# Patient Record
Sex: FEMALE | Race: WHITE | NOT HISPANIC OR LATINO | Employment: OTHER | URBAN - METROPOLITAN AREA
[De-identification: names, ages, dates, MRNs, and addresses within clinical notes are randomized per-mention and may not be internally consistent; named-entity substitution may affect disease eponyms.]

---

## 2021-07-26 ENCOUNTER — TELEPHONE (OUTPATIENT)
Dept: SCHEDULING | Facility: IMAGING CENTER | Age: 81
End: 2021-07-26

## 2021-08-23 ENCOUNTER — OFFICE VISIT (OUTPATIENT)
Dept: MEDICAL GROUP | Facility: MEDICAL CENTER | Age: 81
End: 2021-08-23
Payer: MEDICARE

## 2021-08-23 VITALS
HEIGHT: 67 IN | TEMPERATURE: 97.1 F | SYSTOLIC BLOOD PRESSURE: 112 MMHG | BODY MASS INDEX: 43.16 KG/M2 | OXYGEN SATURATION: 94 % | DIASTOLIC BLOOD PRESSURE: 64 MMHG | WEIGHT: 275 LBS | HEART RATE: 71 BPM | RESPIRATION RATE: 18 BRPM

## 2021-08-23 DIAGNOSIS — H35.30 MACULAR DEGENERATION OF BOTH EYES, UNSPECIFIED TYPE: ICD-10-CM

## 2021-08-23 DIAGNOSIS — Z12.83 SKIN CANCER SCREENING: ICD-10-CM

## 2021-08-23 DIAGNOSIS — C18.1 CANCER OF APPENDIX (HCC): ICD-10-CM

## 2021-08-23 DIAGNOSIS — Z79.891 CHRONIC USE OF OPIATE FOR THERAPEUTIC PURPOSE: ICD-10-CM

## 2021-08-23 DIAGNOSIS — F32.A MILD DEPRESSION: ICD-10-CM

## 2021-08-23 DIAGNOSIS — I10 BENIGN HYPERTENSION: ICD-10-CM

## 2021-08-23 DIAGNOSIS — M75.111 INCOMPLETE TEAR OF RIGHT ROTATOR CUFF, UNSPECIFIED WHETHER TRAUMATIC: ICD-10-CM

## 2021-08-23 DIAGNOSIS — M25.562 BILATERAL CHRONIC KNEE PAIN: ICD-10-CM

## 2021-08-23 DIAGNOSIS — E78.2 MIXED HYPERLIPIDEMIA: ICD-10-CM

## 2021-08-23 DIAGNOSIS — R26.2 UNABLE TO WALK: ICD-10-CM

## 2021-08-23 DIAGNOSIS — E66.01 MORBID OBESITY (HCC): ICD-10-CM

## 2021-08-23 DIAGNOSIS — I10 ESSENTIAL HYPERTENSION: ICD-10-CM

## 2021-08-23 DIAGNOSIS — C55: ICD-10-CM

## 2021-08-23 DIAGNOSIS — G89.29 BILATERAL CHRONIC KNEE PAIN: ICD-10-CM

## 2021-08-23 DIAGNOSIS — E61.1 IRON DEFICIENCY: ICD-10-CM

## 2021-08-23 DIAGNOSIS — M25.561 BILATERAL CHRONIC KNEE PAIN: ICD-10-CM

## 2021-08-23 PROBLEM — D50.9 MICROCYTIC ANEMIA: Status: ACTIVE | Noted: 2017-01-17

## 2021-08-23 PROBLEM — Z90.49 HISTORY OF PARTIAL SURGICAL REMOVAL OF COLON: Status: ACTIVE | Noted: 2019-03-17

## 2021-08-23 PROBLEM — E78.5 HYPERLIPIDEMIA: Status: ACTIVE | Noted: 2017-01-17

## 2021-08-23 PROBLEM — M25.519 SHOULDER PAIN: Status: ACTIVE | Noted: 2017-08-12

## 2021-08-23 PROBLEM — D3A.020 CARCINOID TUMOR OF APPENDIX: Status: ACTIVE | Noted: 2019-02-05

## 2021-08-23 PROBLEM — R53.1 ASTHENIA DUE TO DISEASE: Status: ACTIVE | Noted: 2019-01-22

## 2021-08-23 PROCEDURE — 99204 OFFICE O/P NEW MOD 45 MIN: CPT | Performed by: INTERNAL MEDICINE

## 2021-08-23 RX ORDER — HYDROCODONE BITARTRATE AND ACETAMINOPHEN 7.5; 325 MG/1; MG/1
TABLET ORAL
COMMUNITY
End: 2021-08-23 | Stop reason: SDUPTHER

## 2021-08-23 RX ORDER — FERROUS SULFATE 325(65) MG
325 TABLET ORAL 2 TIMES DAILY
Qty: 180 TABLET | Refills: 0 | Status: SHIPPED | OUTPATIENT
Start: 2021-08-23 | End: 2021-10-05 | Stop reason: SDUPTHER

## 2021-08-23 RX ORDER — CITALOPRAM 20 MG/1
20 TABLET ORAL DAILY
Qty: 90 TABLET | Refills: 3 | Status: SHIPPED | OUTPATIENT
Start: 2021-08-23 | End: 2022-08-25 | Stop reason: SDUPTHER

## 2021-08-23 RX ORDER — PRAVASTATIN SODIUM 20 MG
20 TABLET ORAL
Qty: 90 TABLET | Refills: 3 | Status: SHIPPED | OUTPATIENT
Start: 2021-08-23 | End: 2022-08-25 | Stop reason: SDUPTHER

## 2021-08-23 RX ORDER — PRAVASTATIN SODIUM 20 MG
20 TABLET ORAL NIGHTLY
COMMUNITY
End: 2021-08-23

## 2021-08-23 RX ORDER — HYDROCODONE BITARTRATE AND ACETAMINOPHEN 7.5; 325 MG/1; MG/1
1 TABLET ORAL EVERY 8 HOURS PRN
Qty: 60 TABLET | Refills: 0 | Status: SHIPPED | OUTPATIENT
Start: 2021-08-23 | End: 2021-09-28 | Stop reason: SDUPTHER

## 2021-08-23 RX ORDER — OMEGA-3S/DHA/EPA/FISH OIL/D3 300MG-1000
10 CAPSULE ORAL DAILY
COMMUNITY
End: 2023-01-01

## 2021-08-23 RX ORDER — LOSARTAN POTASSIUM 50 MG/1
75 TABLET ORAL
COMMUNITY
Start: 2021-07-16 | End: 2021-08-23

## 2021-08-23 RX ORDER — FERROUS SULFATE 325(65) MG
2 TABLET ORAL 2 TIMES DAILY
COMMUNITY
End: 2021-08-23 | Stop reason: SDUPTHER

## 2021-08-23 RX ORDER — POLYETHYLENE GLYCOL 3350 17 G/17G
17 POWDER, FOR SOLUTION ORAL DAILY
COMMUNITY
End: 2022-08-25

## 2021-08-23 RX ORDER — CITALOPRAM HYDROBROMIDE 10 MG/1
10 TABLET ORAL DAILY
COMMUNITY
End: 2021-08-23 | Stop reason: SDUPTHER

## 2021-08-23 RX ORDER — LOSARTAN POTASSIUM 50 MG/1
75 TABLET ORAL DAILY
Qty: 135 TABLET | Refills: 3 | Status: SHIPPED | OUTPATIENT
Start: 2021-08-23 | End: 2021-11-21

## 2021-08-23 ASSESSMENT — PATIENT HEALTH QUESTIONNAIRE - PHQ9: CLINICAL INTERPRETATION OF PHQ2 SCORE: 0

## 2021-08-23 NOTE — PROGRESS NOTES
New Patient    Olive Alexandra is a 81 y.o. 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 She is s/p 5040 cGy  IMRT with Dr Crews as of June 2019. used to see Ash Kuo MD    Morbid obesity (HCC)  Body mass index is 43.07 kg/m².    Healthful lifestyle measures        Carcinoma of uterus (HCC)  h/o Stage IIIB Endometrial CA s/p Xi RA hysterectomy, HARIS, RP mass excision, appendectomy, cytoreduction, enterolysis, omental resection on 1/18/2019 for grade II endometrial adenocarcinoma with final pathology revealing 100% invasion, +LVSI, no LN involvement. 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 She is s/p 5040 cGy  IMRT with Dr Crews as of June 2019. used to see Ash Kuo MD    Unable to walk  Unable to walk since 3/13/2019  On a scooter      Partial tear of right rotator cuff  Partial tear of right  "rotator cuff 3/13/19        Current Outpatient Medications   Medication Sig Dispense Refill   • HYDROcodone-acetaminophen (NORCO) 7.5-325 MG per tablet Take 1 Tablet by mouth every 8 hours as needed for Severe Pain for up to 30 days. 60 Tablet 0   • citalopram (CELEXA) 20 MG Tab Take 1 Tablet by mouth every day. 90 Tablet 3   • ferrous sulfate 325 (65 Fe) MG tablet Take 1 Tablet by mouth 2 times a day. 180 Tablet 0   • pravastatin (PRAVACHOL) 20 MG Tab Take 1 Tablet by mouth at bedtime. 90 Tablet 3   • losartan (COZAAR) 50 MG Tab Take 1.5 Tablets by mouth every day for 90 days. 135 Tablet 3   • ascorbic acid (VITAMIN C) 100 MG tablet      • vitamin D3, cholecalciferol, 400 UNIT Tab tablet Take 10 mcg by mouth every day.     • polyethylene glycol 3350 (MIRALAX) 17 GM/SCOOP Powder Take 17 g by mouth every day.     • B Complex-Folic Acid (B COMPLEX-VITAMIN B12 PO) Take 1 Tablet by mouth.       No current facility-administered medications for this visit.       Allergies, past medical history, past surgical history, medications, family history, social history reviewed and updated.    ROS   Constitutional: Denies fevers or chills  Eyes: Denies changes in vision  Ears/Nose/Throat/Mouth: Denies nasal congestion or sore throat   Cardiovascular: Denies chest pain or palpitations   Respiratory: Denies shortness of breath , Denies cough  Gastrointestinal/Hepatic: Denies abd pain, nausea, vomiting   Genitourinary: Denies dysuria or frequency  Musculoskeletal/Rheum: chronic knee pain, intermittent bilateral shoulder pain   Neurological: Denies headache  Psychiatric: mood stable  Endocrine: Denies hx of diabetes or thyroid dysfunction  Heme/Oncology/Lymph Nodes: Denies weight changes or enlarged LNs.    Physical Exam  Vitals: /64 (BP Location: Left arm, Patient Position: Sitting, BP Cuff Size: Adult)   Pulse 71   Temp 36.2 °C (97.1 °F) (Temporal)   Resp 18   Ht 1.702 m (5' 7\") Comment: verbal (unable to stand to take " height)  Wt 125 kg (275 lb) Comment: verbal (pt unable to stand to take weight ght)  SpO2 94%   BMI 43.07 kg/m²   General: Alert, pleasant, NAD  HEENT: Normocephalic.  EOMI, no icterus or pallor.  Conjunctivae and lids normal. External ears normal. Wearing a mask. Oropharynx non-erythematous, mucous membranes moist.  Neck supple.  No thyromegaly or masses palpated.   Lymph: No cervical or supraclavicular lymphadenopathy.  Cardiovascular: Regular rate and rhythm.   Respiratory: Normal respiratory effort.  Clear to auscultation bilaterally.  Abdomen: Non-distended, soft, non-tender  Skin: Warm, dry  Musculoskeletal: on a scooter, not able to stand.   Extremities: left lateral leg swelling.  Psych:  Affect/mood is normal, judgement is good, memory is intact, grooming is appropriate.    Assessment and Plan    1. Carcinoma of uterus (HCC)  2. Cancer of appendix (HCC)  h/o Stage IIIB Endometrial CA s/p Xi RA hysterectomy, HARIS, RP mass excision, appendectomy, cytoreduction, enterolysis, omental resection on 1/18/2019 for grade II endometrial adenocarcinoma with final pathology revealing 100% invasion, +LVSI, no LN involvement. 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 She is s/p 5040 cGy  IMRT with Dr Crews as of June 2019. used to see Ash Kuo MD       - REFERRAL TO GYNECOLOGIC ONCOLOGY      3. Macular degeneration of both eyes, unspecified type  - REFERRAL TO OPHTHALMOLOGY  On PreserVision    4. Bilateral chronic knee pain  5. Chronic use of opiate for therapeutic purpose  - REFERRAL TO PAIN MANAGEMENT  - DX-KNEE COMPLETE 4+ LEFT; Future  - HYDROcodone-acetaminophen (NORCO) 7.5-325 MG per tablet; Take 1 Tablet by mouth every 8 hours as needed for Severe Pain for up to 30 days.  Dispense: 60 Tablet; Refill: 0  Bowel regimen as needed (she has BM every morning, no  problem)  - Controlled Substance Treatment Agreement  Unable to provide urine today due to not being able to stand, she will bring her board from home, so she will try to collect urine in the clinic next visit for UDS.   A Controlled Substance Agreement has been signed within the last year.     The patient reports that pain is well controlled with the current treatment plan  They were counseled on other means to help with pain  This patient is continuing to use a controlled substance (CS) on a long term basis.  The patient is thoroughly aware of the goals of treatment with the CS  The patient is aware that yearly and random urine drug screens are required.  The patient has been instructed to take the CS only as prescribed.  The patient is prohibited from sharing the CS with any other person.  The patient is instructed to inform the provider if any other CS is taken, of any alcohol or cannabis or other recreational drug use, any treatment for side effects of the CS or complications, if they have CS active rx in other states  The patient has evidence for a reason for the CS  The treatment plan has been discussed with the patient  The  report has been reviewed    6. Skin cancer screening  - REFERRAL TO DERMATOLOGY    7. Essential hypertension  - losartan (COZAAR) 50 MG Tab; Take 1.5 Tablets by mouth every day for 90 days.  Dispense: 135 Tablet; Refill: 3  - CBC WITH DIFFERENTIAL; Future  - Comp Metabolic Panel; Future    8. Mixed hyperlipidemia  - pravastatin (PRAVACHOL) 20 MG Tab; Take 1 Tablet by mouth at bedtime.  Dispense: 90 Tablet; Refill: 3  - Lipid Profile; Future  - TSH WITH REFLEX TO FT4; Future    9. Iron deficiency  - ferrous sulfate 325 (65 Fe) MG tablet; Take 1 Tablet by mouth 2 times a day.  Dispense: 180 Tablet; Refill: 0  - CBC WITH DIFFERENTIAL; Future  - IRON/TOTAL IRON BIND; Future  - FERRITIN; Future  No overt bleeding  Had EGD colonoscopy in 2019    10. Morbid obesity (HCC)  Healthful lifestyle  measures    11. Mild depression (HCC)  - citalopram (CELEXA) 20 MG Tab; Take 1 Tablet by mouth every day.  Dispense: 90 Tablet; Refill: 3      Follow-up:Return in about 1 month (around 9/23/2021), or if symptoms worsen or fail to improve.    This note was created using voice recognition software. There may be unintended errors in spelling, grammar or content.

## 2021-08-23 NOTE — ASSESSMENT & PLAN NOTE
On ferrous sulfate 325 mg BID  Had EGD colonoscopy in 2019, will need to request records       Statement Selected

## 2021-08-23 NOTE — ASSESSMENT & PLAN NOTE
h/o Stage IIIB Endometrial CA s/p Xi RA hysterectomy, HARIS, RP mass excision, appendectomy, cytoreduction, enterolysis, omental resection on 1/18/2019 for grade II endometrial adenocarcinoma with final pathology revealing 100% invasion, +LVSI, no LN involvement. 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 She is s/p 5040 cGy  IMRT with Dr Crews as of June 2019. used to see Ash Kuo MD

## 2021-09-09 ENCOUNTER — HOSPITAL ENCOUNTER (OUTPATIENT)
Dept: LAB | Facility: MEDICAL CENTER | Age: 81
End: 2021-09-09
Attending: INTERNAL MEDICINE
Payer: MEDICARE

## 2021-09-09 DIAGNOSIS — E78.2 MIXED HYPERLIPIDEMIA: ICD-10-CM

## 2021-09-09 DIAGNOSIS — I10 BENIGN HYPERTENSION: ICD-10-CM

## 2021-09-09 DIAGNOSIS — E61.1 IRON DEFICIENCY: ICD-10-CM

## 2021-09-09 LAB
ALBUMIN SERPL BCP-MCNC: 3.9 G/DL (ref 3.2–4.9)
ALBUMIN/GLOB SERPL: 1.2 G/DL
ALP SERPL-CCNC: 162 U/L (ref 30–99)
ALT SERPL-CCNC: 24 U/L (ref 2–50)
ANION GAP SERPL CALC-SCNC: 11 MMOL/L (ref 7–16)
AST SERPL-CCNC: 21 U/L (ref 12–45)
BASOPHILS # BLD AUTO: 1 % (ref 0–1.8)
BASOPHILS # BLD: 0.06 K/UL (ref 0–0.12)
BILIRUB SERPL-MCNC: 0.5 MG/DL (ref 0.1–1.5)
BUN SERPL-MCNC: 19 MG/DL (ref 8–22)
CALCIUM SERPL-MCNC: 10 MG/DL (ref 8.5–10.5)
CHLORIDE SERPL-SCNC: 102 MMOL/L (ref 96–112)
CHOLEST SERPL-MCNC: 146 MG/DL (ref 100–199)
CO2 SERPL-SCNC: 27 MMOL/L (ref 20–33)
CREAT SERPL-MCNC: 0.48 MG/DL (ref 0.5–1.4)
EOSINOPHIL # BLD AUTO: 0.37 K/UL (ref 0–0.51)
EOSINOPHIL NFR BLD: 6.3 % (ref 0–6.9)
ERYTHROCYTE [DISTWIDTH] IN BLOOD BY AUTOMATED COUNT: 53.1 FL (ref 35.9–50)
FASTING STATUS PATIENT QL REPORTED: NORMAL
FERRITIN SERPL-MCNC: 106 NG/ML (ref 10–291)
GLOBULIN SER CALC-MCNC: 3.2 G/DL (ref 1.9–3.5)
GLUCOSE SERPL-MCNC: 104 MG/DL (ref 65–99)
HCT VFR BLD AUTO: 44.9 % (ref 37–47)
HDLC SERPL-MCNC: 51 MG/DL
HGB BLD-MCNC: 14.1 G/DL (ref 12–16)
IMM GRANULOCYTES # BLD AUTO: 0.03 K/UL (ref 0–0.11)
IMM GRANULOCYTES NFR BLD AUTO: 0.5 % (ref 0–0.9)
IRON SATN MFR SERPL: 22 % (ref 15–55)
IRON SERPL-MCNC: 61 UG/DL (ref 40–170)
LDLC SERPL CALC-MCNC: 77 MG/DL
LYMPHOCYTES # BLD AUTO: 0.74 K/UL (ref 1–4.8)
LYMPHOCYTES NFR BLD: 12.6 % (ref 22–41)
MCH RBC QN AUTO: 30.1 PG (ref 27–33)
MCHC RBC AUTO-ENTMCNC: 31.4 G/DL (ref 33.6–35)
MCV RBC AUTO: 95.9 FL (ref 81.4–97.8)
MONOCYTES # BLD AUTO: 0.57 K/UL (ref 0–0.85)
MONOCYTES NFR BLD AUTO: 9.7 % (ref 0–13.4)
NEUTROPHILS # BLD AUTO: 4.11 K/UL (ref 2–7.15)
NEUTROPHILS NFR BLD: 69.9 % (ref 44–72)
NRBC # BLD AUTO: 0 K/UL
NRBC BLD-RTO: 0 /100 WBC
PLATELET # BLD AUTO: 233 K/UL (ref 164–446)
PMV BLD AUTO: 11.1 FL (ref 9–12.9)
POTASSIUM SERPL-SCNC: 4.9 MMOL/L (ref 3.6–5.5)
PROT SERPL-MCNC: 7.1 G/DL (ref 6–8.2)
RBC # BLD AUTO: 4.68 M/UL (ref 4.2–5.4)
SODIUM SERPL-SCNC: 140 MMOL/L (ref 135–145)
TIBC SERPL-MCNC: 276 UG/DL (ref 250–450)
TRIGL SERPL-MCNC: 92 MG/DL (ref 0–149)
TSH SERPL DL<=0.005 MIU/L-ACNC: 0.8 UIU/ML (ref 0.38–5.33)
UIBC SERPL-MCNC: 215 UG/DL (ref 110–370)
WBC # BLD AUTO: 5.9 K/UL (ref 4.8–10.8)

## 2021-09-09 PROCEDURE — 80061 LIPID PANEL: CPT

## 2021-09-09 PROCEDURE — 36415 COLL VENOUS BLD VENIPUNCTURE: CPT

## 2021-09-09 PROCEDURE — 83540 ASSAY OF IRON: CPT

## 2021-09-09 PROCEDURE — 85025 COMPLETE CBC W/AUTO DIFF WBC: CPT

## 2021-09-09 PROCEDURE — 82728 ASSAY OF FERRITIN: CPT

## 2021-09-09 PROCEDURE — 84443 ASSAY THYROID STIM HORMONE: CPT

## 2021-09-09 PROCEDURE — 83550 IRON BINDING TEST: CPT

## 2021-09-09 PROCEDURE — 80053 COMPREHEN METABOLIC PANEL: CPT

## 2021-09-28 ENCOUNTER — TELEPHONE (OUTPATIENT)
Dept: MEDICAL GROUP | Facility: MEDICAL CENTER | Age: 81
End: 2021-09-28

## 2021-09-28 DIAGNOSIS — G89.29 BILATERAL CHRONIC KNEE PAIN: ICD-10-CM

## 2021-09-28 DIAGNOSIS — M25.561 BILATERAL CHRONIC KNEE PAIN: ICD-10-CM

## 2021-09-28 DIAGNOSIS — M25.562 BILATERAL CHRONIC KNEE PAIN: ICD-10-CM

## 2021-09-28 DIAGNOSIS — Z79.891 CHRONIC USE OF OPIATE FOR THERAPEUTIC PURPOSE: ICD-10-CM

## 2021-09-28 RX ORDER — HYDROCODONE BITARTRATE AND ACETAMINOPHEN 7.5; 325 MG/1; MG/1
1 TABLET ORAL EVERY 8 HOURS PRN
Qty: 20 TABLET | Refills: 0 | Status: SHIPPED | OUTPATIENT
Start: 2021-09-28 | End: 2021-10-05 | Stop reason: SDUPTHER

## 2021-09-28 NOTE — TELEPHONE ENCOUNTER
1. Name: Olive Alexandra      Call Back Number: phone      How would the patient prefer to be contacted with a response: phone    Called pt Olive in regards to missed appt she said her  called and cancelled this morning. Advised diodnt come thru. She said she had an accident this morning and she couldn't make it she also asked for a refill on vikoden and needed the MA for Dr Carbajal to call her before the EOD because she already left a lot of messages this morning.     Thank you

## 2021-09-28 NOTE — PROGRESS NOTES
She was not able to attend her appointment today due to ground level fall.  She rescheduled to next Tuesday. Will need to get her urine drug screen next week      Chronic use of opiate for therapeutic purpose  -     HYDROcodone-acetaminophen (NORCO) 7.5-325 MG per tablet; Take 1 Tablet by mouth every 8 hours as needed for Severe Pain for up to 10 days.    Bilateral chronic knee pain  -     HYDROcodone-acetaminophen (NORCO) 7.5-325 MG per tablet; Take 1 Tablet by mouth every 8 hours as needed for Severe Pain for up to 10 days.

## 2021-10-05 ENCOUNTER — OFFICE VISIT (OUTPATIENT)
Dept: MEDICAL GROUP | Facility: MEDICAL CENTER | Age: 81
End: 2021-10-05
Payer: MEDICARE

## 2021-10-05 ENCOUNTER — HOSPITAL ENCOUNTER (OUTPATIENT)
Facility: MEDICAL CENTER | Age: 81
End: 2021-10-05
Attending: INTERNAL MEDICINE
Payer: MEDICARE

## 2021-10-05 VITALS
HEART RATE: 74 BPM | OXYGEN SATURATION: 94 % | SYSTOLIC BLOOD PRESSURE: 145 MMHG | WEIGHT: 275 LBS | HEIGHT: 67 IN | TEMPERATURE: 97.3 F | RESPIRATION RATE: 14 BRPM | BODY MASS INDEX: 43.16 KG/M2 | DIASTOLIC BLOOD PRESSURE: 68 MMHG

## 2021-10-05 DIAGNOSIS — Z79.891 CHRONIC USE OF OPIATE FOR THERAPEUTIC PURPOSE: ICD-10-CM

## 2021-10-05 DIAGNOSIS — M25.551 BILATERAL HIP PAIN: ICD-10-CM

## 2021-10-05 DIAGNOSIS — R74.8 ELEVATED ALKALINE PHOSPHATASE LEVEL: ICD-10-CM

## 2021-10-05 DIAGNOSIS — R73.03 PREDIABETES: ICD-10-CM

## 2021-10-05 DIAGNOSIS — E66.01 MORBID OBESITY (HCC): ICD-10-CM

## 2021-10-05 DIAGNOSIS — M25.562 BILATERAL CHRONIC KNEE PAIN: ICD-10-CM

## 2021-10-05 DIAGNOSIS — M25.552 BILATERAL HIP PAIN: ICD-10-CM

## 2021-10-05 DIAGNOSIS — E61.1 IRON DEFICIENCY: ICD-10-CM

## 2021-10-05 DIAGNOSIS — I10 ESSENTIAL HYPERTENSION: ICD-10-CM

## 2021-10-05 DIAGNOSIS — M25.561 BILATERAL CHRONIC KNEE PAIN: ICD-10-CM

## 2021-10-05 DIAGNOSIS — E55.9 VITAMIN D INSUFFICIENCY: ICD-10-CM

## 2021-10-05 DIAGNOSIS — G89.29 BILATERAL CHRONIC KNEE PAIN: ICD-10-CM

## 2021-10-05 PROCEDURE — G0480 DRUG TEST DEF 1-7 CLASSES: HCPCS

## 2021-10-05 PROCEDURE — 99214 OFFICE O/P EST MOD 30 MIN: CPT | Performed by: INTERNAL MEDICINE

## 2021-10-05 PROCEDURE — 80307 DRUG TEST PRSMV CHEM ANLYZR: CPT

## 2021-10-05 RX ORDER — HYDROCODONE BITARTRATE AND ACETAMINOPHEN 7.5; 325 MG/1; MG/1
1 TABLET ORAL EVERY 8 HOURS PRN
Qty: 60 TABLET | Refills: 0 | Status: SHIPPED | OUTPATIENT
Start: 2021-12-08 | End: 2021-12-21 | Stop reason: SDUPTHER

## 2021-10-05 RX ORDER — HYDROCODONE BITARTRATE AND ACETAMINOPHEN 7.5; 325 MG/1; MG/1
1 TABLET ORAL EVERY 8 HOURS PRN
Qty: 60 TABLET | Refills: 0 | Status: SHIPPED | OUTPATIENT
Start: 2021-11-08 | End: 2021-12-08

## 2021-10-05 RX ORDER — HYDROCODONE BITARTRATE AND ACETAMINOPHEN 7.5; 325 MG/1; MG/1
1 TABLET ORAL EVERY 8 HOURS PRN
Qty: 60 TABLET | Refills: 0 | Status: SHIPPED | OUTPATIENT
Start: 2021-10-09 | End: 2021-11-08

## 2021-10-05 RX ORDER — FERROUS SULFATE 325(65) MG
325 TABLET ORAL 2 TIMES DAILY
Qty: 180 TABLET | Refills: 2 | Status: SHIPPED | OUTPATIENT
Start: 2021-10-05 | End: 2022-09-29 | Stop reason: SDUPTHER

## 2021-10-05 ASSESSMENT — FIBROSIS 4 INDEX: FIB4 SCORE: 1.47

## 2021-10-05 ASSESSMENT — PATIENT HEALTH QUESTIONNAIRE - PHQ9
1. LITTLE INTEREST OR PLEASURE IN DOING THINGS: NOT AT ALL
8. MOVING OR SPEAKING SO SLOWLY THAT OTHER PEOPLE COULD HAVE NOTICED. OR THE OPPOSITE, BEING SO FIGETY OR RESTLESS THAT YOU HAVE BEEN MOVING AROUND A LOT MORE THAN USUAL: NOT AT ALL
SUM OF ALL RESPONSES TO PHQ QUESTIONS 1-9: 0
5. POOR APPETITE OR OVEREATING: NOT AT ALL
6. FEELING BAD ABOUT YOURSELF - OR THAT YOU ARE A FAILURE OR HAVE LET YOURSELF OR YOUR FAMILY DOWN: NOT AL ALL
SUM OF ALL RESPONSES TO PHQ9 QUESTIONS 1 AND 2: 0
9. THOUGHTS THAT YOU WOULD BE BETTER OFF DEAD, OR OF HURTING YOURSELF: NOT AT ALL
3. TROUBLE FALLING OR STAYING ASLEEP OR SLEEPING TOO MUCH: NOT AT ALL
4. FEELING TIRED OR HAVING LITTLE ENERGY: NOT AT ALL
2. FEELING DOWN, DEPRESSED, IRRITABLE, OR HOPELESS: NOT AT ALL
7. TROUBLE CONCENTRATING ON THINGS, SUCH AS READING THE NEWSPAPER OR WATCHING TELEVISION: NOT AT ALL

## 2021-10-05 NOTE — ASSESSMENT & PLAN NOTE
On ferrous sulfate 325 mg BID  Iron panel is stable    Had EGD-colonoscopy in 1/2019    Specimen: A: GASTRIC POLYP   B: CECAL POLYP   C: POLYP DESCENDING COLON     DIAGNOSIS:   A.     STOMACH (POLYP), BIOPSY:        -     FUNDIC GLAND POLYP.        -     NEGATIVE FOR INTESTINAL METAPLASIA, DYSPLASIA AND MALIGNANCY.     B.     COLON, CECUM (POLYP), BIOPSY:        -     TUBULAR ADENOMA FRAGMENTS.        -     NEGATIVE FOR HIGH GRADE DYSPLASIA OR MALIGNANCY.     C.     COLON, DESCENDING (POLYP), BIOPSY:        -     SCHWANN CELL HAMARTOMA.        -     ASSOCIATED HYPERPLASTIC CHANGE.        -     NEGATIVE FOR DYSPLASIA OR MALIGNANCY.

## 2021-10-05 NOTE — ASSESSMENT & PLAN NOTE
She is on [place] 7.5-325 every 8 hours as needed for her chronic knee and hip pain.  She tried physical therapy before.   She declines going to pain management.  Urine drug screen test was collected on October 5, 2021

## 2021-10-05 NOTE — PROGRESS NOTES
Established Patient    Olive Alexandra is a 81 y.o. female who presents today with the following:    CC:   Chief Complaint   Patient presents with   • Follow-Up   • Medication Refill       HPI:     Iron deficiency  On ferrous sulfate 325 mg BID  Iron panel is stable    Had EGD-colonoscopy in 1/2019    Specimen: A: GASTRIC POLYP   B: CECAL POLYP   C: POLYP DESCENDING COLON     DIAGNOSIS:   A.     STOMACH (POLYP), BIOPSY:        -     FUNDIC GLAND POLYP.        -     NEGATIVE FOR INTESTINAL METAPLASIA, DYSPLASIA AND MALIGNANCY.     B.     COLON, CECUM (POLYP), BIOPSY:        -     TUBULAR ADENOMA FRAGMENTS.        -     NEGATIVE FOR HIGH GRADE DYSPLASIA OR MALIGNANCY.     C.     COLON, DESCENDING (POLYP), BIOPSY:        -     SCHWANN CELL HAMARTOMA.        -     ASSOCIATED HYPERPLASTIC CHANGE.        -     NEGATIVE FOR DYSPLASIA OR MALIGNANCY.          Essential hypertension  Stable on losartan      Chronic use of opiate for therapeutic purpose  She is on Norco 7.5-325 every 8 hours as needed for her chronic knee and hip pain.  She tried physical therapy before.   She declines going to pain management.  Urine drug screen test was collected on October 5, 2021        Morbid obesity (HCC)  Body mass index is 43.07 kg/m².    Healthful lifestyle measures        Bilateral chronic knee pain  Chronic knee pain on Norco 7.5-325 Q6-8H prn  Now she cut down to 60 tablets per month      Elevated alkaline phosphatase level  Healthful lifestyle measures     Ref. Range 9/9/2021 09:07   Alkaline Phosphatase Latest Ref Range: 30 - 99 U/L 162 (H)         Bilateral hip pain  Bilateral chronic hip pain        Current Outpatient Medications   Medication Sig Dispense Refill   • [START ON 10/9/2021] HYDROcodone-acetaminophen (NORCO) 7.5-325 MG per tablet Take 1 Tablet by mouth every 8 hours as needed for Severe Pain for up to 30 days. 10/9/21-11/8/21 60 Tablet 0   • [START ON 11/8/2021] HYDROcodone-acetaminophen (NORCO) 7.5-325 MG per tablet  Take 1 Tablet by mouth every 8 hours as needed for Severe Pain for up to 30 days. 11/8/21-12/8/21 60 Tablet 0   • [START ON 12/8/2021] HYDROcodone-acetaminophen (NORCO) 7.5-325 MG per tablet Take 1 Tablet by mouth every 8 hours as needed for Severe Pain for up to 30 days. 12/8/2021-1/7/2022 60 Tablet 0   • ferrous sulfate 325 (65 Fe) MG tablet Take 1 Tablet by mouth 2 times a day. 180 Tablet 2   • ascorbic acid (VITAMIN C) 100 MG tablet      • vitamin D3, cholecalciferol, 400 UNIT Tab tablet Take 10 mcg by mouth every day.     • B Complex-Folic Acid (B COMPLEX-VITAMIN B12 PO) Take 1 Tablet by mouth.     • citalopram (CELEXA) 20 MG Tab Take 1 Tablet by mouth every day. 90 Tablet 3   • pravastatin (PRAVACHOL) 20 MG Tab Take 1 Tablet by mouth at bedtime. 90 Tablet 3   • losartan (COZAAR) 50 MG Tab Take 1.5 Tablets by mouth every day for 90 days. 135 Tablet 3   • polyethylene glycol 3350 (MIRALAX) 17 GM/SCOOP Powder Take 17 g by mouth every day. (Patient not taking: Reported on 10/5/2021)       No current facility-administered medications for this visit.       Allergies, past medical history, past surgical history, medications, family history, social history reviewed and updated.    ROS   Constitutional: Denies fevers or chills  Eyes: Denies changes in vision  Ears/Nose/Throat/Mouth: Denies nasal congestion or sore throat   Cardiovascular: Denies chest pain or palpitations   Respiratory: Denies shortness of breath , Denies cough  Gastrointestinal/Hepatic: Denies abd pain, nausea, vomiting   Genitourinary: Denies dysuria or frequency  Musculoskeletal/Rheum: Chronic knee pain, chronic hip pain  Neurological: Denies headache  Psychiatric: Stable  Endocrine:  Diabetes Denies hx of thyroid dysfunction  Heme/Oncology/Lymph Nodes: Denies weight changes or enlarged LNs.    Physical Exam  Vitals: /68 (BP Location: Left arm, Patient Position: Sitting, BP Cuff Size: Adult)   Pulse 74   Temp 36.3 °C (97.3 °F) (Temporal)    "Resp 14   Ht 1.702 m (5' 7\")   Wt 125 kg (275 lb)   SpO2 94%   BMI 43.07 kg/m²   General: Alert, pleasant, NAD  HEENT: Normocephalic.  EOMI, no icterus or pallor.  Conjunctivae and lids normal. External ears normal. Wearing a mask. Oropharynx non-erythematous, mucous membranes moist.  Neck supple.  No thyromegaly or masses palpated.   Lymph: No cervical or supraclavicular lymphadenopathy.  Cardiovascular: Regular rate and rhythm.    Respiratory: Normal respiratory effort.  Clear to auscultation bilaterally.  Abdomen: Non-distended, soft, non-tender  Skin: Warm, dry  Musculoskeletal: Gait is normal.  Moves all extremities well.  Extremities: No leg edema.  Radial pulses 2+ symmetric.   Psych:  Affect/mood is normal, judgement is good, memory is intact, grooming is appropriate.      Assessment and Plan    Bilateral chronic knee pain  Bilateral chronic hip pain  Chronic use of opiate for therapeutic purpose  Her pain is controlled with current regimen  Recommend physiatry or pain management referral, patient declines.    - HYDROcodone-acetaminophen (NORCO) 7.5-325 MG per tablet; Take 1 Tablet by mouth every 8 hours as needed for Severe Pain for up to 30 days. 10/9/21-11/8/21  Dispense: 60 Tablet; Refill: 0  - HYDROcodone-acetaminophen (NORCO) 7.5-325 MG per tablet; Take 1 Tablet by mouth every 8 hours as needed for Severe Pain for up to 30 days. 11/8/21-12/8/21  Dispense: 60 Tablet; Refill: 0  - HYDROcodone-acetaminophen (NORCO) 7.5-325 MG per tablet; Take 1 Tablet by mouth every 8 hours as needed for Severe Pain for up to 30 days. 12/8/2021-1/7/2022  Dispense: 60 Tablet; Refill: 0    A Controlled Substance Agreement has been signed within the last visit. A urine drug screen has been done .        The patient reports that chronic pain is well controlled with the current treatment plan  They were counseled on other means to help with chronic pain   This patient is continuing to use a controlled substance (CS) on a " long term basis.  The patient is thoroughly aware of the goals of treatment with the CS  The patient is aware that yearly and random urine drug screens are required.  The patient has been instructed to take the CS only as prescribed.  The patient is prohibited from sharing the CS with any other person.  The patient is instructed to inform the provider if any other CS is taken, of any alcohol or cannabis or other recreational drug use, any treatment for side effects of the CS or complications, if they have CS active rx in other states  The patient has evidence for a reason for the CS  The treatment plan has been discussed with the patient  The  report has been reviewed       Iron deficiency  - ferrous sulfate 325 (65 Fe) MG tablet; Take 1 Tablet by mouth 2 times a day.  Dispense: 180 Tablet; Refill: 2     Prediabetes  - HEMOGLOBIN A1C; Future     Elevated alkaline phosphatase level  - Comp Metabolic Panel; Future  - ALKALINE PHOSPHATASE ISOENZYMES; Future  - GAMMA GT (GGT); Future  - VITAMIN D,25 HYDROXY; Future     Vitamin D insufficiency  - VITAMIN D,25 HYDROXY; Future     Essential hypertension  Stable on losartan     Morbid obesity (HCC)  Healthful lifestyle measures        Follow-up:Return in about 3 months (around 1/5/2022), or if symptoms worsen or fail to improve.    This note was created using voice recognition software. There may be unintended errors in spelling, grammar or content.

## 2021-10-05 NOTE — ASSESSMENT & PLAN NOTE
Healthful lifestyle measures     Ref. Range 9/9/2021 09:07   Alkaline Phosphatase Latest Ref Range: 30 - 99 U/L 162 (H)

## 2021-10-07 LAB
AMPHET CTO UR CFM-MCNC: NEGATIVE NG/ML
BARBITURATES CTO UR CFM-MCNC: NEGATIVE NG/ML
BENZODIAZ CTO UR CFM-MCNC: NEGATIVE NG/ML
CANNABINOIDS CTO UR CFM-MCNC: NEGATIVE NG/ML
COCAINE CTO UR CFM-MCNC: NEGATIVE NG/ML
DRUG COMMENT 753798: NORMAL
METHADONE CTO UR CFM-MCNC: NEGATIVE NG/ML
OPIATES CTO UR CFM-MCNC: POSITIVE NG/ML
PCP CTO UR CFM-MCNC: NEGATIVE NG/ML
PROPOXYPH CTO UR CFM-MCNC: NEGATIVE NG/ML

## 2021-10-09 LAB
6MAM UR CFM-MCNC: <10 NG/ML
CODEINE UR CFM-MCNC: <20 NG/ML
HYDROCODONE UR CFM-MCNC: 1540 NG/ML
HYDROMORPHONE UR CFM-MCNC: 22 NG/ML
MORPHINE UR CFM-MCNC: <20 NG/ML
NORHYDROCODONE UR CFM-MCNC: >4000 NG/ML
NOROXYCODONE UR CFM-MCNC: <20 NG/ML
OPIATES UR NOROXYM Q0836: <20 NG/ML
OXYCODONE UR CFM-MCNC: <20 NG/ML
OXYMORPHONE UR CFM-MCNC: <20 NG/ML

## 2021-10-21 ENCOUNTER — APPOINTMENT (RX ONLY)
Dept: URBAN - METROPOLITAN AREA CLINIC 36 | Facility: CLINIC | Age: 81
Setting detail: DERMATOLOGY
End: 2021-10-21

## 2021-10-21 DIAGNOSIS — Z71.89 OTHER SPECIFIED COUNSELING: ICD-10-CM

## 2021-10-21 DIAGNOSIS — L73.8 OTHER SPECIFIED FOLLICULAR DISORDERS: ICD-10-CM

## 2021-10-21 DIAGNOSIS — L81.4 OTHER MELANIN HYPERPIGMENTATION: ICD-10-CM

## 2021-10-21 DIAGNOSIS — L82.1 OTHER SEBORRHEIC KERATOSIS: ICD-10-CM

## 2021-10-21 DIAGNOSIS — L57.8 OTHER SKIN CHANGES DUE TO CHRONIC EXPOSURE TO NONIONIZING RADIATION: ICD-10-CM

## 2021-10-21 DIAGNOSIS — L82.0 INFLAMED SEBORRHEIC KERATOSIS: ICD-10-CM

## 2021-10-21 DIAGNOSIS — D22 MELANOCYTIC NEVI: ICD-10-CM

## 2021-10-21 DIAGNOSIS — D18.0 HEMANGIOMA: ICD-10-CM

## 2021-10-21 PROBLEM — D22.5 MELANOCYTIC NEVI OF TRUNK: Status: ACTIVE | Noted: 2021-10-21

## 2021-10-21 PROBLEM — D18.01 HEMANGIOMA OF SKIN AND SUBCUTANEOUS TISSUE: Status: ACTIVE | Noted: 2021-10-21

## 2021-10-21 PROBLEM — D48.5 NEOPLASM OF UNCERTAIN BEHAVIOR OF SKIN: Status: ACTIVE | Noted: 2021-10-21

## 2021-10-21 PROCEDURE — ? BIOPSY BY SHAVE METHOD

## 2021-10-21 PROCEDURE — 11103 TANGNTL BX SKIN EA SEP/ADDL: CPT

## 2021-10-21 PROCEDURE — ? ADDITIONAL NOTES

## 2021-10-21 PROCEDURE — ? PRESCRIPTION

## 2021-10-21 PROCEDURE — ? COUNSELING

## 2021-10-21 PROCEDURE — 99203 OFFICE O/P NEW LOW 30 MIN: CPT | Mod: 25

## 2021-10-21 PROCEDURE — 11102 TANGNTL BX SKIN SINGLE LES: CPT

## 2021-10-21 RX ORDER — DOXYCYCLINE HYCLATE 100 MG/1
1 CAPSULE, GELATIN COATED ORAL BID
Qty: 28 | Refills: 0 | Status: ERX | COMMUNITY
Start: 2021-10-21

## 2021-10-21 RX ADMIN — DOXYCYCLINE HYCLATE 1: 100 CAPSULE, GELATIN COATED ORAL at 00:00

## 2021-10-21 ASSESSMENT — LOCATION ZONE DERM
LOCATION ZONE: TRUNK
LOCATION ZONE: ARM
LOCATION ZONE: FACE
LOCATION ZONE: SCALP

## 2021-10-21 ASSESSMENT — LOCATION DETAILED DESCRIPTION DERM
LOCATION DETAILED: LEFT INFERIOR MEDIAL MIDBACK
LOCATION DETAILED: RIGHT SUPERIOR UPPER BACK
LOCATION DETAILED: LEFT SUPERIOR PARIETAL SCALP
LOCATION DETAILED: RIGHT INFERIOR MEDIAL MIDBACK
LOCATION DETAILED: RIGHT VENTRAL PROXIMAL FOREARM
LOCATION DETAILED: RIGHT PROXIMAL DORSAL FOREARM
LOCATION DETAILED: EPIGASTRIC SKIN
LOCATION DETAILED: LEFT ANTERIOR PROXIMAL UPPER ARM
LOCATION DETAILED: LEFT VENTRAL PROXIMAL FOREARM
LOCATION DETAILED: RIGHT SUPERIOR MEDIAL UPPER BACK
LOCATION DETAILED: SUPERIOR LUMBAR SPINE
LOCATION DETAILED: RIGHT INFERIOR CENTRAL MALAR CHEEK
LOCATION DETAILED: LEFT PROXIMAL DORSAL FOREARM
LOCATION DETAILED: RIGHT MID-UPPER BACK
LOCATION DETAILED: RIGHT ANTERIOR PROXIMAL UPPER ARM
LOCATION DETAILED: LEFT INFERIOR POSTAURICULAR SKIN
LOCATION DETAILED: LEFT MID-UPPER BACK
LOCATION DETAILED: RIGHT VENTRAL LATERAL DISTAL FOREARM

## 2021-10-21 ASSESSMENT — LOCATION SIMPLE DESCRIPTION DERM
LOCATION SIMPLE: LOWER BACK
LOCATION SIMPLE: RIGHT CHEEK
LOCATION SIMPLE: RIGHT UPPER ARM
LOCATION SIMPLE: LEFT UPPER ARM
LOCATION SIMPLE: RIGHT UPPER BACK
LOCATION SIMPLE: LEFT UPPER BACK
LOCATION SIMPLE: ABDOMEN
LOCATION SIMPLE: LEFT FOREARM
LOCATION SIMPLE: RIGHT FOREARM
LOCATION SIMPLE: SCALP
LOCATION SIMPLE: RIGHT LOWER BACK
LOCATION SIMPLE: LEFT LOWER BACK

## 2021-10-21 NOTE — PROCEDURE: ADDITIONAL NOTES
Detail Level: Simple
Render Risk Assessment In Note?: no
Additional Notes: Advised patient to soak foot in diluted white vinegar to help prevent infection.

## 2021-12-10 ENCOUNTER — HOSPITAL ENCOUNTER (OUTPATIENT)
Dept: LAB | Facility: MEDICAL CENTER | Age: 81
End: 2021-12-10
Attending: INTERNAL MEDICINE
Payer: MEDICARE

## 2021-12-10 DIAGNOSIS — R73.03 PREDIABETES: ICD-10-CM

## 2021-12-10 DIAGNOSIS — E55.9 VITAMIN D INSUFFICIENCY: ICD-10-CM

## 2021-12-10 DIAGNOSIS — R74.8 ELEVATED ALKALINE PHOSPHATASE LEVEL: ICD-10-CM

## 2021-12-10 LAB
ALBUMIN SERPL BCP-MCNC: 4.1 G/DL (ref 3.2–4.9)
ALBUMIN/GLOB SERPL: 1.6 G/DL
ALP SERPL-CCNC: 226 U/L (ref 30–99)
ALT SERPL-CCNC: 36 U/L (ref 2–50)
ANION GAP SERPL CALC-SCNC: 8 MMOL/L (ref 7–16)
AST SERPL-CCNC: 26 U/L (ref 12–45)
BILIRUB SERPL-MCNC: 0.7 MG/DL (ref 0.1–1.5)
BUN SERPL-MCNC: 16 MG/DL (ref 8–22)
CALCIUM SERPL-MCNC: 10.1 MG/DL (ref 8.5–10.5)
CHLORIDE SERPL-SCNC: 102 MMOL/L (ref 96–112)
CO2 SERPL-SCNC: 30 MMOL/L (ref 20–33)
CREAT SERPL-MCNC: 0.39 MG/DL (ref 0.5–1.4)
EST. AVERAGE GLUCOSE BLD GHB EST-MCNC: 111 MG/DL
FASTING STATUS PATIENT QL REPORTED: NORMAL
GGT SERPL-CCNC: 180 U/L (ref 7–34)
GLOBULIN SER CALC-MCNC: 2.6 G/DL (ref 1.9–3.5)
GLUCOSE SERPL-MCNC: 110 MG/DL (ref 65–99)
HBA1C MFR BLD: 5.5 % (ref 4–5.6)
POTASSIUM SERPL-SCNC: 4.8 MMOL/L (ref 3.6–5.5)
PROT SERPL-MCNC: 6.7 G/DL (ref 6–8.2)
SODIUM SERPL-SCNC: 140 MMOL/L (ref 135–145)

## 2021-12-10 PROCEDURE — 82977 ASSAY OF GGT: CPT

## 2021-12-10 PROCEDURE — 84075 ASSAY ALKALINE PHOSPHATASE: CPT | Mod: XU

## 2021-12-10 PROCEDURE — 80053 COMPREHEN METABOLIC PANEL: CPT

## 2021-12-10 PROCEDURE — 36415 COLL VENOUS BLD VENIPUNCTURE: CPT

## 2021-12-10 PROCEDURE — 84080 ASSAY ALKALINE PHOSPHATASES: CPT

## 2021-12-10 PROCEDURE — 82306 VITAMIN D 25 HYDROXY: CPT

## 2021-12-10 PROCEDURE — 83036 HEMOGLOBIN GLYCOSYLATED A1C: CPT | Mod: GA

## 2021-12-12 LAB
25(OH)D3 SERPL-MCNC: 37 NG/ML (ref 30–80)
ALP BONE SERPL-CCNC: 60 U/L (ref 0–55)
ALP ISOS SERPL HS-CCNC: 0 U/L
ALP LIVER SERPL-CCNC: 172 U/L (ref 0–94)
ALP SERPL-CCNC: 232 U/L (ref 40–120)

## 2021-12-13 DIAGNOSIS — R74.8 ELEVATED SERUM GGT LEVEL: ICD-10-CM

## 2021-12-13 DIAGNOSIS — R74.8 ELEVATED ALKALINE PHOSPHATASE LEVEL: ICD-10-CM

## 2021-12-13 NOTE — PROGRESS NOTES
Elevated alkaline phosphatase level  -     US-ABDOMEN COMPLETE SURVEY; Future    Elevated serum GGT level  -     US-ABDOMEN COMPLETE SURVEY; Future

## 2021-12-16 ENCOUNTER — HOSPITAL ENCOUNTER (OUTPATIENT)
Dept: RADIOLOGY | Facility: MEDICAL CENTER | Age: 81
End: 2021-12-16
Attending: INTERNAL MEDICINE
Payer: MEDICARE

## 2021-12-16 DIAGNOSIS — R74.8 ELEVATED SERUM GGT LEVEL: ICD-10-CM

## 2021-12-16 DIAGNOSIS — R74.8 ELEVATED ALKALINE PHOSPHATASE LEVEL: ICD-10-CM

## 2021-12-16 PROCEDURE — 76705 ECHO EXAM OF ABDOMEN: CPT

## 2021-12-21 ENCOUNTER — TELEMEDICINE (OUTPATIENT)
Dept: MEDICAL GROUP | Facility: MEDICAL CENTER | Age: 81
End: 2021-12-21
Payer: MEDICARE

## 2021-12-21 VITALS
WEIGHT: 275 LBS | SYSTOLIC BLOOD PRESSURE: 135 MMHG | BODY MASS INDEX: 44.2 KG/M2 | HEIGHT: 66 IN | DIASTOLIC BLOOD PRESSURE: 70 MMHG

## 2021-12-21 DIAGNOSIS — M25.552 BILATERAL HIP PAIN: ICD-10-CM

## 2021-12-21 DIAGNOSIS — M25.562 BILATERAL CHRONIC KNEE PAIN: ICD-10-CM

## 2021-12-21 DIAGNOSIS — M25.561 BILATERAL CHRONIC KNEE PAIN: ICD-10-CM

## 2021-12-21 DIAGNOSIS — R74.8 ELEVATED ALKALINE PHOSPHATASE LEVEL: ICD-10-CM

## 2021-12-21 DIAGNOSIS — M25.551 BILATERAL HIP PAIN: ICD-10-CM

## 2021-12-21 DIAGNOSIS — C55: ICD-10-CM

## 2021-12-21 DIAGNOSIS — R74.8 ELEVATED SERUM GGT LEVEL: ICD-10-CM

## 2021-12-21 DIAGNOSIS — E61.1 IRON DEFICIENCY: ICD-10-CM

## 2021-12-21 DIAGNOSIS — G89.29 BILATERAL CHRONIC KNEE PAIN: ICD-10-CM

## 2021-12-21 DIAGNOSIS — Z79.891 CHRONIC USE OF OPIATE FOR THERAPEUTIC PURPOSE: ICD-10-CM

## 2021-12-21 PROCEDURE — 99214 OFFICE O/P EST MOD 30 MIN: CPT | Mod: 95 | Performed by: INTERNAL MEDICINE

## 2021-12-21 RX ORDER — HYDROCODONE BITARTRATE AND ACETAMINOPHEN 7.5; 325 MG/1; MG/1
1 TABLET ORAL EVERY 8 HOURS PRN
Qty: 60 TABLET | Refills: 0 | Status: SHIPPED | OUTPATIENT
Start: 2022-03-08 | End: 2022-03-29 | Stop reason: SDUPTHER

## 2021-12-21 RX ORDER — DOXYCYCLINE HYCLATE 100 MG/1
CAPSULE ORAL
COMMUNITY
Start: 2021-10-21 | End: 2022-03-29

## 2021-12-21 RX ORDER — HYDROCODONE BITARTRATE AND ACETAMINOPHEN 7.5; 325 MG/1; MG/1
1 TABLET ORAL EVERY 8 HOURS PRN
Qty: 60 TABLET | Refills: 0 | Status: SHIPPED | OUTPATIENT
Start: 2022-01-07 | End: 2022-02-06

## 2021-12-21 RX ORDER — HYDROCODONE BITARTRATE AND ACETAMINOPHEN 7.5; 325 MG/1; MG/1
1 TABLET ORAL EVERY 8 HOURS PRN
Qty: 60 TABLET | Refills: 0 | Status: SHIPPED | OUTPATIENT
Start: 2022-02-06 | End: 2022-03-08

## 2021-12-21 RX ORDER — LOSARTAN POTASSIUM 50 MG/1
TABLET ORAL
COMMUNITY
Start: 2021-11-22 | End: 2022-03-29

## 2021-12-21 ASSESSMENT — FIBROSIS 4 INDEX: FIB4 SCORE: 1.49

## 2021-12-21 NOTE — ASSESSMENT & PLAN NOTE
On ferrous sulfate 325 mg BID  Iron panel is stable     Ref. Range 9/9/2021 09:07   Ferritin Latest Ref Range: 10.0 - 291.0 ng/mL 106.0      Ref. Range 9/9/2021 09:07   Iron Latest Ref Range: 40 - 170 ug/dL 61   Total Iron Binding Latest Ref Range: 250 - 450 ug/dL 276   % Saturation Latest Ref Range: 15 - 55 % 22   Unsat Iron Binding Latest Ref Range: 110 - 370 ug/dL 215     Had EGD-colonoscopy in 1/2019    Specimen: A: GASTRIC POLYP   B: CECAL POLYP   C: POLYP DESCENDING COLON     DIAGNOSIS:   A.     STOMACH (POLYP), BIOPSY:        -     FUNDIC GLAND POLYP.        -     NEGATIVE FOR INTESTINAL METAPLASIA, DYSPLASIA AND MALIGNANCY.     B.     COLON, CECUM (POLYP), BIOPSY:        -     TUBULAR ADENOMA FRAGMENTS.        -     NEGATIVE FOR HIGH GRADE DYSPLASIA OR MALIGNANCY.     C.     COLON, DESCENDING (POLYP), BIOPSY:        -     SCHWANN CELL HAMARTOMA.        -     ASSOCIATED HYPERPLASTIC CHANGE.        -     NEGATIVE FOR DYSPLASIA OR MALIGNANCY.

## 2021-12-21 NOTE — PROGRESS NOTES
Virtual Visit: Established Patient   This visit was conducted via Zoom using secure and encrypted videoconferencing technology.   The patient was in a private location in the state of [place].    The patient's identity was confirmed and verbal consent was obtained for this virtual visit.    Subjective:   CC:   Chief Complaint   Patient presents with   • Follow-Up     Liver US,Fatty liver   • Lab Results       Olive Alexandra is a 81 y.o. female presenting for evaluation and management of:    Carcinoma of uterus (HCC)  h/o Stage IIIB Endometrial CA s/p Xi RA hysterectomy, HARIS, RP mass excision, appendectomy, cytoreduction, enterolysis, omental resection on 1/18/2019 for grade II endometrial adenocarcinoma with final pathology revealing 100% invasion, +LVSI, no LN involvement. 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 She is s/p 5040 cGy  IMRT with Dr Crews as of June 2019. used to see Ash Kuo MD    Pending appointment with Dr. Brian Dalal        Iron deficiency  On ferrous sulfate 325 mg BID  Iron panel is stable     Ref. Range 9/9/2021 09:07   Ferritin Latest Ref Range: 10.0 - 291.0 ng/mL 106.0      Ref. Range 9/9/2021 09:07   Iron Latest Ref Range: 40 - 170 ug/dL 61   Total Iron Binding Latest Ref Range: 250 - 450 ug/dL 276   % Saturation Latest Ref Range: 15 - 55 % 22   Unsat Iron Binding Latest Ref Range: 110 - 370 ug/dL 215     Had EGD-colonoscopy in 1/2019    Specimen: A: GASTRIC POLYP   B: CECAL POLYP   C: POLYP DESCENDING COLON     DIAGNOSIS:   A.     STOMACH (POLYP), BIOPSY:        -     FUNDIC GLAND POLYP.        -     NEGATIVE FOR INTESTINAL METAPLASIA, DYSPLASIA AND MALIGNANCY.     B.     COLON, CECUM (POLYP), BIOPSY:        -     TUBULAR ADENOMA FRAGMENTS.        -     NEGATIVE FOR HIGH GRADE DYSPLASIA OR MALIGNANCY.     C.     COLON, DESCENDING  (POLYP), BIOPSY:        -     SCHWANN CELL HAMARTOMA.        -     ASSOCIATED HYPERPLASTIC CHANGE.        -     NEGATIVE FOR DYSPLASIA OR MALIGNANCY.          Elevated alkaline phosphatase level  Referral to GI     Ref. Range 12/10/2021 08:35   Gamma Gt Latest Ref Range: 7 - 34 U/L 180 (H)      Ref. Range 12/10/2021 08:35   Alkaline Phosphatase Latest Ref Range: 40 - 120 U/L 232 (H)   Bone Fractions Latest Ref Range: 0 - 55 U/L 60 (H)   Liver Fractions Latest Ref Range: 0 - 94 U/L 172 (H)   Alk Phos Other Calc Latest Units: U/L 0      Ref. Range 9/9/2021 09:07   Alkaline Phosphatase Latest Ref Range: 30 - 99 U/L 162 (H)   12/16/2021 11:40 AM     HISTORY/REASON FOR EXAM:  Abnormal Labs  Abnormal liver laboratory values     TECHNIQUE/EXAM DESCRIPTION AND NUMBER OF VIEWS:  Real-time sonography of the liver and biliary tree.     COMPARISON: None     FINDINGS:  The liver is normal in contour with increased echogenicity. There is no evidence of solid mass lesion. The liver measures 15.58 cm.     The gallbladder is normal. There is no evidence of cholelithiasis.  The gallbladder wall thickness measures 0.33 cm. There is no pericholecystic fluid.  The common duct measures 0.63 cm.     The visualized pancreas is unremarkable.  The visualized aorta is normal in caliber.     Intrahepatic IVC is patent.     The portal vein is patent with hepatopetal flow. The MPV measures 1.12 cm.     The right kidney measures 10.23 cm.     There is no ascites.     IMPRESSION:     1.  Increased liver echogenicity could be due to hepatic steatosis or cirrhosis.     2.  No other abnormalities identified.      Chronic use of opiate for therapeutic purpose  She is on [place] 7.5-325 every 8 hours as needed for her chronic knee and hip pain.  She tried physical therapy before.   She declines going to pain management.  Urine drug screen test was collected on October 5, 2021      Bilateral hip pain  Bilateral chronic hip pain  Controlled on norco  7.5-325 every 9 hour as needed          ROS   Constitutional: Denies fevers or chills  Eyes: Denies changes in vision  Ears/Nose/Throat/Mouth: Denies nasal congestion or sore throat   Cardiovascular: Denies chest pain or palpitations   Respiratory: Denies shortness of breath , Denies cough  Gastrointestinal/Hepatic: Denies abd pain, nausea, vomiting   Genitourinary: Denies dysuria or frequency  Musculoskeletal/Rheum: Chronic knee pain, chronic hip pain  Neurological: Denies headache  Psychiatric: Stable  Endocrine:  Diabetes Denies hx of thyroid dysfunction  Heme/Oncology/Lymph Nodes: Denies weight changes or enlarged LNs.      Current medicines (including changes today)  Current Outpatient Medications   Medication Sig Dispense Refill   • losartan (COZAAR) 50 MG Tab      • [START ON 1/7/2022] HYDROcodone-acetaminophen (NORCO) 7.5-325 MG per tablet Take 1 Tablet by mouth every 8 hours as needed for Severe Pain for up to 30 days. 1/7/2022- 2/6/2022 60 Tablet 0   • [START ON 2/6/2022] HYDROcodone-acetaminophen (NORCO) 7.5-325 MG per tablet Take 1 Tablet by mouth every 8 hours as needed for Severe Pain for up to 30 days. 2/6/2022-3/8/2022 60 Tablet 0   • [START ON 3/8/2022] HYDROcodone-acetaminophen (NORCO) 7.5-325 MG per tablet Take 1 Tablet by mouth every 8 hours as needed for Severe Pain for up to 30 days. 3/8/2022-4/7/2022 60 Tablet 0   • ferrous sulfate 325 (65 Fe) MG tablet Take 1 Tablet by mouth 2 times a day. 180 Tablet 2   • ascorbic acid (VITAMIN C) 100 MG tablet      • vitamin D3, cholecalciferol, 400 UNIT Tab tablet Take 10 mcg by mouth every day.     • B Complex-Folic Acid (B COMPLEX-VITAMIN B12 PO) Take 1 Tablet by mouth.     • citalopram (CELEXA) 20 MG Tab Take 1 Tablet by mouth every day. 90 Tablet 3   • pravastatin (PRAVACHOL) 20 MG Tab Take 1 Tablet by mouth at bedtime. 90 Tablet 3   • doxycycline (VIBRAMYCIN) 100 MG Cap  (Patient not taking: Reported on 12/21/2021)     • polyethylene glycol 3350  "(MIRALAX) 17 GM/SCOOP Powder Take 17 g by mouth every day. (Patient not taking: Reported on 10/5/2021)       No current facility-administered medications for this visit.       Patient Active Problem List    Diagnosis Date Noted   • Chronic use of opiate for therapeutic purpose 10/05/2021   • Prediabetes 10/05/2021   • Elevated alkaline phosphatase level 10/05/2021   • Bilateral hip pain 10/05/2021   • Skin cancer screening 08/23/2021   • Macular degeneration of both eyes 08/23/2021   • Iron deficiency 08/23/2021   • Bilateral chronic knee pain 08/23/2021   • Mild depression (HCC) 08/23/2021   • Unable to walk 08/23/2021   • Partial tear of right rotator cuff 08/23/2021   • History of partial surgical removal of colon 03/17/2019   • Carcinoid tumor of appendix 02/05/2019   • 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  Skin: No rashes in visible areas.  Eye: Round. Conjunctiva clear, lids normal. No icterus.   ENMT: Lips pink without lesions, good dentition, moist mucous membranes. Phonation normal.  Neck: No masses, no thyromegaly. Moves freely without pain.  Respiratory: Unlabored respiratory effort, no cough or audible wheeze  Psych: Alert and oriented x3, normal affect and mood.     Assessment and Plan:   The following treatment plan was discussed:     1. Elevated serum GGT level  - Referral to Gastroenterology  - Comp Metabolic Panel; Future  - MITOCHONDRIAL (M2) AB; Future  - GAMMA GT (GGT); Future  - ALKALINE PHOSPHATASE ISOENZYMES; Future    2. Elevated alkaline phosphatase level  - " Referral to Gastroenterology  - Comp Metabolic Panel; Future  - MITOCHONDRIAL (M2) AB; Future  - GAMMA GT (GGT); Future  - ALKALINE PHOSPHATASE ISOENZYMES; Future    3. Chronic use of opiate for therapeutic purpose  4. Bilateral chronic knee pain  5. Bilateral hip pain  - HYDROcodone-acetaminophen (NORCO) 7.5-325 MG per tablet; Take 1 Tablet by mouth every 8 hours as needed for Severe Pain for up to 30 days. 1/7/2022- 2/6/2022  Dispense: 60 Tablet; Refill: 0  - HYDROcodone-acetaminophen (NORCO) 7.5-325 MG per tablet; Take 1 Tablet by mouth every 8 hours as needed for Severe Pain for up to 30 days. 2/6/2022-3/8/2022  Dispense: 60 Tablet; Refill: 0  - HYDROcodone-acetaminophen (NORCO) 7.5-325 MG per tablet; Take 1 Tablet by mouth every 8 hours as needed for Severe Pain for up to 30 days. 3/8/2022-4/7/2022  Dispense: 60 Tablet; Refill: 0    A Controlled Substance Agreement has been signed within the last year. A urine drug screen has been done in the past year.        The patient reports that pain is well controlled with the current treatment plan  They were counseled on other means to help with pain  This patient is continuing to use a controlled substance (CS) on a long term basis.  The patient is thoroughly aware of the goals of treatment with the CS  The patient is aware that yearly and random urine drug screens are required.  The patient has been instructed to take the CS only as prescribed.  The patient is prohibited from sharing the CS with any other person.  The patient is instructed to inform the provider if any other CS is taken, of any alcohol or cannabis or other recreational drug use, any treatment for side effects of the CS or complications, if they have CS active rx in other states  The patient has evidence for a reason for the CS  The treatment plan has been discussed with the patient  The  report has been reviewed    6. Carcinoma of uterus (HCC)  h/o Stage IIIB Endometrial CA s/p Xi RA hysterectomy,  HARIS, RP mass excision, appendectomy, cytoreduction, enterolysis, omental resection on 1/18/2019 for grade II endometrial adenocarcinoma with final pathology revealing 100% invasion, +LVSI, no LN involvement. 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 She is s/p 5040 cGy  IMRT with Dr Crews as of June 2019. used to see Ash Kuo MD    Pending appointment with Dr. Brian Dalal      7. Iron deficiency  On iron supplement    Had EGD-colonoscopy in 1/2019    Specimen: A: GASTRIC POLYP   B: CECAL POLYP   C: POLYP DESCENDING COLON     DIAGNOSIS:   A.     STOMACH (POLYP), BIOPSY:        -     FUNDIC GLAND POLYP.        -     NEGATIVE FOR INTESTINAL METAPLASIA, DYSPLASIA AND MALIGNANCY.     B.     COLON, CECUM (POLYP), BIOPSY:        -     TUBULAR ADENOMA FRAGMENTS.        -     NEGATIVE FOR HIGH GRADE DYSPLASIA OR MALIGNANCY.     C.     COLON, DESCENDING (POLYP), BIOPSY:        -     SCHWANN CELL HAMARTOMA.        -     ASSOCIATED HYPERPLASTIC CHANGE.        -     NEGATIVE FOR DYSPLASIA OR MALIGNANCY.          Follow-up: Return if symptoms worsen or fail to improve.

## 2021-12-21 NOTE — ASSESSMENT & PLAN NOTE
h/o Stage IIIB Endometrial CA s/p Xi RA hysterectomy, HARIS, RP mass excision, appendectomy, cytoreduction, enterolysis, omental resection on 1/18/2019 for grade II endometrial adenocarcinoma with final pathology revealing 100% invasion, +LVSI, no LN involvement. 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 She is s/p 5040 cGy  IMRT with Dr Crews as of June 2019. used to see Ash Kuo MD    Pending appointment with Dr. Brian Dalal

## 2021-12-21 NOTE — ASSESSMENT & PLAN NOTE
She is on [place] 7.5-325 every 8 hours as needed for her chronic knee and hip pain.  She tried physical therapy before.   She declines going to pain management.  Urine drug screen test was collected on October 5, 2021

## 2021-12-21 NOTE — ASSESSMENT & PLAN NOTE
Referral to GI     Ref. Range 12/10/2021 08:35   Gamma Gt Latest Ref Range: 7 - 34 U/L 180 (H)      Ref. Range 12/10/2021 08:35   Alkaline Phosphatase Latest Ref Range: 40 - 120 U/L 232 (H)   Bone Fractions Latest Ref Range: 0 - 55 U/L 60 (H)   Liver Fractions Latest Ref Range: 0 - 94 U/L 172 (H)   Alk Phos Other Calc Latest Units: U/L 0      Ref. Range 9/9/2021 09:07   Alkaline Phosphatase Latest Ref Range: 30 - 99 U/L 162 (H)   12/16/2021 11:40 AM     HISTORY/REASON FOR EXAM:  Abnormal Labs  Abnormal liver laboratory values     TECHNIQUE/EXAM DESCRIPTION AND NUMBER OF VIEWS:  Real-time sonography of the liver and biliary tree.     COMPARISON: None     FINDINGS:  The liver is normal in contour with increased echogenicity. There is no evidence of solid mass lesion. The liver measures 15.58 cm.     The gallbladder is normal. There is no evidence of cholelithiasis.  The gallbladder wall thickness measures 0.33 cm. There is no pericholecystic fluid.  The common duct measures 0.63 cm.     The visualized pancreas is unremarkable.  The visualized aorta is normal in caliber.     Intrahepatic IVC is patent.     The portal vein is patent with hepatopetal flow. The MPV measures 1.12 cm.     The right kidney measures 10.23 cm.     There is no ascites.     IMPRESSION:     1.  Increased liver echogenicity could be due to hepatic steatosis or cirrhosis.     2.  No other abnormalities identified.

## 2022-02-03 ENCOUNTER — HOSPITAL ENCOUNTER (OUTPATIENT)
Dept: LAB | Facility: MEDICAL CENTER | Age: 82
End: 2022-02-03
Attending: INTERNAL MEDICINE
Payer: MEDICARE

## 2022-02-03 ENCOUNTER — HOSPITAL ENCOUNTER (OUTPATIENT)
Dept: LAB | Facility: MEDICAL CENTER | Age: 82
End: 2022-02-03
Attending: SPECIALIST
Payer: MEDICARE

## 2022-02-03 LAB
ANION GAP SERPL CALC-SCNC: 8 MMOL/L (ref 7–16)
BUN SERPL-MCNC: 22 MG/DL (ref 8–22)
CALCIUM SERPL-MCNC: 9.9 MG/DL (ref 8.5–10.5)
CANCER AG125 SERPL-ACNC: 17.6 U/ML (ref 0–35)
CHLORIDE SERPL-SCNC: 101 MMOL/L (ref 96–112)
CO2 SERPL-SCNC: 30 MMOL/L (ref 20–33)
CREAT SERPL-MCNC: 0.47 MG/DL (ref 0.5–1.4)
GLUCOSE SERPL-MCNC: 100 MG/DL (ref 65–99)
HBV CORE AB SERPL QL IA: NONREACTIVE
HBV SURFACE AB SERPL IA-ACNC: <3.5 MIU/ML (ref 0–10)
HBV SURFACE AG SER QL: NORMAL
HCV AB SER QL: NORMAL
POTASSIUM SERPL-SCNC: 4.8 MMOL/L (ref 3.6–5.5)
SODIUM SERPL-SCNC: 139 MMOL/L (ref 135–145)

## 2022-02-03 PROCEDURE — 86803 HEPATITIS C AB TEST: CPT

## 2022-02-03 PROCEDURE — 86304 IMMUNOASSAY TUMOR CA 125: CPT

## 2022-02-03 PROCEDURE — 87340 HEPATITIS B SURFACE AG IA: CPT | Mod: GA

## 2022-02-03 PROCEDURE — 36415 COLL VENOUS BLD VENIPUNCTURE: CPT

## 2022-02-03 PROCEDURE — 86708 HEPATITIS A ANTIBODY: CPT

## 2022-02-03 PROCEDURE — 80048 BASIC METABOLIC PNL TOTAL CA: CPT

## 2022-02-03 PROCEDURE — 86038 ANTINUCLEAR ANTIBODIES: CPT

## 2022-02-03 PROCEDURE — 86706 HEP B SURFACE ANTIBODY: CPT | Mod: GA

## 2022-02-03 PROCEDURE — 86381 MITOCHONDRIAL ANTIBODY EACH: CPT

## 2022-02-03 PROCEDURE — 86704 HEP B CORE ANTIBODY TOTAL: CPT | Mod: GA

## 2022-02-04 LAB
HAV AB SER QL IA: POSITIVE
MITOCHONDRIA M2 IGG SER-ACNC: 138.8 UNITS (ref 0–24.9)
NUCLEAR IGG SER QL IA: NORMAL

## 2022-02-17 ENCOUNTER — HOSPITAL ENCOUNTER (OUTPATIENT)
Dept: LAB | Facility: MEDICAL CENTER | Age: 82
End: 2022-02-17
Attending: INTERNAL MEDICINE
Payer: MEDICARE

## 2022-02-17 LAB — 25(OH)D3 SERPL-MCNC: 45 NG/ML (ref 30–100)

## 2022-02-17 PROCEDURE — 36415 COLL VENOUS BLD VENIPUNCTURE: CPT

## 2022-02-17 PROCEDURE — 84590 ASSAY OF VITAMIN A: CPT

## 2022-02-17 PROCEDURE — 84630 ASSAY OF ZINC: CPT

## 2022-02-17 PROCEDURE — 82306 VITAMIN D 25 HYDROXY: CPT | Mod: GA

## 2022-02-17 PROCEDURE — 84446 ASSAY OF VITAMIN E: CPT

## 2022-02-18 ENCOUNTER — HOSPITAL ENCOUNTER (OUTPATIENT)
Dept: RADIOLOGY | Facility: MEDICAL CENTER | Age: 82
End: 2022-02-18
Attending: SPECIALIST
Payer: MEDICARE

## 2022-02-18 DIAGNOSIS — C54.1 MALIGNANT NEOPLASM OF ENDOMETRIUM (HCC): ICD-10-CM

## 2022-02-18 PROCEDURE — 71250 CT THORAX DX C-: CPT | Mod: MH

## 2022-02-20 LAB
A-TOCOPHEROL VIT E SERPL-MCNC: 19.2 MG/L (ref 5.5–18)
ANNOTATION COMMENT IMP: NORMAL
BETA+GAMMA TOCOPHEROL SERPL-MCNC: <0.2 MG/L (ref 0–6)
RETINYL PALMITATE SERPL-MCNC: <0.02 MG/L (ref 0–0.1)
VIT A SERPL-MCNC: 0.46 MG/L (ref 0.3–1.2)

## 2022-02-23 LAB — ZINC SERPL-MCNC: 114.4 UG/DL (ref 60–120)

## 2022-03-23 ENCOUNTER — HOSPITAL ENCOUNTER (OUTPATIENT)
Dept: LAB | Facility: MEDICAL CENTER | Age: 82
End: 2022-03-23
Attending: INTERNAL MEDICINE
Payer: MEDICARE

## 2022-03-23 LAB
ALBUMIN SERPL BCP-MCNC: 4.1 G/DL (ref 3.2–4.9)
ALBUMIN/GLOB SERPL: 1.7 G/DL
ALP SERPL-CCNC: 163 U/L (ref 30–99)
ALT SERPL-CCNC: 16 U/L (ref 2–50)
ANION GAP SERPL CALC-SCNC: 11 MMOL/L (ref 7–16)
AST SERPL-CCNC: 19 U/L (ref 12–45)
BILIRUB SERPL-MCNC: 0.6 MG/DL (ref 0.1–1.5)
BUN SERPL-MCNC: 16 MG/DL (ref 8–22)
CALCIUM SERPL-MCNC: 9.9 MG/DL (ref 8.5–10.5)
CHLORIDE SERPL-SCNC: 100 MMOL/L (ref 96–112)
CO2 SERPL-SCNC: 28 MMOL/L (ref 20–33)
CREAT SERPL-MCNC: 0.53 MG/DL (ref 0.5–1.4)
GFR SERPLBLD CREATININE-BSD FMLA CKD-EPI: 93 ML/MIN/1.73 M 2
GLOBULIN SER CALC-MCNC: 2.4 G/DL (ref 1.9–3.5)
GLUCOSE SERPL-MCNC: 95 MG/DL (ref 65–99)
POTASSIUM SERPL-SCNC: 5.2 MMOL/L (ref 3.6–5.5)
PROT SERPL-MCNC: 6.5 G/DL (ref 6–8.2)
SODIUM SERPL-SCNC: 139 MMOL/L (ref 135–145)

## 2022-03-23 PROCEDURE — 36415 COLL VENOUS BLD VENIPUNCTURE: CPT

## 2022-03-23 PROCEDURE — 80053 COMPREHEN METABOLIC PANEL: CPT

## 2022-03-29 ENCOUNTER — OFFICE VISIT (OUTPATIENT)
Dept: MEDICAL GROUP | Facility: MEDICAL CENTER | Age: 82
End: 2022-03-29
Payer: MEDICARE

## 2022-03-29 VITALS
HEART RATE: 72 BPM | RESPIRATION RATE: 16 BRPM | OXYGEN SATURATION: 96 % | WEIGHT: 275 LBS | BODY MASS INDEX: 43.16 KG/M2 | HEIGHT: 67 IN | TEMPERATURE: 97.9 F | DIASTOLIC BLOOD PRESSURE: 72 MMHG | SYSTOLIC BLOOD PRESSURE: 138 MMHG

## 2022-03-29 DIAGNOSIS — E61.1 IRON DEFICIENCY: ICD-10-CM

## 2022-03-29 DIAGNOSIS — E78.5 HYPERLIPIDEMIA, UNSPECIFIED HYPERLIPIDEMIA TYPE: ICD-10-CM

## 2022-03-29 DIAGNOSIS — I10 ESSENTIAL HYPERTENSION: ICD-10-CM

## 2022-03-29 DIAGNOSIS — M25.561 BILATERAL CHRONIC KNEE PAIN: ICD-10-CM

## 2022-03-29 DIAGNOSIS — Z79.891 CHRONIC USE OF OPIATE FOR THERAPEUTIC PURPOSE: ICD-10-CM

## 2022-03-29 DIAGNOSIS — M25.551 BILATERAL HIP PAIN: ICD-10-CM

## 2022-03-29 DIAGNOSIS — M25.552 BILATERAL HIP PAIN: ICD-10-CM

## 2022-03-29 DIAGNOSIS — R74.8 ELEVATED ALKALINE PHOSPHATASE LEVEL: ICD-10-CM

## 2022-03-29 DIAGNOSIS — M25.562 BILATERAL CHRONIC KNEE PAIN: ICD-10-CM

## 2022-03-29 DIAGNOSIS — G89.29 BILATERAL CHRONIC KNEE PAIN: ICD-10-CM

## 2022-03-29 DIAGNOSIS — K74.3 PRIMARY BILIARY CHOLANGITIS (HCC): ICD-10-CM

## 2022-03-29 PROCEDURE — 99214 OFFICE O/P EST MOD 30 MIN: CPT | Performed by: INTERNAL MEDICINE

## 2022-03-29 RX ORDER — ANTIOX #8/OM3/DHA/EPA/LUT/ZEAX 250-2.5 MG
1 CAPSULE ORAL 2 TIMES DAILY
COMMUNITY
Start: 2020-01-15 | End: 2024-01-01

## 2022-03-29 RX ORDER — URSODIOL 300 MG/1
300 CAPSULE ORAL 2 TIMES DAILY WITH MEALS
COMMUNITY
Start: 2022-03-07 | End: 2024-01-01 | Stop reason: SDUPTHER

## 2022-03-29 RX ORDER — HYDROCODONE BITARTRATE AND ACETAMINOPHEN 7.5; 325 MG/1; MG/1
1 TABLET ORAL EVERY 12 HOURS PRN
Qty: 60 TABLET | Refills: 0 | Status: SHIPPED | OUTPATIENT
Start: 2022-05-09 | End: 2022-05-25

## 2022-03-29 RX ORDER — LOSARTAN POTASSIUM 100 MG/1
100 TABLET ORAL DAILY
Qty: 90 TABLET | Refills: 2 | Status: SHIPPED | OUTPATIENT
Start: 2022-03-29 | End: 2022-08-25 | Stop reason: SDUPTHER

## 2022-03-29 RX ORDER — HYDROCODONE BITARTRATE AND ACETAMINOPHEN 7.5; 325 MG/1; MG/1
1 TABLET ORAL EVERY 12 HOURS PRN
Qty: 60 TABLET | Refills: 0 | Status: SHIPPED | OUTPATIENT
Start: 2022-06-08 | End: 2022-07-08

## 2022-03-29 RX ORDER — URSODIOL 300 MG/1
1 CAPSULE ORAL 2 TIMES DAILY
COMMUNITY
Start: 2022-03-07 | End: 2022-03-29

## 2022-03-29 RX ORDER — HYDROCODONE BITARTRATE AND ACETAMINOPHEN 7.5; 325 MG/1; MG/1
1 TABLET ORAL EVERY 12 HOURS PRN
Qty: 60 TABLET | Refills: 0 | Status: SHIPPED | OUTPATIENT
Start: 2022-04-09 | End: 2022-05-09

## 2022-03-29 RX ORDER — HYDROCODONE BITARTRATE AND ACETAMINOPHEN 7.5; 325 MG/1; MG/1
1 TABLET ORAL EVERY 12 HOURS PRN
Qty: 60 TABLET | Refills: 0 | Status: SHIPPED | OUTPATIENT
Start: 2022-06-08 | End: 2022-03-29 | Stop reason: SDUPTHER

## 2022-03-29 ASSESSMENT — FIBROSIS 4 INDEX: FIB4 SCORE: 1.65

## 2022-03-29 ASSESSMENT — PATIENT HEALTH QUESTIONNAIRE - PHQ9: CLINICAL INTERPRETATION OF PHQ2 SCORE: 0

## 2022-03-29 NOTE — PROGRESS NOTES
Established Patient    Olive Alexandra is a 82 y.o. female who presents today with the following:    CC:   Chief Complaint   Patient presents with   • Medication Refill     Hydrocodone 2 a day   • Follow-Up       HPI:       Problem   Primary Biliary Cholangitis (Hcc)    Improving  Itching and elevated ALP liver fraction  On jeremy  ALP is improving, itching somewhat improving  Following with Dr. Davies     Chronic Use of Opiate for Therapeutic Purpose    She is on [place] 7.5-325 every 12 hours as needed for her chronic knee and hip pain.  She tried physical therapy before.   She declines going to pain management.  She declines trial of acupuncture.   Urine drug screen test was collected on October 5, 2021     Elevated Alkaline Phosphatase Level    She was found to have Primary biliary cholangitis  Following with [facility] Dr. Davies  ALP improving with jeremy     Ref. Range 3/23/2022 08:29   Alkaline Phosphatase Latest Ref Range: 30 - 99 U/L 163 (H)      Ref. Range 12/10/2021 08:35   Gamma Gt Latest Ref Range: 7 - 34 U/L 180 (H)      Ref. Range 12/10/2021 08:35   Alkaline Phosphatase Latest Ref Range: 40 - 120 U/L 232 (H)   Bone Fractions Latest Ref Range: 0 - 55 U/L 60 (H)   Liver Fractions Latest Ref Range: 0 - 94 U/L 172 (H)   Alk Phos Other Calc Latest Units: U/L 0      Ref. Range 9/9/2021 09:07   Alkaline Phosphatase Latest Ref Range: 30 - 99 U/L 162 (H)   12/16/2021 11:40 AM     HISTORY/REASON FOR EXAM:  Abnormal Labs  Abnormal liver laboratory values     TECHNIQUE/EXAM DESCRIPTION AND NUMBER OF VIEWS:  Real-time sonography of the liver and biliary tree.     COMPARISON: None     FINDINGS:  The liver is normal in contour with increased echogenicity. There is no evidence of solid mass lesion. The liver measures 15.58 cm.     The gallbladder is normal. There is no evidence of cholelithiasis.  The gallbladder wall thickness measures 0.33 cm. There is no pericholecystic fluid.  The common duct measures  0.63 cm.     The visualized pancreas is unremarkable.  The visualized aorta is normal in caliber.     Intrahepatic IVC is patent.     The portal vein is patent with hepatopetal flow. The MPV measures 1.12 cm.     The right kidney measures 10.23 cm.     There is no ascites.     IMPRESSION:     1.  Increased liver echogenicity could be due to hepatic steatosis or cirrhosis.     2.  No other abnormalities identified.     Bilateral Hip Pain    Bilateral chronic hip pain  Controlled on norco 7.5-325 every 12 hour as needed     Iron Deficiency    On ferrous sulfate 325 mg BID  Iron panel is stable     Ref. Range 9/9/2021 09:07   Ferritin Latest Ref Range: 10.0 - 291.0 ng/mL 106.0      Ref. Range 9/9/2021 09:07   Iron Latest Ref Range: 40 - 170 ug/dL 61   Total Iron Binding Latest Ref Range: 250 - 450 ug/dL 276   % Saturation Latest Ref Range: 15 - 55 % 22   Unsat Iron Binding Latest Ref Range: 110 - 370 ug/dL 215     Had EGD-colonoscopy in 1/2019    Specimen: A: GASTRIC POLYP   B: CECAL POLYP   C: POLYP DESCENDING COLON     DIAGNOSIS:   A.     STOMACH (POLYP), BIOPSY:        -     FUNDIC GLAND POLYP.        -     NEGATIVE FOR INTESTINAL METAPLASIA, DYSPLASIA AND MALIGNANCY.     B.     COLON, CECUM (POLYP), BIOPSY:        -     TUBULAR ADENOMA FRAGMENTS.        -     NEGATIVE FOR HIGH GRADE DYSPLASIA OR MALIGNANCY.     C.     COLON, DESCENDING (POLYP), BIOPSY:        -     SCHWANN CELL HAMARTOMA.        -     ASSOCIATED HYPERPLASTIC CHANGE.        -     NEGATIVE FOR DYSPLASIA OR MALIGNANCY.         Bilateral Chronic Knee Pain    Chronic knee pain on Norco 7.5-325 Q12H prn  Now she cut down to 60 tablets per month     Hyperlipidemia    On pravastatin     Essential Hypertension    Slightly uncontrolled, increase losartan from 50 mg to 100 mg daily          Current Outpatient Medications   Medication Sig Dispense Refill   • Multiple Vitamins-Minerals (PRESERVISION AREDS 2) Cap      • ursodiol (ACTIGALL) 300 MG Cap Take 300 mg  by mouth 2 times a day.     • [START ON 4/9/2022] HYDROcodone-acetaminophen (NORCO) 7.5-325 MG tab Take 1 Tablet by mouth every 12 hours as needed for Severe Pain for up to 30 days. 4/9/2022-5/9/2022 60 Tablet 0   • [START ON 5/9/2022] HYDROcodone-acetaminophen (NORCO) 7.5-325 MG tab Take 1 Tablet by mouth every 12 hours as needed for Severe Pain for up to 30 days. 5/9/2022-6/8/22 60 Tablet 0   • losartan (COZAAR) 100 MG Tab Take 1 Tablet by mouth every day. 90 Tablet 2   • [START ON 6/8/2022] HYDROcodone-acetaminophen (NORCO) 7.5-325 MG tab Take 1 Tablet by mouth every 12 hours as needed for Severe Pain for up to 30 days. 6/8/22-7/8/22 60 Tablet 0   • ferrous sulfate 325 (65 Fe) MG tablet Take 1 Tablet by mouth 2 times a day. 180 Tablet 2   • ascorbic acid (VITAMIN C) 100 MG tablet      • vitamin D3, cholecalciferol, 400 UNIT Tab tablet Take 10 mcg by mouth every day.     • polyethylene glycol 3350 (MIRALAX) 17 GM/SCOOP Powder Take 17 g by mouth every day.     • B Complex-Folic Acid (B COMPLEX-VITAMIN B12 PO) Take 1 Tablet by mouth.     • citalopram (CELEXA) 20 MG Tab Take 1 Tablet by mouth every day. 90 Tablet 3   • pravastatin (PRAVACHOL) 20 MG Tab Take 1 Tablet by mouth at bedtime. 90 Tablet 3     No current facility-administered medications for this visit.       Allergies, past medical history, past surgical history, medications, family history, social history reviewed and updated.    ROS   Constitutional: Denies fevers or chills  Eyes: Denies changes in vision  Ears/Nose/Throat/Mouth: Denies nasal congestion or sore throat   Cardiovascular: Denies chest pain or palpitations   Respiratory: Denies shortness of breath , Denies cough  Gastrointestinal/Hepatic: Denies abd pain, nausea, vomiting   Genitourinary: Denies dysuria or frequency  Musculoskeletal/Rheum: chronic hip, knee pain   Neurological: Denies headache  Psychiatric: mood stable  Endocrine: Denies hx of diabetes or thyroid  "dysfunction  Heme/Oncology/Lymph Nodes: Denies weight changes or enlarged LNs.    Physical Exam  Vitals: /72 (BP Location: Right arm, Patient Position: Sitting, BP Cuff Size: Adult)   Pulse 72   Temp 36.6 °C (97.9 °F) (Temporal)   Resp 16   Ht 1.702 m (5' 7\")   Wt 125 kg (275 lb)   SpO2 96%   BMI 43.07 kg/m²   General: Alert, pleasant, NAD  HEENT: Normocephalic.  EOMI, no icterus or pallor.  Conjunctivae and lids normal. External ears normal. Wearing a mask. Oropharynx non-erythematous, mucous membranes moist.  Neck supple.  No thyromegaly or masses palpated.   Lymph: No cervical or supraclavicular lymphadenopathy.  Cardiovascular: Regular rate and rhythm.   Respiratory: Normal respiratory effort.  Clear to auscultation bilaterally.  Abdomen: Non-distended, soft, non-tender  Skin: Warm, dry  Musculoskeletal: she is on a 3 wheel mobility scooter  Extremities: No leg edema.    Psych:  Affect/mood is normal, judgement is good, memory is intact, grooming is appropriate.        Assessment and Plan    Bilateral chronic knee pain  Bilateral hip pain  Chronic use of opiate for therapeutic purpose  - HYDROcodone-acetaminophen (NORCO) 7.5-325 MG tab; Take 1 Tablet by mouth every 12 hours as needed for Severe Pain for up to 30 days. 4/9/2022-5/9/2022  Dispense: 60 Tablet; Refill: 0  - HYDROcodone-acetaminophen (NORCO) 7.5-325 MG tab; Take 1 Tablet by mouth every 12 hours as needed for Severe Pain for up to 30 days. 5/9/2022-6/8/22  Dispense: 60 Tablet; Refill: 0  - HYDROcodone-acetaminophen (NORCO) 7.5-325 MG tab; Take 1 Tablet by mouth every 12 hours as needed for Severe Pain for up to 30 days. 6/8/22-7/8/22  Dispense: 60 Tablet; Refill: 0    A Controlled Substance Agreement has been signed within the last year. A urine drug screen has been done in the past year.        The patient reports that pain is well controlled with the current treatment plan  They were counseled on other means to help with pain  This " patient is continuing to use a controlled substance (CS) on a long term basis.  The patient is thoroughly aware of the goals of treatment with the CS  The patient is aware that yearly and random urine drug screens are required.  The patient has been instructed to take the CS only as prescribed.  The patient is prohibited from sharing the CS with any other person.  The patient is instructed to inform the provider if any other CS is taken, of any alcohol or cannabis or other recreational drug use, any treatment for side effects of the CS or complications, if they have CS active rx in other states  The patient has evidence for a reason for the CS  The treatment plan has been discussed with the patient  The  report has been reviewed    3. Essential hypertension  - losartan (COZAAR) 100 MG Tab; Take 1 Tablet by mouth every day.  Dispense: 90 Tablet; Refill: 2    4. Hyperlipidemia, unspecified hyperlipidemia type  Stable on statin    5. Primary biliary cholangitis (HCC)  6. Elevated alkaline phosphatase level  She was found to have Primary biliary cholangitis  Following with [facility] Dr. Davies  ALP improving with jeremy      8. Iron deficiency  On ferrous sulfate 325 mg BID  Iron panel is stable     Ref. Range 9/9/2021 09:07   Ferritin Latest Ref Range: 10.0 - 291.0 ng/mL 106.0      Ref. Range 9/9/2021 09:07   Iron Latest Ref Range: 40 - 170 ug/dL 61   Total Iron Binding Latest Ref Range: 250 - 450 ug/dL 276   % Saturation Latest Ref Range: 15 - 55 % 22   Unsat Iron Binding Latest Ref Range: 110 - 370 ug/dL 215     Had EGD-colonoscopy in 1/2019    Specimen: A: GASTRIC POLYP   B: CECAL POLYP   C: POLYP DESCENDING COLON     DIAGNOSIS:   A.     STOMACH (POLYP), BIOPSY:        -     FUNDIC GLAND POLYP.        -     NEGATIVE FOR INTESTINAL METAPLASIA, DYSPLASIA AND MALIGNANCY.     B.     COLON, CECUM (POLYP), BIOPSY:        -     TUBULAR ADENOMA FRAGMENTS.        -     NEGATIVE FOR HIGH GRADE DYSPLASIA OR  MALIGNANCY.     C.     COLON, DESCENDING (POLYP), BIOPSY:        -     SCHWANN CELL HAMARTOMA.        -     ASSOCIATED HYPERPLASTIC CHANGE.        -     NEGATIVE FOR DYSPLASIA OR MALIGNANCY.        Follow-up:Return in about 3 months (around 6/29/2022), or if symptoms worsen or fail to improve.    This note was created using voice recognition software. There may be unintended errors in spelling, grammar or content.

## 2022-03-29 NOTE — ASSESSMENT & PLAN NOTE
She is on [place] 7.5-325 every 12 hours as needed for her chronic knee and hip pain.  She tried physical therapy before.   She declines going to pain management.  She declines trial of acupuncture.   Urine drug screen test was collected on October 5, 2021

## 2022-03-29 NOTE — ASSESSMENT & PLAN NOTE
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 Davies  ALP improving with jeremy     Ref. Range 3/23/2022 08:29   Alkaline Phosphatase Latest Ref Range: 30 - 99 U/L 163 (H)      Ref. Range 12/10/2021 08:35   Gamma Gt Latest Ref Range: 7 - 34 U/L 180 (H)      Ref. Range 12/10/2021 08:35   Alkaline Phosphatase Latest Ref Range: 40 - 120 U/L 232 (H)   Bone Fractions Latest Ref Range: 0 - 55 U/L 60 (H)   Liver Fractions Latest Ref Range: 0 - 94 U/L 172 (H)   Alk Phos Other Calc Latest Units: U/L 0      Ref. Range 9/9/2021 09:07   Alkaline Phosphatase Latest Ref Range: 30 - 99 U/L 162 (H)   12/16/2021 11:40 AM     HISTORY/REASON FOR EXAM:  Abnormal Labs  Abnormal liver laboratory values     TECHNIQUE/EXAM DESCRIPTION AND NUMBER OF VIEWS:  Real-time sonography of the liver and biliary tree.     COMPARISON: None     FINDINGS:  The liver is normal in contour with increased echogenicity. There is no evidence of solid mass lesion. The liver measures 15.58 cm.     The gallbladder is normal. There is no evidence of cholelithiasis.  The gallbladder wall thickness measures 0.33 cm. There is no pericholecystic fluid.  The common duct measures 0.63 cm.     The visualized pancreas is unremarkable.  The visualized aorta is normal in caliber.     Intrahepatic IVC is patent.     The portal vein is patent with hepatopetal flow. The MPV measures 1.12 cm.     The right kidney measures 10.23 cm.     There is no ascites.     IMPRESSION:     1.  Increased liver echogenicity could be due to hepatic steatosis or cirrhosis.     2.  No other abnormalities identified.

## 2022-03-29 NOTE — ASSESSMENT & PLAN NOTE
Improving  Itching and elevated ALP liver fraction  On jeremy  ALP is improving, itching somewhat improving  Following with Dr. Davies

## 2022-04-19 ENCOUNTER — TELEMEDICINE (OUTPATIENT)
Dept: MEDICAL GROUP | Facility: MEDICAL CENTER | Age: 82
End: 2022-04-19
Payer: MEDICARE

## 2022-04-19 VITALS
WEIGHT: 275 LBS | DIASTOLIC BLOOD PRESSURE: 75 MMHG | SYSTOLIC BLOOD PRESSURE: 146 MMHG | BODY MASS INDEX: 44.2 KG/M2 | TEMPERATURE: 97.6 F | HEIGHT: 66 IN

## 2022-04-19 DIAGNOSIS — U07.1 COVID-19 VIRUS INFECTION: ICD-10-CM

## 2022-04-19 PROCEDURE — 99213 OFFICE O/P EST LOW 20 MIN: CPT | Mod: 95 | Performed by: INTERNAL MEDICINE

## 2022-04-19 ASSESSMENT — FIBROSIS 4 INDEX: FIB4 SCORE: 1.65

## 2022-04-19 NOTE — PROGRESS NOTES
Virtual Visit: Established Patient   This visit was conducted via Zoom using secure and encrypted videoconferencing technology.   The patient was in their home in the state of [place].    The patient's identity was confirmed and verbal consent was obtained for this virtual visit.     Subjective:   CC:   Chief Complaint   Patient presents with   • Nasal Congestion     X4 days    • Other     Tested positive for Covid 4 days ago       Olive Alexandra is a 82 y.o. female presenting for evaluation and management of:      Symptoms 4/15/2021 night, chills, fever T max 100.5 , nausea, dark diarrhea, runny nose, sinus congestion, clear sinus congestion  Cough with clear thin sputum, shortness of breath, chest pain, abdominal pain    She tested her home test Ag positive on 4/16/2022  Loss of smell and taste since 4/18/2022  Denies sore throat, bodyache    She took tylenol 500 mg as needed, hydration, resting    She started to feel better since 4/18/22, now her main symptoms are sinus congestion with clear sinus discharge, chest congestion. She still has loss of taste and smell.    Her fever, chills, nausea, diarrhea resolved.     Oxygen saturation 93-95%    COVID-19 Moderna three shots, fourth shot 4/11/2022    ROS   Per HPI    Current medicines (including changes today)  Current Outpatient Medications   Medication Sig Dispense Refill   • Multiple Vitamins-Minerals (PRESERVISION AREDS 2) Cap      • ursodiol (ACTIGALL) 300 MG Cap Take 300 mg by mouth 2 times a day.     • losartan (COZAAR) 100 MG Tab Take 1 Tablet by mouth every day. 90 Tablet 2   • [START ON 6/8/2022] HYDROcodone-acetaminophen (NORCO) 7.5-325 MG tab Take 1 Tablet by mouth every 12 hours as needed for Severe Pain for up to 30 days. 6/8/22-7/8/22 60 Tablet 0   • ferrous sulfate 325 (65 Fe) MG tablet Take 1 Tablet by mouth 2 times a day. 180 Tablet 2   • ascorbic acid (VITAMIN C) 100 MG tablet      • vitamin D3, cholecalciferol, 400 UNIT Tab tablet Take 10  "mcg by mouth every day.     • B Complex-Folic Acid (B COMPLEX-VITAMIN B12 PO) Take 1 Tablet by mouth.     • citalopram (CELEXA) 20 MG Tab Take 1 Tablet by mouth every day. 90 Tablet 3   • pravastatin (PRAVACHOL) 20 MG Tab Take 1 Tablet by mouth at bedtime. 90 Tablet 3   • HYDROcodone-acetaminophen (NORCO) 7.5-325 MG tab Take 1 Tablet by mouth every 12 hours as needed for Severe Pain for up to 30 days. 4/9/2022-5/9/2022 60 Tablet 0   • [START ON 5/9/2022] HYDROcodone-acetaminophen (NORCO) 7.5-325 MG tab Take 1 Tablet by mouth every 12 hours as needed for Severe Pain for up to 30 days. 5/9/2022-6/8/22 60 Tablet 0   • polyethylene glycol 3350 (MIRALAX) 17 GM/SCOOP Powder Take 17 g by mouth every day.       No current facility-administered medications for this visit.       Patient Active Problem List    Diagnosis Date Noted   • Primary biliary cholangitis (HCC) 03/29/2022   • Chronic use of opiate for therapeutic purpose 10/05/2021   • Prediabetes 10/05/2021   • Elevated alkaline phosphatase level 10/05/2021   • Bilateral hip pain 10/05/2021   • Skin cancer screening 08/23/2021   • Macular degeneration of both eyes 08/23/2021   • Iron deficiency 08/23/2021   • Bilateral chronic knee pain 08/23/2021   • Mild depression (HCC) 08/23/2021   • Unable to walk 08/23/2021   • Partial tear of right rotator cuff 08/23/2021   • History of partial surgical removal of colon 03/17/2019   • 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  Skin: No rashes in visible areas.  Eye: Round. Conjunctiva clear, lids normal. No icterus.   ENMT: Lips pink without lesions, good dentition, moist mucous membranes. Phonation normal.  Neck: No masses, no thyromegaly. Moves freely without pain.  Respiratory: Unlabored respiratory effort, no cough or audible wheeze  Psych: Alert and oriented x3, normal affect and mood.     Assessment and Plan:   The following treatment plan was discussed:      COVID-19 virus infection  --Increase fluid intake, rest.  --Nasal irrigation or a Neti-pot.  --Humidifier in room or hot shower/bath.  --warm salt water gargles for sore throat  --Tylenol as needed for aches and pain, fever.  --Guaifenesin as an expectorant (Mucinex, Robitussin, etc).  --Dextromethorphan for cough (Mucinex, Robitussin, etc).  --Intranasal steroids (Flonase, prescription or over-the-counter).  --Saline nasal spray  --Prevention: regular handwashing, facemask, social distancing, cover cough, avoid immunocompromised patients, and stay at home.  -- advised to monitor pulse oximetry at home and to maintain O2 sat 90% or above  -- may consider trying over the counter supplements to help with your symptoms: vitamin C 500mg two times daily, vitamin D3 2000 international units daily, zinc 75-100mg daily, melatonin 5-10mg before bedtime as needed  -- strict ED precautions discussed if worsening mental status, chest pain, shortness of breath, lack of urination, oxygen saturation drop <90%  -- We discussed about Paxlovid. Since she is feeling better, she decides to hold off on Paxlovid.     Follow-up: Return if symptoms worsen or fail to improve.

## 2022-05-23 SDOH — ECONOMIC STABILITY: HOUSING INSECURITY
IN THE LAST 12 MONTHS, WAS THERE A TIME WHEN YOU DID NOT HAVE A STEADY PLACE TO SLEEP OR SLEPT IN A SHELTER (INCLUDING NOW)?: NO

## 2022-05-23 SDOH — ECONOMIC STABILITY: INCOME INSECURITY: HOW HARD IS IT FOR YOU TO PAY FOR THE VERY BASICS LIKE FOOD, HOUSING, MEDICAL CARE, AND HEATING?: NOT HARD AT ALL

## 2022-05-23 SDOH — ECONOMIC STABILITY: HOUSING INSECURITY: IN THE LAST 12 MONTHS, HOW MANY PLACES HAVE YOU LIVED?: 2

## 2022-05-23 SDOH — HEALTH STABILITY: PHYSICAL HEALTH: ON AVERAGE, HOW MANY DAYS PER WEEK DO YOU ENGAGE IN MODERATE TO STRENUOUS EXERCISE (LIKE A BRISK WALK)?: 5 DAYS

## 2022-05-23 SDOH — ECONOMIC STABILITY: INCOME INSECURITY: IN THE LAST 12 MONTHS, WAS THERE A TIME WHEN YOU WERE NOT ABLE TO PAY THE MORTGAGE OR RENT ON TIME?: NO

## 2022-05-23 SDOH — ECONOMIC STABILITY: FOOD INSECURITY: WITHIN THE PAST 12 MONTHS, THE FOOD YOU BOUGHT JUST DIDN'T LAST AND YOU DIDN'T HAVE MONEY TO GET MORE.: NEVER TRUE

## 2022-05-23 SDOH — ECONOMIC STABILITY: TRANSPORTATION INSECURITY
IN THE PAST 12 MONTHS, HAS THE LACK OF TRANSPORTATION KEPT YOU FROM MEDICAL APPOINTMENTS OR FROM GETTING MEDICATIONS?: NO

## 2022-05-23 SDOH — ECONOMIC STABILITY: FOOD INSECURITY: WITHIN THE PAST 12 MONTHS, YOU WORRIED THAT YOUR FOOD WOULD RUN OUT BEFORE YOU GOT MONEY TO BUY MORE.: NEVER TRUE

## 2022-05-23 SDOH — HEALTH STABILITY: MENTAL HEALTH

## 2022-05-23 SDOH — ECONOMIC STABILITY: TRANSPORTATION INSECURITY
IN THE PAST 12 MONTHS, HAS LACK OF RELIABLE TRANSPORTATION KEPT YOU FROM MEDICAL APPOINTMENTS, MEETINGS, WORK OR FROM GETTING THINGS NEEDED FOR DAILY LIVING?: NO

## 2022-05-23 SDOH — ECONOMIC STABILITY: TRANSPORTATION INSECURITY
IN THE PAST 12 MONTHS, HAS LACK OF TRANSPORTATION KEPT YOU FROM MEETINGS, WORK, OR FROM GETTING THINGS NEEDED FOR DAILY LIVING?: NO

## 2022-05-23 SDOH — HEALTH STABILITY: PHYSICAL HEALTH: ON AVERAGE, HOW MANY MINUTES DO YOU ENGAGE IN EXERCISE AT THIS LEVEL?: 50 MIN

## 2022-05-23 ASSESSMENT — SOCIAL DETERMINANTS OF HEALTH (SDOH)
HOW OFTEN DO YOU ATTENT MEETINGS OF THE CLUB OR ORGANIZATION YOU BELONG TO?: MORE THAN 4 TIMES PER YEAR
HOW MANY DRINKS CONTAINING ALCOHOL DO YOU HAVE ON A TYPICAL DAY WHEN YOU ARE DRINKING: PATIENT DOES NOT DRINK
HOW OFTEN DO YOU ATTEND CHURCH OR RELIGIOUS SERVICES?: MORE THAN 4 TIMES PER YEAR
DO YOU BELONG TO ANY CLUBS OR ORGANIZATIONS SUCH AS CHURCH GROUPS UNIONS, FRATERNAL OR ATHLETIC GROUPS, OR SCHOOL GROUPS?: YES
WITHIN THE PAST 12 MONTHS, YOU WORRIED THAT YOUR FOOD WOULD RUN OUT BEFORE YOU GOT THE MONEY TO BUY MORE: NEVER TRUE
IN A TYPICAL WEEK, HOW MANY TIMES DO YOU TALK ON THE PHONE WITH FAMILY, FRIENDS, OR NEIGHBORS?: MORE THAN THREE TIMES A WEEK
HOW OFTEN DO YOU HAVE SIX OR MORE DRINKS ON ONE OCCASION: NEVER
DO YOU BELONG TO ANY CLUBS OR ORGANIZATIONS SUCH AS CHURCH GROUPS UNIONS, FRATERNAL OR ATHLETIC GROUPS, OR SCHOOL GROUPS?: YES
HOW OFTEN DO YOU ATTENT MEETINGS OF THE CLUB OR ORGANIZATION YOU BELONG TO?: MORE THAN 4 TIMES PER YEAR
HOW HARD IS IT FOR YOU TO PAY FOR THE VERY BASICS LIKE FOOD, HOUSING, MEDICAL CARE, AND HEATING?: NOT HARD AT ALL
HOW OFTEN DO YOU GET TOGETHER WITH FRIENDS OR RELATIVES?: MORE THAN THREE TIMES A WEEK
HOW OFTEN DO YOU ATTEND CHURCH OR RELIGIOUS SERVICES?: MORE THAN 4 TIMES PER YEAR
HOW OFTEN DO YOU HAVE A DRINK CONTAINING ALCOHOL: MONTHLY OR LESS
IN A TYPICAL WEEK, HOW MANY TIMES DO YOU TALK ON THE PHONE WITH FAMILY, FRIENDS, OR NEIGHBORS?: MORE THAN THREE TIMES A WEEK
HOW OFTEN DO YOU GET TOGETHER WITH FRIENDS OR RELATIVES?: MORE THAN THREE TIMES A WEEK

## 2022-05-23 ASSESSMENT — LIFESTYLE VARIABLES
SKIP TO QUESTIONS 9-10: 1
HOW MANY STANDARD DRINKS CONTAINING ALCOHOL DO YOU HAVE ON A TYPICAL DAY: PATIENT DOES NOT DRINK
HOW OFTEN DO YOU HAVE SIX OR MORE DRINKS ON ONE OCCASION: NEVER
AUDIT-C TOTAL SCORE: 1
HOW OFTEN DO YOU HAVE A DRINK CONTAINING ALCOHOL: MONTHLY OR LESS

## 2022-05-25 ENCOUNTER — OFFICE VISIT (OUTPATIENT)
Dept: MEDICAL GROUP | Facility: MEDICAL CENTER | Age: 82
End: 2022-05-25
Payer: MEDICARE

## 2022-05-25 VITALS
HEART RATE: 85 BPM | OXYGEN SATURATION: 91 % | TEMPERATURE: 98.2 F | SYSTOLIC BLOOD PRESSURE: 132 MMHG | DIASTOLIC BLOOD PRESSURE: 60 MMHG

## 2022-05-25 DIAGNOSIS — M25.561 BILATERAL CHRONIC KNEE PAIN: ICD-10-CM

## 2022-05-25 DIAGNOSIS — I10 ESSENTIAL HYPERTENSION: ICD-10-CM

## 2022-05-25 DIAGNOSIS — C7A.020 MALIGNANT CARCINOID TUMOR OF APPENDIX (HCC): ICD-10-CM

## 2022-05-25 DIAGNOSIS — R74.8 ELEVATED ALKALINE PHOSPHATASE LEVEL: ICD-10-CM

## 2022-05-25 DIAGNOSIS — M25.562 BILATERAL CHRONIC KNEE PAIN: ICD-10-CM

## 2022-05-25 DIAGNOSIS — G89.29 BILATERAL CHRONIC KNEE PAIN: ICD-10-CM

## 2022-05-25 DIAGNOSIS — F32.A MILD DEPRESSION: ICD-10-CM

## 2022-05-25 PROCEDURE — 99214 OFFICE O/P EST MOD 30 MIN: CPT | Performed by: INTERNAL MEDICINE

## 2022-05-25 RX ORDER — HYDROCODONE BITARTRATE AND ACETAMINOPHEN 7.5; 325 MG/1; MG/1
1 TABLET ORAL EVERY 12 HOURS PRN
Qty: 60 TABLET | Refills: 0 | Status: SHIPPED | OUTPATIENT
Start: 2022-08-09 | End: 2022-12-08 | Stop reason: SDUPTHER

## 2022-05-25 RX ORDER — AMLODIPINE BESYLATE 5 MG/1
5 TABLET ORAL DAILY
Qty: 90 TABLET | Refills: 1 | Status: SHIPPED | OUTPATIENT
Start: 2022-05-25 | End: 2022-08-25 | Stop reason: SDUPTHER

## 2022-05-25 RX ORDER — HYDROCODONE BITARTRATE AND ACETAMINOPHEN 7.5; 325 MG/1; MG/1
1 TABLET ORAL EVERY 12 HOURS PRN
Qty: 60 TABLET | Refills: 0 | Status: SHIPPED | OUTPATIENT
Start: 2022-07-09 | End: 2022-08-08

## 2022-05-25 ASSESSMENT — ENCOUNTER SYMPTOMS
NEUROLOGICAL NEGATIVE: 1
GASTROINTESTINAL NEGATIVE: 1
CHILLS: 0
FEVER: 0
PSYCHIATRIC NEGATIVE: 1
RESPIRATORY NEGATIVE: 1
SORE THROAT: 0

## 2022-05-25 NOTE — PROGRESS NOTES
Subjective:     Chief Complaint   Patient presents with   • Establish Care   • Hypertension Follow-up   • Medication Refill      Diagnoses of Bilateral chronic knee pain, Malignant carcinoid tumor of appendix (HCC), Elevated alkaline phosphatase level, Essential hypertension, and Mild depression (HCC) were pertinent to this visit.    HISTORY OF THE PRESENT ILLNESS: Patient is a 82 y.o. female. This pleasant patient is here today to establish care. Her prior PCP was Dr. Chacho Cabrajal.    Problem          Bilateral Chronic Knee Pain    Chronic knee pain on Norco 7.5-325 Q12H prn  Now she cut down to 60 tablets per month.   Pain is constant: weather makes it worse, patient is not able to ambulate due to multiple joint pain.  She is using a motorized scooter.  She is doing yoga/stretching 2-3 times per week.  PDMP reviewed today, no aberrant behavior, last fill of Norco was on May 9, 2022, 60 tablets, 30-day supply.  Patient still has 1 prescription for 30-day supply to take from June 8, 2022 till July 8, 2022.  Controlled substance agreement on file, urine drug screen obtained in October 2021.     Mild Depression (Hcc)    PHQ-9 Screening 3/29/2022 8/23/2021   Little interest or pleasure in doing things 0 - not at all 0 - not at all   Feeling down, depressed, or hopeless 0 - not at all 0 - not at all   PHQ-2 Total Score 0 0     Interpretation of PHQ-9 Total Score   Score Severity   1-4 No Depression   5-9 Mild Depression   10-14 Moderate Depression   15-19 Moderately Severe Depression   20-27 Severe Depression  Chronic, stable on citalopram 20 mg tablet daily.  No SI/HI.     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 She is s/p 5040 cGy  IMRT with Dr Crews as of June 2019. used to see Ash Kuo MD.     Dr. Brian Dalal, once a year  S/p radiation.      Essential Hypertension    Suboptimally controlled, recently increase losartan from 50 to 100 mg daily.  BP still runs in 140s at home.  Added amlodipine 5 mg tablet daily.  Patient will let me know if blood pressure remains persistently elevated and will make earlier appointment.       Past Medical History:   Diagnosis Date   • Anemia    • Depression    • Hyperlipidemia    • Hypertension    • Obesity    • Prediabetes    • Uterine cancer (HCC)      Past Surgical History:   Procedure Laterality Date   • ABDOMINAL HYSTERECTOMY TOTAL      1/18/2019   • COLON RESECTION      3/13/2019   • HERNIA REPAIR      4/29/17   • OPEN REDUCTION      right ankle suregery 1/2017. shoulder arthroscopy 8/2017   • OTHER      right shoulder arthroscopy 8/2017     Family History   Problem Relation Age of Onset   • Arthritis Mother    • No Known Problems Father    • No Known Problems Sister    • No Known Problems Brother    • Arthritis Maternal Aunt    • Arthritis Maternal Grandmother    • Arthritis Maternal Grandfather    • Lung Disease Maternal Grandfather         [place]   • Heart Disease Paternal Grandmother         MI age 70   • No Known Problems Brother      Social History     Tobacco Use   • Smoking status: Never Smoker   • Smokeless tobacco: Never Used   Vaping Use   • Vaping Use: Never used   Substance Use Topics   • Alcohol use: Yes     Comment: once martini every 3-4 month   • Drug use: Never     Current Outpatient Medications Ordered in Epic   Medication Sig Dispense Refill   • amLODIPine (NORVASC) 5 MG Tab Take 1 Tablet by mouth every day. 90 Tablet 1   • Multiple Vitamins-Minerals (PRESERVISION AREDS 2) Cap      • ursodiol (ACTIGALL) 300 MG Cap Take 300 mg  by mouth 2 times a day.     • HYDROcodone-acetaminophen (NORCO) 7.5-325 MG tab Take 1 Tablet by mouth every 12 hours as needed for Severe Pain for up to 30 days. 5/9/2022-6/8/22 60 Tablet 0   • losartan (COZAAR) 100 MG Tab Take 1 Tablet by mouth every day. 90 Tablet 2   • [START ON 6/8/2022] HYDROcodone-acetaminophen (NORCO) 7.5-325 MG tab Take 1 Tablet by mouth every 12 hours as needed for Severe Pain for up to 30 days. 6/8/22-7/8/22 60 Tablet 0   • ferrous sulfate 325 (65 Fe) MG tablet Take 1 Tablet by mouth 2 times a day. 180 Tablet 2   • ascorbic acid (VITAMIN C) 100 MG tablet      • vitamin D3, cholecalciferol, 400 UNIT Tab tablet Take 10 mcg by mouth every day.     • B Complex-Folic Acid (B COMPLEX-VITAMIN B12 PO) Take 1 Tablet by mouth.     • citalopram (CELEXA) 20 MG Tab Take 1 Tablet by mouth every day. 90 Tablet 3   • pravastatin (PRAVACHOL) 20 MG Tab Take 1 Tablet by mouth at bedtime. 90 Tablet 3   • polyethylene glycol 3350 (MIRALAX) 17 GM/SCOOP Powder Take 17 g by mouth every day. (Patient not taking: Reported on 5/25/2022)       No current [facility]-ordered facility-administered medications on file.     Health Maintenance: Deferred to next visit.    Review of Systems   Constitutional: Negative for chills and fever.   HENT: Negative for sore throat.    Respiratory: Negative.    Cardiovascular: Negative for chest pain.   Gastrointestinal: Negative.    Musculoskeletal: Positive for joint pain.   Neurological: Negative.    Psychiatric/Behavioral: Negative.          Objective:     Exam: /60   Pulse 85   Temp 36.8 °C (98.2 °F) (Temporal)   SpO2 91%  There is no height or weight on file to calculate BMI.    Physical Exam  Vitals reviewed: Patient is using a 3 wheeled mobility scooter.   Constitutional:       General: She is not in acute distress.     Appearance: She is obese. She is not toxic-appearing.   HENT:      Head: Normocephalic and atraumatic.      Nose: Nose normal. No congestion.       Mouth/Throat:      Pharynx: Oropharynx is clear. No oropharyngeal exudate.   Eyes:      General: No scleral icterus.  Cardiovascular:      Rate and Rhythm: Normal rate and regular rhythm.      Pulses: Normal pulses.      Heart sounds: Normal heart sounds.   Pulmonary:      Effort: Pulmonary effort is normal. No respiratory distress.      Breath sounds: Normal breath sounds. No wheezing or rales.   Musculoskeletal:      Right lower leg: No edema.      Left lower leg: No edema.   Neurological:      Mental Status: She is alert and oriented to person, place, and time.   Psychiatric:         Mood and Affect: Mood normal.         Behavior: Behavior normal.         Thought Content: Thought content normal.         Judgment: Judgment normal.       Labs: I have reviewed CMP from 3/23/2022, A1c from 12/10/2021    Assessment & Plan:   82 y.o. female with the following -    Problem List Items Addressed This Visit     Bilateral chronic knee pain     This is a chronic problem, constant pain in bilateral knees.   Patient is currently taking Norco 7.5-325 every 12 hours as needed.  She was able to wean herself off to 2 tablets/day.  PDMP reviewed today, no aberrant behavior.  Patient is aware that she needs every 6 months in person appointment for controlled substance refill.  Controlled substance agreement on file, last pain screening in October 2021.          Carcinoid tumor of appendix            Essential hypertension     Suboptimally controlled, currently on losartan 100 mg tablet daily.  Add amlodipine 5 mg tablet daily.  Counseled on potential side effects.  Patient will continue monitoring her blood pressure at home daily, she will make early appointment if needed.         Relevant Medications    amLODIPine (NORVASC) 5 MG Tab    Mild depression (HCC)     Chronic, stable, continue citalopram 20 mg daily.  No suicidal thoughts or ideations.               Return in about 3 months (around 8/25/2022), or if symptoms worsen or  fail to improve.    Please note that this dictation was created using voice recognition software. I have made every reasonable attempt to correct obvious errors, but I expect that there are errors of grammar and possibly content that I did not discover before finalizing the note.

## 2022-05-25 NOTE — ASSESSMENT & PLAN NOTE
This is a chronic problem, constant pain in bilateral knees.   Patient is currently taking Norco 7.5-325 every 12 hours as needed.  She was able to wean herself off to 2 tablets/day.  PDMP reviewed today, no aberrant behavior.  Patient is aware that she needs every 6 months in person appointment for controlled substance refill.  Controlled substance agreement on file, last pain screening in October 2021.

## 2022-05-25 NOTE — ASSESSMENT & PLAN NOTE
Suboptimally controlled, currently on losartan 100 mg tablet daily.  Add amlodipine 5 mg tablet daily.  Counseled on potential side effects.  Patient will continue monitoring her blood pressure at home daily, she will make early appointment if needed.

## 2022-07-05 ENCOUNTER — TELEPHONE (OUTPATIENT)
Dept: MEDICAL GROUP | Facility: MEDICAL CENTER | Age: 82
End: 2022-07-05
Payer: MEDICARE

## 2022-07-05 NOTE — TELEPHONE ENCOUNTER
1. Name: Olive Alexandra      Call Back Number: [phone] (home)         How would the patient prefer to be contacted with a response: Phone call OK to leave a detailed message    Pt Olive called in [place] saying she has a question if someone can call her back. no other info left.     Thank you

## 2022-07-07 ENCOUNTER — TELEPHONE (OUTPATIENT)
Dept: MEDICAL GROUP | Facility: MEDICAL CENTER | Age: 82
End: 2022-07-07
Payer: MEDICARE

## 2022-07-07 NOTE — TELEPHONE ENCOUNTER
Phone Number Called: [phone] (home)     Call outcome: Spoke to patient regarding message below.    Message: spoke to pt see encounter 7/7

## 2022-07-07 NOTE — TELEPHONE ENCOUNTER
Phone Number Called: [phone] (home)     Call outcome: Did not leave a detailed message. Requested patient to call back.    Message: attempted to return call lvm requesting call back in regard to question

## 2022-07-07 NOTE — TELEPHONE ENCOUNTER
Spoke to pt about HYDROcodone-acetaminophen (NORCO) 7.5-325 MG tab prescription sent in for   7/9/2022 8/8/2022     Recommended she contact her pharmacy and call back with any questions

## 2022-08-03 ENCOUNTER — HOSPITAL ENCOUNTER (OUTPATIENT)
Dept: LAB | Facility: MEDICAL CENTER | Age: 82
End: 2022-08-03
Attending: NURSE PRACTITIONER
Payer: MEDICARE

## 2022-08-03 LAB
ANION GAP SERPL CALC-SCNC: 9 MMOL/L (ref 7–16)
BUN SERPL-MCNC: 14 MG/DL (ref 8–22)
CALCIUM SERPL-MCNC: 9.7 MG/DL (ref 8.5–10.5)
CANCER AG125 SERPL-ACNC: 205 U/ML (ref 0–35)
CHLORIDE SERPL-SCNC: 102 MMOL/L (ref 96–112)
CO2 SERPL-SCNC: 28 MMOL/L (ref 20–33)
CREAT SERPL-MCNC: 0.52 MG/DL (ref 0.5–1.4)
GFR SERPLBLD CREATININE-BSD FMLA CKD-EPI: 93 ML/MIN/1.73 M 2
GLUCOSE SERPL-MCNC: 107 MG/DL (ref 65–99)
POTASSIUM SERPL-SCNC: 4.8 MMOL/L (ref 3.6–5.5)
SODIUM SERPL-SCNC: 139 MMOL/L (ref 135–145)

## 2022-08-03 PROCEDURE — 80048 BASIC METABOLIC PNL TOTAL CA: CPT

## 2022-08-03 PROCEDURE — 36415 COLL VENOUS BLD VENIPUNCTURE: CPT

## 2022-08-03 PROCEDURE — 86304 IMMUNOASSAY TUMOR CA 125: CPT

## 2022-08-08 ENCOUNTER — HOSPITAL ENCOUNTER (OUTPATIENT)
Dept: RADIOLOGY | Facility: MEDICAL CENTER | Age: 82
End: 2022-08-08
Attending: SPECIALIST
Payer: MEDICARE

## 2022-08-08 DIAGNOSIS — C54.1 MALIGNANT NEOPLASM OF ENDOMETRIUM (HCC): ICD-10-CM

## 2022-08-08 PROCEDURE — 74176 CT ABD & PELVIS W/O CONTRAST: CPT | Mod: MH

## 2022-08-09 DIAGNOSIS — C56.9 MALIGNANT NEOPLASM OF OVARY, UNSPECIFIED LATERALITY (HCC): ICD-10-CM

## 2022-08-25 ENCOUNTER — OFFICE VISIT (OUTPATIENT)
Dept: MEDICAL GROUP | Facility: MEDICAL CENTER | Age: 82
End: 2022-08-25
Payer: MEDICARE

## 2022-08-25 VITALS
DIASTOLIC BLOOD PRESSURE: 56 MMHG | HEART RATE: 83 BPM | OXYGEN SATURATION: 91 % | SYSTOLIC BLOOD PRESSURE: 118 MMHG | TEMPERATURE: 98.5 F

## 2022-08-25 DIAGNOSIS — Z79.891 CHRONIC USE OF OPIATE FOR THERAPEUTIC PURPOSE: ICD-10-CM

## 2022-08-25 DIAGNOSIS — K42.9 UMBILICAL HERNIA WITHOUT OBSTRUCTION AND WITHOUT GANGRENE: ICD-10-CM

## 2022-08-25 DIAGNOSIS — M25.561 BILATERAL CHRONIC KNEE PAIN: ICD-10-CM

## 2022-08-25 DIAGNOSIS — G89.29 BILATERAL CHRONIC KNEE PAIN: ICD-10-CM

## 2022-08-25 DIAGNOSIS — M25.562 BILATERAL CHRONIC KNEE PAIN: ICD-10-CM

## 2022-08-25 DIAGNOSIS — R09.81 NASAL CONGESTION: ICD-10-CM

## 2022-08-25 DIAGNOSIS — F32.A MILD DEPRESSION: ICD-10-CM

## 2022-08-25 DIAGNOSIS — I10 ESSENTIAL HYPERTENSION: ICD-10-CM

## 2022-08-25 DIAGNOSIS — E78.2 MIXED HYPERLIPIDEMIA: ICD-10-CM

## 2022-08-25 PROCEDURE — 99214 OFFICE O/P EST MOD 30 MIN: CPT | Performed by: INTERNAL MEDICINE

## 2022-08-25 RX ORDER — AMLODIPINE BESYLATE 5 MG/1
5 TABLET ORAL DAILY
Qty: 90 TABLET | Refills: 1 | Status: ON HOLD
Start: 2022-08-25 | End: 2022-09-05

## 2022-08-25 RX ORDER — CITALOPRAM 20 MG/1
20 TABLET ORAL DAILY
Qty: 90 TABLET | Refills: 3 | Status: SHIPPED | OUTPATIENT
Start: 2022-08-25 | End: 2022-10-03 | Stop reason: SDUPTHER

## 2022-08-25 RX ORDER — HYDROCODONE BITARTRATE AND ACETAMINOPHEN 7.5; 325 MG/1; MG/1
1 TABLET ORAL EVERY 12 HOURS PRN
Qty: 60 TABLET | Refills: 0 | Status: ON HOLD
Start: 2022-10-10 | End: 2022-09-05

## 2022-08-25 RX ORDER — AZELASTINE 1 MG/ML
1 SPRAY, METERED NASAL 2 TIMES DAILY
Qty: 30 ML | Refills: 1 | Status: SHIPPED | OUTPATIENT
Start: 2022-08-25 | End: 2023-01-01 | Stop reason: SDUPTHER

## 2022-08-25 RX ORDER — HYDROCODONE BITARTRATE AND ACETAMINOPHEN 7.5; 325 MG/1; MG/1
1 TABLET ORAL EVERY 12 HOURS PRN
Qty: 60 TABLET | Refills: 0 | Status: ON HOLD
Start: 2022-11-10 | End: 2022-09-05

## 2022-08-25 RX ORDER — PRAVASTATIN SODIUM 20 MG
20 TABLET ORAL
Qty: 90 TABLET | Refills: 3 | Status: SHIPPED | OUTPATIENT
Start: 2022-08-25 | End: 2022-09-29 | Stop reason: SDUPTHER

## 2022-08-25 RX ORDER — LOSARTAN POTASSIUM 100 MG/1
100 TABLET ORAL DAILY
Qty: 90 TABLET | Refills: 2 | Status: ON HOLD
Start: 2022-08-25 | End: 2022-09-05

## 2022-08-25 RX ORDER — HYDROCODONE BITARTRATE AND ACETAMINOPHEN 7.5; 325 MG/1; MG/1
1 TABLET ORAL EVERY 12 HOURS PRN
Qty: 60 TABLET | Refills: 0 | Status: ON HOLD
Start: 2022-09-09 | End: 2022-09-05

## 2022-08-25 ASSESSMENT — ENCOUNTER SYMPTOMS
CHILLS: 0
GASTROINTESTINAL NEGATIVE: 1
PSYCHIATRIC NEGATIVE: 1
FEVER: 0
RESPIRATORY NEGATIVE: 1
SORE THROAT: 0
NEUROLOGICAL NEGATIVE: 1

## 2022-08-25 NOTE — PROGRESS NOTES
Subjective:     Chief Complaint   Patient presents with    Follow-Up     Diagnoses of Umbilical hernia without obstruction and without gangrene, Essential hypertension, Bilateral chronic knee pain, Mild depression (HCC), Mixed hyperlipidemia, Nasal congestion, and Chronic use of opiate for therapeutic purpose were pertinent to this visit.    HPI: Olive is a pleasant 82 y.o. female who presents today with her daughter Jen for medication refill and update.     She would like her medications to be transferred over from [facility] to [facility].   Problem   Umbilical Hernia Without Obstruction and Without Gangrene    08/08/2022: CT Abdomen and Pelvis:     There is a ventral and probably umbilical hernia containing loops of small bowel. There is no inflammation or evidence for obstruction.    Abdominal exam, there is a ventral hernia, approximately 7 x 6 cm, reducible, positive cough reflex.  Patient has had hernia repair in the same location back in 2017, she did not tolerate procedure well.  She agrees to referral to general surgeon.     Bilateral Chronic Knee Pain    Chronic knee pain on Norco 7.5-325 Q12H prn  Now she cut down to 60 tablets per month.   Pain is constant: weather makes it worse, patient is not able to ambulate due to multiple joint pain.  She is using a motorized scooter.  She is doing yoga/stretching 2-3 times per week.  PDMP reviewed today, no aberrant behavior, last fill of Norco was on August 11, 2022, 60 tablets, 30-day supply.  Patient still has 1 prescription for 30-day supply to take from August 9th, 2022 till September 8th, 2022.  Controlled substance agreement on file, urine drug screen obtained in October 2021.     Essential Hypertension    It is finally better controlled, now on losartan 100 mg and amlodipine 5 mg tablet daily.         Past Medical History:   Diagnosis Date    Anemia     COVID-19     Depression     Hyperlipidemia     Hypertension     Obesity     Prediabetes     Uterine  cancer (HCC)      Current Outpatient Medications Ordered in Epic   Medication Sig Dispense Refill    amLODIPine (NORVASC) 5 MG Tab Take 1 Tablet by mouth every day. 90 Tablet 1    citalopram (CELEXA) 20 MG Tab Take 1 Tablet by mouth every day. 90 Tablet 3    losartan (COZAAR) 100 MG Tab Take 1 Tablet by mouth every day. 90 Tablet 2    pravastatin (PRAVACHOL) 20 MG Tab Take 1 Tablet by mouth at bedtime. 90 Tablet 3    azelastine (ASTELIN) 137 MCG/SPRAY nasal spray Administer 1 Spray into affected nostril(S) 2 times a day. 30 mL 1    [START ON 11/10/2022] HYDROcodone-acetaminophen (NORCO) 7.5-325 MG tab Take 1 Tablet by mouth every 12 hours as needed for Moderate Pain or Severe Pain for up to 30 days. 60 Tablet 0    [START ON 10/10/2022] HYDROcodone-acetaminophen (NORCO) 7.5-325 MG tab Take 1 Tablet by mouth every 12 hours as needed for Moderate Pain or Severe Pain for up to 30 days. 60 Tablet 0    [START ON 9/9/2022] HYDROcodone-acetaminophen (NORCO) 7.5-325 MG tab Take 1 Tablet by mouth every 12 hours as needed for Moderate Pain or Severe Pain for up to 30 days. 60 Tablet 0    HYDROcodone-acetaminophen (NORCO) 7.5-325 MG tab Take 1 Tablet by mouth every 12 hours as needed for Moderate Pain or Severe Pain for up to 30 days. From August 9, 2022 till September 8, 2022 60 Tablet 0    Multiple Vitamins-Minerals (PRESERVISION AREDS 2) Cap       ursodiol (ACTIGALL) 300 MG Cap Take 300 mg by mouth 2 times a day.      ferrous sulfate 325 (65 Fe) MG tablet Take 1 Tablet by mouth 2 times a day. 180 Tablet 2    ascorbic acid (VITAMIN C) 100 MG tablet       vitamin D3, cholecalciferol, 400 UNIT Tab tablet Take 10 mcg by mouth every day.      B Complex-Folic Acid (B COMPLEX-VITAMIN B12 PO) Take 1 Tablet by mouth.       No current Epic-ordered facility-administered medications on file.     Review of Systems   Constitutional:  Negative for chills and fever.   HENT:  Negative for sore throat.    Respiratory: Negative.      Cardiovascular:  Negative for chest pain.   Gastrointestinal: Negative.    Musculoskeletal:  Positive for joint pain.   Neurological: Negative.    Psychiatric/Behavioral: Negative.       Objective:     Exam:  /56 (BP Location: Left arm, Patient Position: Sitting, BP Cuff Size: Adult)   Pulse 83   Temp 36.9 °C (98.5 °F) (Temporal)   SpO2 91%  There is no height or weight on file to calculate BMI.    Physical Exam  Vitals reviewed: Patient is using a 3 wheeled mobility scooter.   Constitutional:       General: She is not in acute distress.     Appearance: She is obese. She is not toxic-appearing.   HENT:      Head: Normocephalic and atraumatic.      Nose: Nose normal. No congestion.      Mouth/Throat:      Pharynx: Oropharynx is clear. No oropharyngeal exudate.   Eyes:      General: No scleral icterus.  Cardiovascular:      Rate and Rhythm: Normal rate and regular rhythm.      Pulses: Normal pulses.      Heart sounds: Normal heart sounds.   Pulmonary:      Effort: Pulmonary effort is normal. No respiratory distress.      Breath sounds: Normal breath sounds. No wheezing or rales.   Abdominal:      Tenderness: There is no abdominal tenderness.      Hernia: A hernia is present. Hernia is present in the umbilical area.   Musculoskeletal:      Right lower leg: No edema.      Left lower leg: No edema.   Neurological:      Mental Status: She is alert and oriented to person, place, and time.   Psychiatric:         Mood and Affect: Mood normal.         Behavior: Behavior normal.     Assessment & Plan:   Olive  is a pleasant 82 y.o. female with the following -     Problem List Items Addressed This Visit       Bilateral chronic knee pain (Chronic)     This is a chronic problem, constant moderate to severe pain in bilateral knee joints.  Patient is taking Norco 7.5-325 mg every 12 hours as needed.  Controlled substance agreement on file, every 3 months appointment for medication refills.  Urine drug screen due in  October 2022         Relevant Medications    citalopram (CELEXA) 20 MG Tab    HYDROcodone-acetaminophen (NORCO) 7.5-325 MG tab (Start on 11/10/2022)    HYDROcodone-acetaminophen (NORCO) 7.5-325 MG tab (Start on 10/10/2022)    HYDROcodone-acetaminophen (NORCO) 7.5-325 MG tab (Start on 9/9/2022)    Essential hypertension (Chronic)     Chronic, well controlled on current regimen of losartan and amlodipine 5 mg tablet daily.         Relevant Medications    amLODIPine (NORVASC) 5 MG Tab    losartan (COZAAR) 100 MG Tab    pravastatin (PRAVACHOL) 20 MG Tab    Chronic use of opiate for therapeutic purpose    Relevant Medications    HYDROcodone-acetaminophen (NORCO) 7.5-325 MG tab (Start on 11/10/2022)    HYDROcodone-acetaminophen (NORCO) 7.5-325 MG tab (Start on 10/10/2022)    HYDROcodone-acetaminophen (NORCO) 7.5-325 MG tab (Start on 9/9/2022)    Hyperlipidemia    Relevant Medications    amLODIPine (NORVASC) 5 MG Tab    losartan (COZAAR) 100 MG Tab    pravastatin (PRAVACHOL) 20 MG Tab    Mild depression (HCC)    Relevant Medications    citalopram (CELEXA) 20 MG Tab    Umbilical hernia without obstruction and without gangrene     Umbilical hernia without obstruction, reducible on abdominal exam, approximately 7 x 6 cm.  Patient is agreeable to referral to general surgeon for evaluation.         Relevant Orders    Referral to General Surgery     Other Visit Diagnoses       Nasal congestion        Relevant Medications    azelastine (ASTELIN) 137 MCG/SPRAY nasal spray          Return in about 3 months (around 11/25/2022), or if symptoms worsen or fail to improve.    Please note that this dictation was created using voice recognition software. I have made every reasonable attempt to correct obvious errors, but I expect that there are errors of grammar and possibly content that I did not discover before finalizing the note.

## 2022-08-25 NOTE — ASSESSMENT & PLAN NOTE
Umbilical hernia without obstruction, reducible on abdominal exam, approximately 7 x 6 cm.  Patient is agreeable to referral to general surgeon for evaluation.

## 2022-09-04 ENCOUNTER — HOSPITAL ENCOUNTER (INPATIENT)
Facility: MEDICAL CENTER | Age: 82
LOS: 2 days | DRG: 177 | End: 2022-09-06
Attending: EMERGENCY MEDICINE | Admitting: STUDENT IN AN ORGANIZED HEALTH CARE EDUCATION/TRAINING PROGRAM
Payer: MEDICARE

## 2022-09-04 ENCOUNTER — APPOINTMENT (OUTPATIENT)
Dept: RADIOLOGY | Facility: MEDICAL CENTER | Age: 82
DRG: 177 | End: 2022-09-04
Attending: EMERGENCY MEDICINE
Payer: MEDICARE

## 2022-09-04 DIAGNOSIS — J96.01 ACUTE HYPOXEMIC RESPIRATORY FAILURE DUE TO COVID-19 (HCC): ICD-10-CM

## 2022-09-04 DIAGNOSIS — U07.1 ACUTE HYPOXEMIC RESPIRATORY FAILURE DUE TO COVID-19 (HCC): ICD-10-CM

## 2022-09-04 DIAGNOSIS — Q20.5 LEFT VENTRICULAR TO RIGHT ATRIAL SHUNT: ICD-10-CM

## 2022-09-04 DIAGNOSIS — I27.20 SEVERE PULMONARY HYPERTENSION (HCC): ICD-10-CM

## 2022-09-04 PROBLEM — J12.82 PNEUMONIA DUE TO COVID-19 VIRUS: Status: ACTIVE | Noted: 2022-09-04

## 2022-09-04 LAB
ALBUMIN SERPL BCP-MCNC: 3.6 G/DL (ref 3.2–4.9)
ALBUMIN/GLOB SERPL: 1.2 G/DL
ALP SERPL-CCNC: 103 U/L (ref 30–99)
ALT SERPL-CCNC: 11 U/L (ref 2–50)
ANION GAP SERPL CALC-SCNC: 9 MMOL/L (ref 7–16)
AST SERPL-CCNC: 12 U/L (ref 12–45)
BASOPHILS # BLD AUTO: 0.6 % (ref 0–1.8)
BASOPHILS # BLD: 0.06 K/UL (ref 0–0.12)
BILIRUB SERPL-MCNC: 0.5 MG/DL (ref 0.1–1.5)
BUN SERPL-MCNC: 17 MG/DL (ref 8–22)
CALCIUM SERPL-MCNC: 9.3 MG/DL (ref 8.5–10.5)
CHLORIDE SERPL-SCNC: 96 MMOL/L (ref 96–112)
CO2 SERPL-SCNC: 26 MMOL/L (ref 20–33)
CREAT SERPL-MCNC: 0.6 MG/DL (ref 0.5–1.4)
CRP SERPL HS-MCNC: 1.6 MG/DL (ref 0–0.75)
D DIMER PPP IA.FEU-MCNC: 3.04 UG/ML (FEU) (ref 0–0.5)
EKG IMPRESSION: NORMAL
EOSINOPHIL # BLD AUTO: 0.01 K/UL (ref 0–0.51)
EOSINOPHIL NFR BLD: 0.1 % (ref 0–6.9)
ERYTHROCYTE [DISTWIDTH] IN BLOOD BY AUTOMATED COUNT: 58 FL (ref 35.9–50)
FLUAV RNA SPEC QL NAA+PROBE: NEGATIVE
FLUBV RNA SPEC QL NAA+PROBE: NEGATIVE
GFR SERPLBLD CREATININE-BSD FMLA CKD-EPI: 90 ML/MIN/1.73 M 2
GLOBULIN SER CALC-MCNC: 2.9 G/DL (ref 1.9–3.5)
GLUCOSE SERPL-MCNC: 126 MG/DL (ref 65–99)
HCT VFR BLD AUTO: 47 % (ref 37–47)
HGB BLD-MCNC: 14.7 G/DL (ref 12–16)
IMM GRANULOCYTES # BLD AUTO: 0.18 K/UL (ref 0–0.11)
IMM GRANULOCYTES NFR BLD AUTO: 1.8 % (ref 0–0.9)
LACTATE SERPL-SCNC: 1.1 MMOL/L (ref 0.5–2)
LYMPHOCYTES # BLD AUTO: 0.58 K/UL (ref 1–4.8)
LYMPHOCYTES NFR BLD: 5.9 % (ref 22–41)
MAGNESIUM SERPL-MCNC: 2 MG/DL (ref 1.5–2.5)
MCH RBC QN AUTO: 29.8 PG (ref 27–33)
MCHC RBC AUTO-ENTMCNC: 31.3 G/DL (ref 33.6–35)
MCV RBC AUTO: 95.1 FL (ref 81.4–97.8)
MONOCYTES # BLD AUTO: 0.9 K/UL (ref 0–0.85)
MONOCYTES NFR BLD AUTO: 9.2 % (ref 0–13.4)
NEUTROPHILS # BLD AUTO: 8.1 K/UL (ref 2–7.15)
NEUTROPHILS NFR BLD: 82.4 % (ref 44–72)
NRBC # BLD AUTO: 0.06 K/UL
NRBC BLD-RTO: 0.6 /100 WBC
NT-PROBNP SERPL IA-MCNC: 3953 PG/ML (ref 0–125)
PLATELET # BLD AUTO: 279 K/UL (ref 164–446)
PMV BLD AUTO: 9 FL (ref 9–12.9)
POTASSIUM SERPL-SCNC: 5 MMOL/L (ref 3.6–5.5)
PROCALCITONIN SERPL-MCNC: 0.13 NG/ML
PROT SERPL-MCNC: 6.5 G/DL (ref 6–8.2)
RBC # BLD AUTO: 4.94 M/UL (ref 4.2–5.4)
RSV RNA SPEC QL NAA+PROBE: NEGATIVE
SARS-COV-2 RNA RESP QL NAA+PROBE: DETECTED
SODIUM SERPL-SCNC: 131 MMOL/L (ref 135–145)
SPECIMEN SOURCE: ABNORMAL
TROPONIN T SERPL-MCNC: 27 NG/L (ref 6–19)
WBC # BLD AUTO: 9.8 K/UL (ref 4.8–10.8)

## 2022-09-04 PROCEDURE — 99223 1ST HOSP IP/OBS HIGH 75: CPT | Mod: AI | Performed by: STUDENT IN AN ORGANIZED HEALTH CARE EDUCATION/TRAINING PROGRAM

## 2022-09-04 PROCEDURE — A9270 NON-COVERED ITEM OR SERVICE: HCPCS | Performed by: STUDENT IN AN ORGANIZED HEALTH CARE EDUCATION/TRAINING PROGRAM

## 2022-09-04 PROCEDURE — 96374 THER/PROPH/DIAG INJ IV PUSH: CPT

## 2022-09-04 PROCEDURE — 80053 COMPREHEN METABOLIC PANEL: CPT

## 2022-09-04 PROCEDURE — 8E0ZXY6 ISOLATION: ICD-10-PCS | Performed by: STUDENT IN AN ORGANIZED HEALTH CARE EDUCATION/TRAINING PROGRAM

## 2022-09-04 PROCEDURE — 86140 C-REACTIVE PROTEIN: CPT

## 2022-09-04 PROCEDURE — 83605 ASSAY OF LACTIC ACID: CPT

## 2022-09-04 PROCEDURE — 85379 FIBRIN DEGRADATION QUANT: CPT

## 2022-09-04 PROCEDURE — 83880 ASSAY OF NATRIURETIC PEPTIDE: CPT

## 2022-09-04 PROCEDURE — 700102 HCHG RX REV CODE 250 W/ 637 OVERRIDE(OP): Performed by: STUDENT IN AN ORGANIZED HEALTH CARE EDUCATION/TRAINING PROGRAM

## 2022-09-04 PROCEDURE — 700111 HCHG RX REV CODE 636 W/ 250 OVERRIDE (IP): Performed by: EMERGENCY MEDICINE

## 2022-09-04 PROCEDURE — 87040 BLOOD CULTURE FOR BACTERIA: CPT

## 2022-09-04 PROCEDURE — 93005 ELECTROCARDIOGRAM TRACING: CPT | Performed by: EMERGENCY MEDICINE

## 2022-09-04 PROCEDURE — 99285 EMERGENCY DEPT VISIT HI MDM: CPT

## 2022-09-04 PROCEDURE — C9803 HOPD COVID-19 SPEC COLLECT: HCPCS | Performed by: EMERGENCY MEDICINE

## 2022-09-04 PROCEDURE — 96375 TX/PRO/DX INJ NEW DRUG ADDON: CPT

## 2022-09-04 PROCEDURE — 770001 HCHG ROOM/CARE - MED/SURG/GYN PRIV*

## 2022-09-04 PROCEDURE — 84484 ASSAY OF TROPONIN QUANT: CPT

## 2022-09-04 PROCEDURE — 0241U HCHG SARS-COV-2 COVID-19 NFCT DS RESP RNA 4 TRGT MIC: CPT

## 2022-09-04 PROCEDURE — 36415 COLL VENOUS BLD VENIPUNCTURE: CPT

## 2022-09-04 PROCEDURE — 84145 PROCALCITONIN (PCT): CPT

## 2022-09-04 PROCEDURE — 83735 ASSAY OF MAGNESIUM: CPT

## 2022-09-04 PROCEDURE — 93005 ELECTROCARDIOGRAM TRACING: CPT

## 2022-09-04 PROCEDURE — 85025 COMPLETE CBC W/AUTO DIFF WBC: CPT

## 2022-09-04 PROCEDURE — 700111 HCHG RX REV CODE 636 W/ 250 OVERRIDE (IP): Performed by: STUDENT IN AN ORGANIZED HEALTH CARE EDUCATION/TRAINING PROGRAM

## 2022-09-04 PROCEDURE — 71045 X-RAY EXAM CHEST 1 VIEW: CPT

## 2022-09-04 RX ORDER — DEXAMETHASONE 6 MG/1
6 TABLET ORAL DAILY
Status: DISCONTINUED | OUTPATIENT
Start: 2022-09-05 | End: 2022-09-06 | Stop reason: HOSPADM

## 2022-09-04 RX ORDER — ENOXAPARIN SODIUM 100 MG/ML
40 INJECTION SUBCUTANEOUS DAILY
Status: DISCONTINUED | OUTPATIENT
Start: 2022-09-04 | End: 2022-09-05

## 2022-09-04 RX ORDER — ACETAMINOPHEN 325 MG/1
650 TABLET ORAL EVERY 6 HOURS PRN
Status: DISCONTINUED | OUTPATIENT
Start: 2022-09-04 | End: 2022-09-06 | Stop reason: HOSPADM

## 2022-09-04 RX ORDER — DEXAMETHASONE SODIUM PHOSPHATE 4 MG/ML
6 INJECTION, SOLUTION INTRA-ARTICULAR; INTRALESIONAL; INTRAMUSCULAR; INTRAVENOUS; SOFT TISSUE ONCE
Status: COMPLETED | OUTPATIENT
Start: 2022-09-04 | End: 2022-09-04

## 2022-09-04 RX ORDER — AMLODIPINE BESYLATE 5 MG/1
5 TABLET ORAL DAILY
Status: DISCONTINUED | OUTPATIENT
Start: 2022-09-04 | End: 2022-09-06 | Stop reason: HOSPADM

## 2022-09-04 RX ORDER — FUROSEMIDE 10 MG/ML
40 INJECTION INTRAMUSCULAR; INTRAVENOUS
Status: DISCONTINUED | OUTPATIENT
Start: 2022-09-05 | End: 2022-09-05

## 2022-09-04 RX ORDER — PRAVASTATIN SODIUM 20 MG
20 TABLET ORAL
Status: DISCONTINUED | OUTPATIENT
Start: 2022-09-04 | End: 2022-09-06 | Stop reason: HOSPADM

## 2022-09-04 RX ORDER — ONDANSETRON 2 MG/ML
4 INJECTION INTRAMUSCULAR; INTRAVENOUS EVERY 6 HOURS PRN
Status: DISCONTINUED | OUTPATIENT
Start: 2022-09-04 | End: 2022-09-06 | Stop reason: HOSPADM

## 2022-09-04 RX ORDER — ONDANSETRON 4 MG/1
4 TABLET, ORALLY DISINTEGRATING ORAL EVERY 6 HOURS PRN
Status: DISCONTINUED | OUTPATIENT
Start: 2022-09-04 | End: 2022-09-06 | Stop reason: HOSPADM

## 2022-09-04 RX ORDER — LOSARTAN POTASSIUM 50 MG/1
100 TABLET ORAL DAILY
Status: DISCONTINUED | OUTPATIENT
Start: 2022-09-05 | End: 2022-09-06 | Stop reason: HOSPADM

## 2022-09-04 RX ORDER — FUROSEMIDE 10 MG/ML
40 INJECTION INTRAMUSCULAR; INTRAVENOUS ONCE
Status: COMPLETED | OUTPATIENT
Start: 2022-09-04 | End: 2022-09-04

## 2022-09-04 RX ORDER — CITALOPRAM 20 MG/1
20 TABLET ORAL DAILY
Status: DISCONTINUED | OUTPATIENT
Start: 2022-09-05 | End: 2022-09-06 | Stop reason: HOSPADM

## 2022-09-04 RX ORDER — AMLODIPINE BESYLATE 5 MG/1
5 TABLET ORAL DAILY
Status: DISCONTINUED | OUTPATIENT
Start: 2022-09-05 | End: 2022-09-04

## 2022-09-04 RX ADMIN — FUROSEMIDE 40 MG: 10 INJECTION, SOLUTION INTRAMUSCULAR; INTRAVENOUS at 18:41

## 2022-09-04 RX ADMIN — ENOXAPARIN SODIUM 40 MG: 100 INJECTION SUBCUTANEOUS at 20:37

## 2022-09-04 RX ADMIN — PRAVASTATIN SODIUM 20 MG: 20 TABLET ORAL at 20:38

## 2022-09-04 RX ADMIN — AMLODIPINE BESYLATE 5 MG: 5 TABLET ORAL at 20:39

## 2022-09-04 RX ADMIN — DEXAMETHASONE SODIUM PHOSPHATE 6 MG: 4 INJECTION, SOLUTION INTRA-ARTICULAR; INTRALESIONAL; INTRAMUSCULAR; INTRAVENOUS; SOFT TISSUE at 17:34

## 2022-09-04 ASSESSMENT — LIFESTYLE VARIABLES
DOES PATIENT WANT TO STOP DRINKING: NO
HAVE YOU EVER FELT YOU SHOULD CUT DOWN ON YOUR DRINKING: NO
TOTAL SCORE: 0
CONSUMPTION TOTAL: NEGATIVE
HAVE PEOPLE ANNOYED YOU BY CRITICIZING YOUR DRINKING: NO
EVER FELT BAD OR GUILTY ABOUT YOUR DRINKING: NO
ON A TYPICAL DAY WHEN YOU DRINK ALCOHOL HOW MANY DRINKS DO YOU HAVE: 0
TOTAL SCORE: 0
ALCOHOL_USE: YES
AVERAGE NUMBER OF DAYS PER WEEK YOU HAVE A DRINK CONTAINING ALCOHOL: 0
HOW MANY TIMES IN THE PAST YEAR HAVE YOU HAD 5 OR MORE DRINKS IN A DAY: 0
EVER HAD A DRINK FIRST THING IN THE MORNING TO STEADY YOUR NERVES TO GET RID OF A HANGOVER: NO
TOTAL SCORE: 0

## 2022-09-04 ASSESSMENT — COGNITIVE AND FUNCTIONAL STATUS - GENERAL
SUGGESTED CMS G CODE MODIFIER MOBILITY: CM
MOVING FROM LYING ON BACK TO SITTING ON SIDE OF FLAT BED: A LOT
SUGGESTED CMS G CODE MODIFIER DAILY ACTIVITY: CK
TURNING FROM BACK TO SIDE WHILE IN FLAT BAD: A LOT
MOVING TO AND FROM BED TO CHAIR: A LOT
CLIMB 3 TO 5 STEPS WITH RAILING: TOTAL
TOILETING: A LITTLE
STANDING UP FROM CHAIR USING ARMS: TOTAL
DRESSING REGULAR UPPER BODY CLOTHING: A LITTLE
MOBILITY SCORE: 9
DAILY ACTIVITIY SCORE: 19
WALKING IN HOSPITAL ROOM: TOTAL
DRESSING REGULAR LOWER BODY CLOTHING: A LOT
HELP NEEDED FOR BATHING: A LITTLE

## 2022-09-04 ASSESSMENT — FIBROSIS 4 INDEX
FIB4 SCORE: 1.65
FIB4 SCORE: 1.05
FIB4 SCORE: 1.05

## 2022-09-04 ASSESSMENT — PATIENT HEALTH QUESTIONNAIRE - PHQ9
SUM OF ALL RESPONSES TO PHQ9 QUESTIONS 1 AND 2: 0
1. LITTLE INTEREST OR PLEASURE IN DOING THINGS: NOT AT ALL
2. FEELING DOWN, DEPRESSED, IRRITABLE, OR HOPELESS: NOT AT ALL

## 2022-09-04 ASSESSMENT — PAIN DESCRIPTION - PAIN TYPE: TYPE: ACUTE PAIN

## 2022-09-04 NOTE — ED NOTES
Pt BIBA from home, c/o SOB that started approx. 2-3 days ago. Pt took home COVID test yesterday and states it was positive. Per EMS on arrival pt was 50% on RA, placed pt on 4L, increased O2 to 96%.     Pt a/o x4, speaking in full sentences.

## 2022-09-04 NOTE — ED PROVIDER NOTES
ED Provider Note    CHIEF COMPLAINT  Chief Complaint   Patient presents with    Coronavirus Screening    Hypoxemia       HPI  Olive Alexandra is a 82 y.o. female who presents for evaluation of cough malaise low-grade fever.  The patient has a complex medical history.  She apparently has had COVID-19 in the past and is quadruple vaccinated.  She started feeling poorly 2 to 3 days ago.   called 911 and she was noted to be 50% on room air with a good waveform she was placed on 4 to 5 L nasal cannula and his oxygen saturations improved.  She does report some chest tightness but no pain.  She does report history of orthopnea and some leg swelling.  She has no history of oxygen dependence    REVIEW OF SYSTEMS  See HPI for further details.  Positive for fever cough shortness of breath all other systems are negative.     PAST MEDICAL HISTORY  Past Medical History:   Diagnosis Date    Anemia     COVID-19     Depression     Hyperlipidemia     Hypertension     Obesity     Prediabetes     Uterine cancer (HCC)        FAMILY HISTORY  Noncontributory    SOCIAL HISTORY  Social History     Socioeconomic History    Marital status:     Highest education level: Bachelor's degree (e.g., BA, AB, BS)   Tobacco Use    Smoking status: Never    Smokeless tobacco: Never   Vaping Use    Vaping Use: Never used   Substance and Sexual Activity    Alcohol use: Yes     Comment: once martini every 3-4 month    Drug use: Never     Social Determinants of Health     Financial Resource Strain: Low Risk     Difficulty of Paying Living Expenses: Not hard at all   Food Insecurity: No Food Insecurity    Worried About Running Out of Food in the Last Year: Never true    Ran Out of Food in the Last Year: Never true   Transportation Needs: No Transportation Needs    Lack of Transportation (Medical): No    Lack of Transportation (Non-Medical): No   Physical Activity: Sufficiently Active    Days of Exercise per Week: 5 days    Minutes of  MD reviewed results, recommends that patient proceed with prostate biopsy.      Placed call to patient to update with results and recommendations.   LVM for call back      Exercise per Session: 50 min   Social Connections: Socially Integrated    Frequency of Communication with Friends and Family: More than three times a week    Frequency of Social Gatherings with Friends and Family: More than three times a week    Attends Yazidi Services: More than 4 times per year    Active Member of Clubs or Organizations: Yes    Attends Club or Organization Meetings: More than 4 times per year    Marital Status:    Housing Stability: Low Risk     Unable to Pay for Housing in the Last Year: No    Number of Places Lived in the Last Year: 2    Unstable Housing in the Last Year: No      patient appears acutely ill  SURGICAL HISTORY  Past Surgical History:   Procedure Laterality Date    ABDOMINAL HYSTERECTOMY TOTAL      1/18/2019    COLON RESECTION      3/13/2019    HERNIA REPAIR      4/29/17    OPEN REDUCTION      right ankle suregery 1/2017. shoulder arthroscopy 8/2017    OTHER      right shoulder arthroscopy 8/2017       CURRENT MEDICATIONS  Home Medications       Reviewed by Melanie Elias R.N. (Registered Nurse) on 09/04/22 at 1543  Med List Status: Partial     Medication Last Dose Status   amLODIPine (NORVASC) 5 MG Tab  Active   ascorbic acid (VITAMIN C) 100 MG tablet  Active   azelastine (ASTELIN) 137 MCG/SPRAY nasal spray  Active   B Complex-Folic Acid (B COMPLEX-VITAMIN B12 PO)  Active   citalopram (CELEXA) 20 MG Tab  Active   ferrous sulfate 325 (65 Fe) MG tablet  Active   HYDROcodone-acetaminophen (NORCO) 7.5-325 MG tab  Active   HYDROcodone-acetaminophen (NORCO) 7.5-325 MG tab  Active   HYDROcodone-acetaminophen (NORCO) 7.5-325 MG tab  Active   HYDROcodone-acetaminophen (NORCO) 7.5-325 MG tab  Active   losartan (COZAAR) 100 MG Tab  Active   Multiple Vitamins-Minerals (PRESERVISION AREDS 2) Cap  Active   pravastatin (PRAVACHOL) 20 MG Tab  Active   ursodiol (ACTIGALL) 300 MG Cap  Active   vitamin D3, cholecalciferol, 400 UNIT Tab tablet  Active                     ALLERGIES  Allergies   Allergen Reactions    Iodine Vomiting and Rash     Has a Shellfish allergy.  Is OK with contrast if given pre-meds first.  Has not tried topical iodine/betadine - doesn't want to chance it  Has a Shellfish allergy.  Is OK with contrast if given pre-meds first.  Has not tried topical iodine/betadine - doesn't want to chance it      Shrimp (Diagnostic)      Vomiting, feeling sick.     Gramineae Pollens Unspecified and Rash     Runny nose      Penicillins Rash     Decades ago  Decades ago as a child         PHYSICAL EXAM  VITAL SIGNS: BP (!) 161/72   Pulse 87   Temp 37.4 °C (99.3 °F) (Temporal)   Resp 18   Wt 125 kg (275 lb)   SpO2 96%   BMI 44.39 kg/m²       Constitutional: Patient appears acutely ill  HENT: Normocephalic, Atraumatic, Bilateral external ears normal, Oropharynx moist, No oral exudates, Nose normal.   Eyes: PERRLA, EOMI, Conjunctiva normal, No discharge.   Neck: Normal range of motion, No tenderness, Supple, No stridor.   Lymphatic: No lymphadenopathy noted.   Cardiovascular: Normal heart rate, Normal rhythm, No murmurs, No rubs, No gallops.   Thorax & Lungs: Bilateral rhonchi increased work of breathing noted  abdomen: Bowel sounds normal, Soft, No tenderness, No masses, No pulsatile masses.   Skin: Warm, Dry, No erythema, No rash.   Back: No tenderness, No CVA tenderness.   Extremities: Intact distal pulses, bilateral 3+ pitting edema, No tenderness, No cyanosis, No clubbing.   Neurologic: Alert & oriented x 3, Normal motor function, Normal sensory function, No focal deficits noted.   Psychiatric: Anxious    DX-CHEST-PORTABLE (1 VIEW)   Final Result      1.  Cardiomegaly and bilateral interstitial opacities, possibly edema. Small bilateral pleural effusions.   2.  No lobar consolidation to suggest pneumonia.        Results for orders placed or performed during the hospital encounter of 09/04/22   CBC With Differential   Result Value Ref Range    WBC 9.8 4.8 - 10.8  K/uL    RBC 4.94 4.20 - 5.40 M/uL    Hemoglobin 14.7 12.0 - 16.0 g/dL    Hematocrit 47.0 37.0 - 47.0 %    MCV 95.1 81.4 - 97.8 fL    MCH 29.8 27.0 - 33.0 pg    MCHC 31.3 (L) 33.6 - 35.0 g/dL    RDW 58.0 (H) 35.9 - 50.0 fL    Platelet Count 279 164 - 446 K/uL    MPV 9.0 9.0 - 12.9 fL    Neutrophils-Polys 82.40 (H) 44.00 - 72.00 %    Lymphocytes 5.90 (L) 22.00 - 41.00 %    Monocytes 9.20 0.00 - 13.40 %    Eosinophils 0.10 0.00 - 6.90 %    Basophils 0.60 0.00 - 1.80 %    Immature Granulocytes 1.80 (H) 0.00 - 0.90 %    Nucleated RBC 0.60 /100 WBC    Neutrophils (Absolute) 8.10 (H) 2.00 - 7.15 K/uL    Lymphs (Absolute) 0.58 (L) 1.00 - 4.80 K/uL    Monos (Absolute) 0.90 (H) 0.00 - 0.85 K/uL    Eos (Absolute) 0.01 0.00 - 0.51 K/uL    Baso (Absolute) 0.06 0.00 - 0.12 K/uL    Immature Granulocytes (abs) 0.18 (H) 0.00 - 0.11 K/uL    NRBC (Absolute) 0.06 K/uL   Comp Metabolic Panel   Result Value Ref Range    Sodium 131 (L) 135 - 145 mmol/L    Potassium 5.0 3.6 - 5.5 mmol/L    Chloride 96 96 - 112 mmol/L    Co2 26 20 - 33 mmol/L    Anion Gap 9.0 7.0 - 16.0    Glucose 126 (H) 65 - 99 mg/dL    Bun 17 8 - 22 mg/dL    Creatinine 0.60 0.50 - 1.40 mg/dL    Calcium 9.3 8.5 - 10.5 mg/dL    AST(SGOT) 12 12 - 45 U/L    ALT(SGPT) 11 2 - 50 U/L    Alkaline Phosphatase 103 (H) 30 - 99 U/L    Total Bilirubin 0.5 0.1 - 1.5 mg/dL    Albumin 3.6 3.2 - 4.9 g/dL    Total Protein 6.5 6.0 - 8.2 g/dL    Globulin 2.9 1.9 - 3.5 g/dL    A-G Ratio 1.2 g/dL   Lactic Acid   Result Value Ref Range    Lactic Acid 1.1 0.5 - 2.0 mmol/L   Troponin   Result Value Ref Range    Troponin T 27 (H) 6 - 19 ng/L   proBrain Natriuretic Peptide, NT   Result Value Ref Range    NT-proBNP 3953 (H) 0 - 125 pg/mL   CoV-2, FLU A/B, and RSV by PCR (2-4 Hours [employer]) : Collect NP swab in VTM    Specimen: Nasopharyngeal; Respirate   Result Value Ref Range    SARS-CoV-2 Source NP Swab    ESTIMATED GFR   Result Value Ref Range    GFR (CKD-EPI) 90 >60 mL/min/1.73 m 2   EKG (NOW)    Result Value Ref Range    Report       [facility]    Test Date:  2022  Pt Name:    JENNI NIXON                 Department: ER  MRN:        [id number]                      Room:        37  Gender:     Female                       Technician: [id number]  :        1940                   Requested By:ER TRIAGE PROTOCOL  Order #:    [id number]                    Reading MD:    Measurements  Intervals                                Axis  Rate:       82                           P:          -7  AR:         211                          QRS:        38  QRSD:       139                          T:          -4  QT:         402  QTc:        470    Interpretive Statements  Sinus rhythm  Right bundle branch block  No previous ECG available for comparison           COURSE & MEDICAL DECISION MAKING  Pertinent Labs & Imaging studies reviewed. (See chart for details)  Septic protocol was initiated per my orders.  We repeat the COVID test but she did test positive at home.  She is stable on 4 L.  Chest x-ray demonstrates pneumonitis pattern with some possible volume overload as well.  BNP is elevated troponin is slightly elevated at 27.  The rest of her laboratory studies are unremarkable.  Her vital signs do not suggest fulminant sepsis.  Patient will be admitted for COVID pneumonitis with volume overload and hypoxia.  First dose of IV Decadron will be administered.  The patient will be admitted to the hospitalist service    FINAL IMPRESSION  1.  COVID pneumonitis with hypoxia  2.  Volume overload         Electronically signed by: Rafael Oliver M.D., 2022 3:57 PM

## 2022-09-05 ENCOUNTER — APPOINTMENT (OUTPATIENT)
Dept: CARDIOLOGY | Facility: MEDICAL CENTER | Age: 82
DRG: 177 | End: 2022-09-05
Attending: GENERAL PRACTICE
Payer: MEDICARE

## 2022-09-05 ENCOUNTER — APPOINTMENT (OUTPATIENT)
Dept: RADIOLOGY | Facility: MEDICAL CENTER | Age: 82
DRG: 177 | End: 2022-09-05
Attending: GENERAL PRACTICE
Payer: MEDICARE

## 2022-09-05 ENCOUNTER — APPOINTMENT (OUTPATIENT)
Dept: RADIOLOGY | Facility: MEDICAL CENTER | Age: 82
DRG: 177 | End: 2022-09-05
Attending: STUDENT IN AN ORGANIZED HEALTH CARE EDUCATION/TRAINING PROGRAM
Payer: MEDICARE

## 2022-09-05 PROBLEM — J96.01 ACUTE HYPOXEMIC RESPIRATORY FAILURE DUE TO COVID-19 (HCC): Status: ACTIVE | Noted: 2022-09-04

## 2022-09-05 PROBLEM — J96.01 ACUTE RESPIRATORY FAILURE WITH HYPOXIA (HCC): Status: ACTIVE | Noted: 2022-09-05

## 2022-09-05 PROBLEM — Q20.5 LEFT VENTRICULAR TO RIGHT ATRIAL SHUNT: Status: ACTIVE | Noted: 2022-09-05

## 2022-09-05 PROBLEM — J81.0 ACUTE PULMONARY EDEMA (HCC): Status: ACTIVE | Noted: 2022-09-05

## 2022-09-05 PROBLEM — I27.20 SEVERE PULMONARY HYPERTENSION (HCC): Status: ACTIVE | Noted: 2022-09-05

## 2022-09-05 LAB
LV EJECT FRACT  99904: 65
TROPONIN T SERPL-MCNC: 23 NG/L (ref 6–19)

## 2022-09-05 PROCEDURE — 97535 SELF CARE MNGMENT TRAINING: CPT

## 2022-09-05 PROCEDURE — 93306 TTE W/DOPPLER COMPLETE: CPT | Mod: 26 | Performed by: INTERNAL MEDICINE

## 2022-09-05 PROCEDURE — 99232 SBSQ HOSP IP/OBS MODERATE 35: CPT | Performed by: GENERAL PRACTICE

## 2022-09-05 PROCEDURE — 700102 HCHG RX REV CODE 250 W/ 637 OVERRIDE(OP): Performed by: STUDENT IN AN ORGANIZED HEALTH CARE EDUCATION/TRAINING PROGRAM

## 2022-09-05 PROCEDURE — 71275 CT ANGIOGRAPHY CHEST: CPT

## 2022-09-05 PROCEDURE — 93306 TTE W/DOPPLER COMPLETE: CPT

## 2022-09-05 PROCEDURE — 700117 HCHG RX CONTRAST REV CODE 255: Performed by: STUDENT IN AN ORGANIZED HEALTH CARE EDUCATION/TRAINING PROGRAM

## 2022-09-05 PROCEDURE — 770001 HCHG ROOM/CARE - MED/SURG/GYN PRIV*

## 2022-09-05 PROCEDURE — 93970 EXTREMITY STUDY: CPT

## 2022-09-05 PROCEDURE — A9270 NON-COVERED ITEM OR SERVICE: HCPCS | Performed by: STUDENT IN AN ORGANIZED HEALTH CARE EDUCATION/TRAINING PROGRAM

## 2022-09-05 PROCEDURE — 700102 HCHG RX REV CODE 250 W/ 637 OVERRIDE(OP): Performed by: GENERAL PRACTICE

## 2022-09-05 PROCEDURE — A9270 NON-COVERED ITEM OR SERVICE: HCPCS | Performed by: GENERAL PRACTICE

## 2022-09-05 PROCEDURE — 700111 HCHG RX REV CODE 636 W/ 250 OVERRIDE (IP): Performed by: GENERAL PRACTICE

## 2022-09-05 PROCEDURE — RXMED WILLOW AMBULATORY MEDICATION CHARGE: Performed by: GENERAL PRACTICE

## 2022-09-05 PROCEDURE — 700111 HCHG RX REV CODE 636 W/ 250 OVERRIDE (IP): Performed by: STUDENT IN AN ORGANIZED HEALTH CARE EDUCATION/TRAINING PROGRAM

## 2022-09-05 PROCEDURE — 84484 ASSAY OF TROPONIN QUANT: CPT

## 2022-09-05 RX ORDER — ENOXAPARIN SODIUM 100 MG/ML
40 INJECTION SUBCUTANEOUS DAILY
Status: DISCONTINUED | OUTPATIENT
Start: 2022-09-06 | End: 2022-09-06 | Stop reason: HOSPADM

## 2022-09-05 RX ORDER — ENOXAPARIN SODIUM 150 MG/ML
1 INJECTION SUBCUTANEOUS EVERY 12 HOURS
Status: DISCONTINUED | OUTPATIENT
Start: 2022-09-05 | End: 2022-09-05

## 2022-09-05 RX ORDER — HYDROCODONE BITARTRATE AND ACETAMINOPHEN 7.5; 325 MG/1; MG/1
1 TABLET ORAL EVERY 12 HOURS PRN
Status: DISCONTINUED | OUTPATIENT
Start: 2022-09-05 | End: 2022-09-05

## 2022-09-05 RX ORDER — FUROSEMIDE 40 MG/1
40 TABLET ORAL
Status: DISCONTINUED | OUTPATIENT
Start: 2022-09-06 | End: 2022-09-05

## 2022-09-05 RX ORDER — FUROSEMIDE 40 MG/1
40 TABLET ORAL DAILY
Qty: 30 TABLET | Refills: 0 | Status: SHIPPED | OUTPATIENT
Start: 2022-09-05 | End: 2022-10-06

## 2022-09-05 RX ORDER — ENOXAPARIN SODIUM 150 MG/ML
110 INJECTION SUBCUTANEOUS ONCE
Status: COMPLETED | OUTPATIENT
Start: 2022-09-05 | End: 2022-09-05

## 2022-09-05 RX ORDER — URSODIOL 300 MG/1
300 CAPSULE ORAL 2 TIMES DAILY
Status: DISCONTINUED | OUTPATIENT
Start: 2022-09-05 | End: 2022-09-06 | Stop reason: HOSPADM

## 2022-09-05 RX ORDER — DEXAMETHASONE 6 MG/1
6 TABLET ORAL DAILY
Qty: 7 TABLET | Refills: 0 | Status: SHIPPED | OUTPATIENT
Start: 2022-09-07 | End: 2022-09-14

## 2022-09-05 RX ORDER — DIPHENHYDRAMINE HCL 25 MG
50 TABLET ORAL ONCE
Status: COMPLETED | OUTPATIENT
Start: 2022-09-05 | End: 2022-09-05

## 2022-09-05 RX ORDER — POTASSIUM CHLORIDE 750 MG/1
10 TABLET, EXTENDED RELEASE ORAL DAILY
Qty: 30 EACH | Refills: 0 | Status: SHIPPED | OUTPATIENT
Start: 2022-09-05 | End: 2022-10-06

## 2022-09-05 RX ORDER — PREDNISONE 50 MG/1
50 TABLET ORAL ONCE
Status: DISCONTINUED | OUTPATIENT
Start: 2022-09-05 | End: 2022-09-05

## 2022-09-05 RX ORDER — FERROUS SULFATE 325(65) MG
325 TABLET ORAL 2 TIMES DAILY
Status: DISCONTINUED | OUTPATIENT
Start: 2022-09-05 | End: 2022-09-06 | Stop reason: HOSPADM

## 2022-09-05 RX ORDER — DIPHENHYDRAMINE HYDROCHLORIDE 50 MG/ML
50 INJECTION INTRAMUSCULAR; INTRAVENOUS ONCE
Status: COMPLETED | OUTPATIENT
Start: 2022-09-05 | End: 2022-09-05

## 2022-09-05 RX ORDER — AZELASTINE 1 MG/ML
1 SPRAY, METERED NASAL 2 TIMES DAILY
Status: DISCONTINUED | OUTPATIENT
Start: 2022-09-05 | End: 2022-09-05

## 2022-09-05 RX ORDER — HYDROCODONE BITARTRATE AND ACETAMINOPHEN 5; 325 MG/1; MG/1
1 TABLET ORAL EVERY 12 HOURS PRN
Status: DISCONTINUED | OUTPATIENT
Start: 2022-09-05 | End: 2022-09-06 | Stop reason: HOSPADM

## 2022-09-05 RX ORDER — FUROSEMIDE 10 MG/ML
40 INJECTION INTRAMUSCULAR; INTRAVENOUS
Status: DISCONTINUED | OUTPATIENT
Start: 2022-09-05 | End: 2022-09-06 | Stop reason: HOSPADM

## 2022-09-05 RX ADMIN — DIPHENHYDRAMINE HYDROCHLORIDE 50 MG: 50 INJECTION INTRAMUSCULAR; INTRAVENOUS at 02:01

## 2022-09-05 RX ADMIN — CITALOPRAM HYDROBROMIDE 20 MG: 20 TABLET ORAL at 06:44

## 2022-09-05 RX ADMIN — ENOXAPARIN SODIUM 109.5 MG: 120 INJECTION SUBCUTANEOUS at 02:09

## 2022-09-05 RX ADMIN — FUROSEMIDE 40 MG: 10 INJECTION, SOLUTION INTRAMUSCULAR; INTRAVENOUS at 06:43

## 2022-09-05 RX ADMIN — FERROUS SULFATE TAB 325 MG (65 MG ELEMENTAL FE) 325 MG: 325 (65 FE) TAB at 12:39

## 2022-09-05 RX ADMIN — FUROSEMIDE 40 MG: 10 INJECTION, SOLUTION INTRAMUSCULAR; INTRAVENOUS at 17:05

## 2022-09-05 RX ADMIN — FERROUS SULFATE TAB 325 MG (65 MG ELEMENTAL FE) 325 MG: 325 (65 FE) TAB at 17:05

## 2022-09-05 RX ADMIN — ACETAMINOPHEN 650 MG: 325 TABLET ORAL at 12:46

## 2022-09-05 RX ADMIN — PRAVASTATIN SODIUM 20 MG: 20 TABLET ORAL at 19:58

## 2022-09-05 RX ADMIN — ENOXAPARIN SODIUM 150 MG: 150 INJECTION SUBCUTANEOUS at 12:39

## 2022-09-05 RX ADMIN — DIPHENHYDRAMINE HYDROCHLORIDE 50 MG: 25 TABLET ORAL at 09:48

## 2022-09-05 RX ADMIN — HYDROCODONE BITARTRATE AND ACETAMINOPHEN 1 TABLET: 5; 325 TABLET ORAL at 18:24

## 2022-09-05 RX ADMIN — URSODIOL 300 MG: 300 CAPSULE ORAL at 19:58

## 2022-09-05 RX ADMIN — IOHEXOL 50 ML: 350 INJECTION, SOLUTION INTRAVENOUS at 12:08

## 2022-09-05 RX ADMIN — DEXAMETHASONE 6 MG: 6 TABLET ORAL at 06:44

## 2022-09-05 ASSESSMENT — ENCOUNTER SYMPTOMS
DIZZINESS: 0
HEADACHES: 0
NAUSEA: 0
NECK PAIN: 0
DOUBLE VISION: 0
PALPITATIONS: 0
HEARTBURN: 0
HEMOPTYSIS: 0
DEPRESSION: 0
COUGH: 0
MYALGIAS: 0
BRUISES/BLEEDS EASILY: 0
CHILLS: 1
BLURRED VISION: 0
FEVER: 1
SHORTNESS OF BREATH: 1

## 2022-09-05 ASSESSMENT — PAIN DESCRIPTION - PAIN TYPE: TYPE: ACUTE PAIN

## 2022-09-05 NOTE — FACE TO FACE
"Face to Face Note  -  Durable Medical Equipment    Jeanine Petty D.O. - NPI: [id number]  I certify that this patient is under my care and that they had a durable medical equipment(DME)face to face encounter by myself that meets the physician DME face-to-face encounter requirements with this patient on:    Date of encounter:   Patient:                    MRN:                       YOB: 2021  Olive Alexandra  [id number]  1940     The encounter with the patient was in whole, or in part, for the following medical condition, which is the primary reason for durable medical equipment:  Covid-19 Infection and Other - Pulmonary Hypertension    I certify that, based on my findings, the following durable medical equipment is medically necessary:    Oxygen   HOME O2 Saturation Measurements:(Values must be present for Home Oxygen orders)  Room air sat at rest: 85  Room air sat with amb: 81  With liters of O2: 2, O2 sat at rest with O2: 95  With Liters of O2: 3, O2 sat with amb with O2 : 93  Is the patient mobile?: No  If patient feels more short of breath, they can go up to 6 liters per minute and contact healthcare provider.    Supporting Symptoms: The patient requires supplemental oxygen, as the following interventions have been tried with limited or no improvement: \"Ambulation with oximetry and \"Incentive spirometry.    My Clinical findings support the need for the above equipment due to:  Hypoxia  "

## 2022-09-05 NOTE — CARE PLAN
The patient is Stable - Low risk of patient condition declining or worsening    Shift Goals  Clinical Goals: monitor O2, comfort, covid care  Patient Goals: comfort, O2 saturation improvement  Family Goals: comfort, safety    Progress made toward(s) clinical / shift goals:    Problem: Knowledge Deficit - Standard  Goal: Patient and family/care givers will demonstrate understanding of plan of care, disease process/condition, diagnostic tests and medications  Outcome: Progressing     Problem: Fall Risk  Goal: Patient will remain free from falls  Outcome: Progressing     Problem: Skin Integrity  Goal: Skin integrity is maintained or improved  Outcome: Progressing

## 2022-09-05 NOTE — PROGRESS NOTES
Pt arrived on floor around 1920. Fall precautions and education done. Educated on use of call light which is placed nearby patient.

## 2022-09-05 NOTE — PROGRESS NOTES
Hospital Medicine Daily Progress Note    Date of Service  9/5/2022    Chief Complaint  Olive Alexandra is a 82 y.o. female admitted 9/4/2022 with shortness of breath    Hospital Course  This is an 82-year-old female with past medical history of hypertension, dyslipidemia, chronic pain syndrome and morbid obesity who was admitted on 09/4/2022 for acute hypoxemic respiratory failure secondary to COVID-19 pneumonia.    Patient is COVID vaccinated x4.  Patient tested positive on 09/03 with a home test, confirmed on PCR on 09/4/2022. No leukocytosis, procalcitonin negative, D-dimer elevated, CTA negative for PE and venous doppler negative for DVT. Patient started on Decadron.  She is currently on 2 L of oxygen.  Home O2 eval to be performed.    ECHO noted normal LVEF, positive bubble study at rest and with Valsalva -significant atrial shunt, severe pulmonary hypertension with pressures of 68 mmHg.  Patient will need to follow-up with cardiology and pulmonology outpatient.    Patient at baseline ambulates with a power scooter, has home health paid caretakers at at her independent living facility.  PT/OT ordered to evaluate if patient is at her baseline and can return to her independent living facility.    Interval Problem Update  Patient is COVID vaccinated x4.  Patient tested positive on 09/03 with a home test, confirmed on PCR on 09/4/2022. No leukocytosis, procalcitonin negative, D-dimer elevated, CTA negative for PE and venous doppler negative for DVT. Patient started on Decadron.  She is currently on 2 L of oxygen.  CT imaging noted bilateral pleural effusions, increased patient's diuretics.     ECHO noted normal LVEF, positive bubble study at rest and with Valsalva -significant atrial shunt, severe pulmonary hypertension with pressures of 68 mmHg.  Patient will need to follow-up with cardiology and pulmonology outpatient.  Discussed all these findings with both the patient and her daughter.  Patient on  diuretics.    Patient at baseline ambulates with a power scooter, has home health paid caretakers at at her independent living facility.  PT/OT ordered to evaluate if patient is at her baseline and can return to her independent living facility.    Patient has known history of abdominal malignancy, she follows with GYN, they are following the omental nodule.    We did discuss goals of care, patient is full code, however if overall poor prognosis -patient would transition to comfort care/hospice.    --------    Anticipate discharge home tomorrow with oxygen and remote monitoring, close pulmonary and cardiology follow-up.    Patient's daughter was at bedside, updated on plan of care, all questions answered.    I have discussed this patient's plan of care and discharge plan at IDT rounds today with Case Management, Nursing, Nursing leadership, and other members of the IDT team.    Consultants/Specialty  None    Code Status  Full Code    Disposition  Patient is not medically cleared for discharge.   Anticipate discharge to to home with close outpatient follow-up.  I have placed the appropriate orders for post-discharge needs.    Review of Systems  Review of Systems   All other systems reviewed and are negative.     Physical Exam  Temp:  [36.1 °C (97 °F)-37.4 °C (99.3 °F)] 36.4 °C (97.6 °F)  Pulse:  [68-87] 69  Resp:  [18-29] 18  BP: (127-171)/(51-78) 139/63  SpO2:  [77 %-98 %] 93 %    Physical Exam  Vitals and nursing note reviewed.   Constitutional:       General: She is not in acute distress.     Appearance: Normal appearance.   HENT:      Head: Normocephalic and atraumatic.      Mouth/Throat:      Mouth: Mucous membranes are moist.      Pharynx: No oropharyngeal exudate.   Eyes:      Extraocular Movements: Extraocular movements intact.      Pupils: Pupils are equal, round, and reactive to light.   Cardiovascular:      Rate and Rhythm: Normal rate and regular rhythm.      Pulses: Normal pulses.      Heart sounds: No  murmur heard.    No friction rub. No gallop.   Pulmonary:      Effort: Pulmonary effort is normal. No respiratory distress.      Breath sounds: No wheezing, rhonchi or rales.   Abdominal:      General: Bowel sounds are normal. There is no distension.      Palpations: Abdomen is soft. There is no mass.      Tenderness: There is no abdominal tenderness.   Musculoskeletal:         General: No swelling or tenderness. Normal range of motion.      Cervical back: Normal range of motion. No rigidity. No muscular tenderness.      Right lower leg: No edema.      Left lower leg: No edema.   Skin:     General: Skin is warm and dry.      Capillary Refill: Capillary refill takes less than 2 seconds.      Findings: No erythema or rash.   Neurological:      General: No focal deficit present.      Mental Status: She is alert and oriented to person, place, and time.      Motor: No weakness.      Gait: Gait normal.       Fluids    Intake/Output Summary (Last 24 hours) at 9/5/2022 1403  Last data filed at 9/5/2022 0500  Gross per 24 hour   Intake 800 ml   Output 1000 ml   Net -200 ml       Laboratory  Recent Labs     09/04/22  1601   WBC 9.8   RBC 4.94   HEMOGLOBIN 14.7   HEMATOCRIT 47.0   MCV 95.1   MCH 29.8   MCHC 31.3*   RDW 58.0*   PLATELETCT 279   MPV 9.0     Recent Labs     09/04/22  1601   SODIUM 131*   POTASSIUM 5.0   CHLORIDE 96   CO2 26   GLUCOSE 126*   BUN 17   CREATININE 0.60   CALCIUM 9.3                   Imaging  CT-CTA CHEST PULMONARY ARTERY W/ RECONS   Final Result      1.  No pulmonary embolus.   2.  Moderate bilateral pleural effusions and associated atelectasis.   3.  Stable multiple thyroid nodules, unchanged since recent prior study.   4.  Mild nodularity of the left upper quadrant omentum, nonspecific. Omental edema and carcinomatosis are both possible. Please correlate with history of malignancy.   5.  Moderate hiatal hernia containing small amount of ascites.      US-EXTREMITY VENOUS LOWER BILAT          EC-ECHOCARDIOGRAM COMPLETE W/O CONT   Final Result      DX-CHEST-PORTABLE (1 VIEW)   Final Result      1.  Cardiomegaly and bilateral interstitial opacities, possibly edema. Small bilateral pleural effusions.   2.  No lobar consolidation to suggest pneumonia.           Assessment/Plan  * Acute hypoxemic respiratory failure due to COVID-19 (HCC)- (present on admission)  Assessment & Plan  Continue with COVID isolation   Encourage prone positioning  C/w oxygen supplementation to keep O2 sat>92%   Blood cultures in process  Procal wnl hold abx  Venous doppler negative for DVT, CTA negative for PE, however noted moderate bilateral pleural effusions.  Mild nodularity of LUQ omentum -omental edema and carcinomatosis possible?  C/w Decadron 6 mg daily x 9 doses   Tylenol every 6 hours for fever/myalgias   Avoid NSAID's   If patient develops respiratory distress place on high flow oxygen if no improvement will consult ICU for possible intubation   If patient becomes hypotensive avoid excessive fluid resuscitation- suggest starting pressors early.       Left ventricular to right atrial shunt  Assessment & Plan  ECHO noted normal LVEF, positive bubble study at rest and with Valsalva -significant atrial shunt, severe pulmonary hypertension with pressures of 68 mmHg.  Patient will need to follow-up with cardiology and pulmonology outpatient.    Severe pulmonary hypertension (HCC)  Assessment & Plan  ECHO noted normal LVEF, positive bubble study at rest and with Valsalva -significant atrial shunt, severe pulmonary hypertension with pressures of 68 mmHg.  Patient will need to follow-up with cardiology and pulmonology outpatient.    Acute pulmonary edema (HCC)  Assessment & Plan  Force diuresis with IV lasix   ARB for Afterload reduction   TTE     Mild depression (HCC)- (present on admission)  Assessment & Plan  C/w SSRI    Morbid obesity (HCC)- (present on admission)  Assessment & Plan  Educate on lifestyle and dietary  modification     Essential hypertension- (present on admission)  Assessment & Plan  C/w norvasc and losartan     Hyperlipidemia- (present on admission)  Assessment & Plan  Statin       VTE prophylaxis: SCDs/TEDs and enoxaparin ppx    I have performed a physical exam and reviewed and updated ROS and Plan today (9/5/2022). In review of yesterday's note (9/4/2022), there are no changes except as documented above.

## 2022-09-05 NOTE — ASSESSMENT & PLAN NOTE
ECHO noted normal LVEF, positive bubble study at rest and with Valsalva -significant atrial shunt, severe pulmonary hypertension with pressures of 68 mmHg.  Patient will need to follow-up with cardiology and pulmonology outpatient.

## 2022-09-05 NOTE — THERAPY
Physical Therapy   Initial Evaluation     Patient Name: Olive Alexandra  Age:  82 y.o., Sex:  female  Medical Record #: [id number]  Today's Date: 9/5/2022     Precautions  Precautions: Fall Risk    Assessment  Patient is 82 y.o. female with a diagnosis of medical history of hypertension, morbid obesity who presented 9/4/2022 with shortness of breath.  She reports she was feeling subjective fevers, chills and could not breathe.  As per EMS she was 50% on room air which improved with 4 L of nasal cannula.  She denies any loss of taste, diarrhea or abdominal pain.  States she took a COVID-19 home test yesterday which was positive.In the ER, patient was endorsed to medicine for COVID-19 pneumonia.      Additional factors influencing patient status / progress : Discussion today with pt re PLF: Pt has been nonambulatory for the last 9 years, Lives at [facility] living with her spouse, has caregivers 7 days a week for 2 hours in am and 2 hours in pm who assist with brief/diaper change and dressing, assisting her to slide board from bed to her scooter where she spends most of her time. PM caregivers assist pt back to bed. Pt does not shower, caregivers do bed bath. Pt reports having a very busy life, attending exercise classes from her scooter and going to meals in dining room with friends and running activities for the other residents. Pt reports that she cannot scoot along EOB here due to joint issues, but that family can bring one of her scooters tomorrow and her slide board to trial getting from bed to her scooter. Trying to recreate pt's daily activity without her own scooter would be a problem due to height difference between bed and hospital wc, pt unable to tolerate scooting up hill. Plan made today to trial a slide board transfer from bed to pt's scooter tomorrow am while O2 is monitored. Pt reports feeling much better that when she arrived. PT to follow with temitope on 9/6.    Plan    PT to complete  temitope on 9/6.     DC Equipment Recommendations:  (O2 needs)  Discharge Recommendations:  (likely home with HH follow up for O2 monitoring.)            Objective       09/05/22 1416   Charge Group   PT Self Care / Home Evaluation  1   Total Time Spent   PT Total Time Yes   PT Self Care/Home Evaluation Time Spent (Mins) 15   PT Total Time Spent (Calculated) 15   Initial Contact Note    Initial Contact Note Order Received and Verified, Physical Therapy Evaluation in Progress with Full Report to Follow.   Precautions   Precautions Fall Risk   Vitals   Pulse Oximetry 91 %   O2 (LPM) 2   O2 Delivery Device Silicone Nasal Cannula   Vitals Comments at rest on 2l, 91%.   Prior Living Situation   Prior Services Other (Comments)  (paid caregivers 7 days a week: 2 hrs am, 2 hrs pm)   Housing / Facility Intermediate Care  ([facility] indep living side[facility]   Steps Into Home 0   Steps In Home 0   Equipment Owned Scooter;Slide Board   Lives with - Patient's Self Care Capacity Spouse   Comments Pt and spouse have two rooms, pt has her own space with caregivers that help with changing her brief and clean up in am, dressing, slide board to scooter. In pm, caregivers come to assist with undressing and into bed. Pt does not shower, caregivers give bed bath and pt gets hair done at salon once a week.   Prior Level of Functional Mobility   Bed Mobility Required Assist   Transfer Status Required Assist   Ambulation Dependent  (has not walked in 9 years)   Assistive Devices Used Scooter  (seated slide board to scooter)   Comments Pt spends all day on her scooter since it is the most comfortable and easiest for slide board use. Pt reports having trouble finding a chair other than her scooter due to all other chairs have armrests that are not removeable.   Cognition    Cognition / Consciousness WDL   Comments alert, sharp, able to answer all questions clearly   Education Group   Education Provided Role of Physical Therapist   Role of  Physical Therapist Patient Response Patient;Acceptance;Explanation;Verbal Demonstration   Anticipated Discharge Equipment and Recommendations   DC Equipment Recommendations   (O2 needs)   Discharge Recommendations   (likely home with HH follow up for O2 monitoring.)   Interdisciplinary Plan of Care Collaboration   IDT Collaboration with  Nursing   Patient Position at End of Therapy In Bed;Call Light within Reach;Tray Table within Reach;Phone within Reach   Collaboration Comments See progress note re discussion with pt, plan made for tomorrow.   Session Information   Date / Session Number  9/5-ed only, Eval to be done 9/6

## 2022-09-05 NOTE — PROGRESS NOTES
4 Eyes Skin Assessment Completed by Mk RN and Cuong RN.    Head WDL  Ears WDL  Nose WDL  Mouth WDL  Neck WDL  Breast/Chest WDL  Shoulder Blades WDL  Spine WDL  (R) Arm/Elbow/Hand WDL  (L) Arm/Elbow/Hand WDL  Abdomen WDL  Groin Redness and Blanching  Scrotum/Coccyx/Buttocks Redness and Blanching  (R) Leg WDL  (L) Leg WDL  (R) Heel/Foot/Toe slight redness and blanching    (L) Heel/Foot/Toe Redness and Blanching          Devices In Places Nasal Cannula      Interventions In Place N/A    Possible Skin Injury Yes    Pictures Uploaded Into Epic Yes  Wound Consult Placed Yes  RN Wound Prevention Protocol Ordered Yes

## 2022-09-05 NOTE — DISCHARGE INSTRUCTIONS
Discharge Instructions    Discharged to home by car with friend. Discharged via wheelchair, hospital escort: Yes.  Special equipment needed: Not Applicable    Be sure to schedule a follow-up appointment with your primary care doctor or any specialists as instructed.     Discharge Plan:   Diet Plan: Discussed  Activity Level: Discussed  Confirmed Follow up Appointment: Patient to Call and Schedule Appointment  Confirmed Symptoms Management: Discussed  Medication Reconciliation Updated: Yes    I understand that a diet low in cholesterol, fat, and sodium is recommended for good health. Unless I have been given specific instructions below for another diet, I accept this instruction as my diet prescription.   Other diet: Diabetic    Special Instructions: None    -Is this patient being discharged with medication to prevent blood clots?  No    Is patient discharged on Warfarin / Coumadin?   No       COVID-19  COVID-19 is a respiratory infection that is caused by a virus called severe acute respiratory syndrome coronavirus 2 (SARS-CoV-2). The disease is also known as coronavirus disease or novel coronavirus. In some people, the virus may not cause any symptoms. In others, it may cause a serious infection. The infection can get worse quickly and can lead to complications, such as:  Pneumonia, or infection of the lungs.  Acute respiratory distress syndrome or ARDS. This is fluid build-up in the lungs.  Acute respiratory failure. This is a condition in which there is not enough oxygen passing from the lungs to the body.  Sepsis or septic shock. This is a serious bodily reaction to an infection.  Blood clotting problems.  Secondary infections due to bacteria or fungus.  The virus that causes COVID-19 is contagious. This means that it can spread from person to person through droplets from coughs and sneezes (respiratory secretions).  What are the causes?  This illness is caused by a virus. You may catch the virus by:  Breathing  in droplets from an infected person's cough or sneeze.  Touching something, like a table or a doorknob, that was exposed to the virus (contaminated) and then touching your mouth, nose, or eyes.  What increases the risk?  Risk for infection  You are more likely to be infected with this virus if you:  Live in or travel to an area with a COVID-19 outbreak.  Come in contact with a sick person who recently traveled to an area with a COVID-19 outbreak.  Provide care for or live with a person who is infected with COVID-19.  Risk for serious illness  You are more likely to become seriously ill from the virus if you:  Are 65 years of age or older.  Have a long-term disease that lowers your body's ability to fight infection (immunocompromised).  Live in a nursing home or long-term care facility.  Have a long-term (chronic) disease such as:  Chronic lung disease, including chronic obstructive pulmonary disease or asthma  Heart disease.  Diabetes.  Chronic kidney disease.  Liver disease.  Are obese.  What are the signs or symptoms?  Symptoms of this condition can range from mild to severe. Symptoms may appear any time from 2 to 14 days after being exposed to the virus. They include:  A fever.  A cough.  Difficulty breathing.  Chills.  Muscle pains.  A sore throat.  Loss of taste or smell.  Some people may also have stomach problems, such as nausea, vomiting, or diarrhea.  Other people may not have any symptoms of COVID-19.  How is this diagnosed?  This condition may be diagnosed based on:  Your signs and symptoms, especially if:  You live in an area with a COVID-19 outbreak.  You recently traveled to or from an area where the virus is common.  You provide care for or live with a person who was diagnosed with COVID-19.  A physical exam.  Lab tests, which may include:  A nasal swab to take a sample of fluid from your nose.  A throat swab to take a sample of fluid from your throat.  A sample of mucus from your lungs  (sputum).  Blood tests.  Imaging tests, which may include, X-rays, CT scan, or ultrasound.  How is this treated?  At present, there is no medicine to treat COVID-19. Medicines that treat other diseases are being used on a trial basis to see if they are effective against COVID-19.  Your health care provider will talk with you about ways to treat your symptoms. For most people, the infection is mild and can be managed at home with rest, fluids, and over-the-counter medicines.  Treatment for a serious infection usually takes places in a hospital intensive care unit (ICU). It may include one or more of the following treatments. These treatments are given until your symptoms improve.  Receiving fluids and medicines through an IV.  Supplemental oxygen. Extra oxygen is given through a tube in the nose, a face mask, or a leiva.  Positioning you to lie on your stomach (prone position). This makes it easier for oxygen to get into the lungs.  Continuous positive airway pressure (CPAP) or bi-level positive airway pressure (BPAP) machine. This treatment uses mild air pressure to keep the airways open. A tube that is connected to a motor delivers oxygen to the body.  Ventilator. This treatment moves air into and out of the lungs by using a tube that is placed in your windpipe.  Tracheostomy. This is a procedure to create a hole in the neck so that a breathing tube can be inserted.  Extracorporeal membrane oxygenation (ECMO). This procedure gives the lungs a chance to recover by taking over the functions of the heart and lungs. It supplies oxygen to the body and removes carbon dioxide.  Follow these instructions at home:  Lifestyle  If you are sick, stay home except to get medical care. Your health care provider will tell you how long to stay home. Call your health care provider before you go for medical care.  Rest at home as told by your health care provider.  Do not use any products that contain nicotine or tobacco, such as  cigarettes, e-cigarettes, and chewing tobacco. If you need help quitting, ask your health care provider.  Return to your normal activities as told by your health care provider. Ask your health care provider what activities are safe for you.  General instructions  Take over-the-counter and prescription medicines only as told by your health care provider.  Drink enough fluid to keep your urine pale yellow.  Keep all follow-up visits as told by your health care provider. This is important.  How is this prevented?    There is no vaccine to help prevent COVID-19 infection. However, there are steps you can take to protect yourself and others from this virus.  To protect yourself:   Do not travel to areas where COVID-19 is a risk. The areas where COVID-19 is reported change often. To identify high-risk areas and travel restrictions, check the CDC travel website: wwwnc.cdc.gov/travel/notices  If you live in, or must travel to, an area where COVID-19 is a risk, take precautions to avoid infection.  Stay away from people who are sick.  Wash your hands often with soap and water for 20 seconds. If soap and water are not available, use an alcohol-based hand .  Avoid touching your mouth, face, eyes, or nose.  Avoid going out in public, follow guidance from your state and local health authorities.  If you must go out in public, wear a cloth face covering or face mask.  Disinfect objects and surfaces that are frequently touched every day. This may include:  Counters and tables.  Doorknobs and light switches.  Sinks and faucets.  Electronics, such as phones, remote controls, keyboards, computers, and tablets.  To protect others:  If you have symptoms of COVID-19, take steps to prevent the virus from spreading to others.  If you think you have a COVID-19 infection, contact your health care provider right away. Tell your health care team that you think you may have a COVID-19 infection.  Stay home. Leave your house only to  seek medical care. Do not use public transport.  Do not travel while you are sick.  Wash your hands often with soap and water for 20 seconds. If soap and water are not available, use alcohol-based hand .  Stay away from other members of your household. Let healthy household members care for children and pets, if possible. If you have to care for children or pets, wash your hands often and wear a mask. If possible, stay in your own room, separate from others. Use a different bathroom.  Make sure that all people in your household wash their hands well and often.  Cough or sneeze into a tissue or your sleeve or elbow. Do not cough or sneeze into your hand or into the air.  Wear a cloth face covering or face mask.  Where to find more information  Centers for Disease Control and Prevention: www.cdc.gov/coronavirus/2019-ncov/index.html  World Health Organization: www.who.int/health-topics/coronavirus  Contact a health care provider if:  You live in or have traveled to an area where COVID-19 is a risk and you have symptoms of the infection.  You have had contact with someone who has COVID-19 and you have symptoms of the infection.  Get help right away if:  You have trouble breathing.  You have pain or pressure in your chest.  You have confusion.  You have bluish lips and fingernails.  You have difficulty waking from sleep.  You have symptoms that get worse.  These symptoms may represent a serious problem that is an emergency. Do not wait to see if the symptoms will go away. Get medical help right away. Call your local emergency services (911 in the [place] Do not drive yourself to the hospital. Let the emergency medical personnel know if you think you have COVID-19.  Summary  COVID-19 is a respiratory infection that is caused by a virus. It is also known as coronavirus disease or novel coronavirus. It can cause serious infections, such as pneumonia, acute respiratory distress syndrome, acute respiratory failure, or  sepsis.  The virus that causes COVID-19 is contagious. This means that it can spread from person to person through droplets from coughs and sneezes.  You are more likely to develop a serious illness if you are 65 years of age or older, have a weak immunity, live in a nursing home, or have chronic disease.  There is no medicine to treat COVID-19. Your health care provider will talk with you about ways to treat your symptoms.  Take steps to protect yourself and others from infection. Wash your hands often and disinfect objects and surfaces that are frequently touched every day. Stay away from people who are sick and wear a mask if you are sick.  This information is not intended to replace advice given to you by your health care provider. Make sure you discuss any questions you have with your health care provider.  Document Released: 01/23/2020 Document Revised: 05/14/2020 Document Reviewed: 01/23/2020  [employer] Patient Education © 2020 [employer]

## 2022-09-05 NOTE — ASSESSMENT & PLAN NOTE
Continue with COVID isolation   Encourage prone positioning  C/w oxygen supplementation to keep O2 sat>92%   Blood cultures in process  Procal wnl hold abx  Venous doppler negative for DVT, CTA negative for PE, however noted moderate bilateral pleural effusions.  Mild nodularity of LUQ omentum -omental edema and carcinomatosis possible?  C/w Decadron 6 mg daily x 9 doses   Tylenol every 6 hours for fever/myalgias   Avoid NSAID's   If patient develops respiratory distress place on high flow oxygen if no improvement will consult ICU for possible intubation   If patient becomes hypotensive avoid excessive fluid resuscitation- suggest starting pressors early.

## 2022-09-05 NOTE — HOSPITAL COURSE
This is an 82-year-old female with past medical history of hypertension, dyslipidemia, chronic pain syndrome and morbid obesity who was admitted on 09/4/2022 for acute hypoxemic respiratory failure secondary to COVID-19 pneumonia.    Patient is COVID vaccinated x4.  Patient tested positive on 09/03 with a home test, confirmed on PCR on 09/4/2022. No leukocytosis, procalcitonin negative, D-dimer elevated, CTA negative for PE and venous doppler negative for DVT. Patient started on Decadron.  She is currently on 2 L of oxygen.  Home O2 eval to be performed, patient will require oxygen at rest and on exertion.  She will be set up with remote O2 monitoring as she has a smart phone.    ECHO noted normal LVEF, positive bubble study at rest and with Valsalva -significant atrial shunt, severe pulmonary hypertension with pressures of 68 mmHg.  Patient will need to follow-up with cardiology and pulmonology outpatient.  Information and referrals have been placed for this patient.  These findings of severe pulmonary hypertension as well as with COVID-19 pneumonia, resulted in the patient requiring oxygen.  Patient started on diuretics, discontinued her amlodipine and losartan to allow for use of diuretics.    Patient at baseline ambulates with a power scooter, has home health paid caretakers at at her independent living facility.  PT/OT ordered to ensure she is at her baseline and can return to her independent living facility.    Confirmed with living facility, patient lives in an independent apartment, she can quarantine there for the remaining of her quarantine.  Patient has paid home caretakers that visit her twice a day.

## 2022-09-05 NOTE — PROGRESS NOTES
This RN admitted patient. Educated her on isolation and fall risk precautions. CT with contrast ordered by Attending. Notified MD that pt is allergic to iodine and CT is requesting pre-meds for procedure. Will wait for further orders. Pt is sleeping and appears comfortable. Bedside table and call light are within reach.

## 2022-09-05 NOTE — PROGRESS NOTES
Pt incontinent x2. Purwick removed for infection prevention. Pre-meds given for CT, awaiting transport arrival.

## 2022-09-05 NOTE — H&P
Hospital Medicine History & Physical Note    Date of Service  9/4/2022    Primary Care Physician  Tamar Hoff M.D.    Consultants  none    Specialist Names: none    Code Status  Full Code    Chief Complaint  Chief Complaint   Patient presents with    Coronavirus Screening    Hypoxemia       History of Presenting Illness  Olive Alexandra is a 82 y.o. female past medical history of hypertension, morbid obesity who presented 9/4/2022 with shortness of breath.  She reports she was feeling subjective fevers, chills and could not breathe.  As per EMS she was 50% on room air which improved with 4 L of nasal cannula.  She denies any loss of taste, diarrhea or abdominal pain.  States she took a COVID-19 home test yesterday which was positive.    In the ER, patient was endorsed to medicine for COVID-19 pneumonia.  D-dimer returned elevated and a stat CTA chest was ordered.  Patient has mild allergy to iodine.  She will require premedications Lovenox 1 mg/kg will be ordered as she is high risk for PE given COVID-19 infection    I discussed the plan of care with patient.    Review of Systems  Review of Systems   Constitutional:  Positive for chills and fever.   HENT:  Negative for hearing loss and tinnitus.    Eyes:  Negative for blurred vision and double vision.   Respiratory:  Positive for shortness of breath. Negative for cough and hemoptysis.    Cardiovascular:  Negative for chest pain and palpitations.   Gastrointestinal:  Negative for heartburn and nausea.   Genitourinary:  Negative for dysuria and urgency.   Musculoskeletal:  Negative for myalgias and neck pain.   Skin:  Negative for rash.   Neurological:  Negative for dizziness and headaches.   Endo/Heme/Allergies:  Does not bruise/bleed easily.   Psychiatric/Behavioral:  Negative for depression and suicidal ideas.      Past Medical History   has a past medical history of Anemia, COVID-19, Depression, Hyperlipidemia, Hypertension, Obesity,  Prediabetes, and Uterine cancer (HCC).    Surgical History   has a past surgical history that includes open reduction; abdominal hysterectomy total; hernia repair; other; and colon resection.     Family History  family history includes Arthritis in her maternal aunt, maternal grandfather, maternal grandmother, and mother; Heart Disease in her paternal grandmother; Lung Disease in her maternal grandfather; No Known Problems in her brother, brother, father, and sister.   Family history reviewed with patient. There is no family history that is pertinent to the chief complaint.     Social History   reports that she has never smoked. She has never used smokeless tobacco. She reports current alcohol use. She reports that she does not use drugs.    Allergies  Allergies   Allergen Reactions    Iodine Vomiting and Rash     Has a Shellfish allergy.  Is OK with contrast if given pre-meds first.  Has not tried topical iodine/betadine - doesn't want to chance it  Has a Shellfish allergy.  Is OK with contrast if given pre-meds first.  Has not tried topical iodine/betadine - doesn't want to chance it      Shrimp (Diagnostic)      Vomiting, feeling sick.     Gramineae Pollens Unspecified and Rash     Runny nose      Penicillins Rash     Decades ago  Decades ago as a child         Medications  Prior to Admission Medications   Prescriptions Last Dose Informant Patient Reported? Taking?   B Complex-Folic Acid (B COMPLEX-VITAMIN B12 PO)   Yes No   Sig: Take 1 Tablet by mouth.   HYDROcodone-acetaminophen (NORCO) 7.5-325 MG tab   No No   Sig: Take 1 Tablet by mouth every 12 hours as needed for Moderate Pain or Severe Pain for up to 30 days. From August 9, 2022 till September 8, 2022   HYDROcodone-acetaminophen (NORCO) 7.5-325 MG tab   No No   Sig: Take 1 Tablet by mouth every 12 hours as needed for Moderate Pain or Severe Pain for up to 30 days.   HYDROcodone-acetaminophen (NORCO) 7.5-325 MG tab   No No   Sig: Take 1 Tablet by mouth  every 12 hours as needed for Moderate Pain or Severe Pain for up to 30 days.   HYDROcodone-acetaminophen (NORCO) 7.5-325 MG tab   No No   Sig: Take 1 Tablet by mouth every 12 hours as needed for Moderate Pain or Severe Pain for up to 30 days.   Multiple Vitamins-Minerals (PRESERVISION AREDS 2) Cap   Yes No   amLODIPine (NORVASC) 5 MG Tab   No No   Sig: Take 1 Tablet by mouth every day.   ascorbic acid (VITAMIN C) 100 MG tablet   Yes No   azelastine (ASTELIN) 137 MCG/SPRAY nasal spray   No No   Sig: Administer 1 Spray into affected nostril(S) 2 times a day.   citalopram (CELEXA) 20 MG Tab   No No   Sig: Take 1 Tablet by mouth every day.   ferrous sulfate 325 (65 Fe) MG tablet   No No   Sig: Take 1 Tablet by mouth 2 times a day.   losartan (COZAAR) 100 MG Tab   No No   Sig: Take 1 Tablet by mouth every day.   pravastatin (PRAVACHOL) 20 MG Tab   No No   Sig: Take 1 Tablet by mouth at bedtime.   ursodiol (ACTIGALL) 300 MG Cap   Yes No   Sig: Take 300 mg by mouth 2 times a day.   vitamin D3, cholecalciferol, 400 UNIT Tab tablet   Yes No   Sig: Take 10 mcg by mouth every day.      Facility-Administered Medications: None       Physical Exam  Temp:  [36.2 °C (97.2 °F)-37.4 °C (99.3 °F)] 36.2 °C (97.2 °F)  Pulse:  [76-87] 77  Resp:  [18-29] 20  BP: (142-171)/(57-78) 142/57  SpO2:  [77 %-98 %] 94 %  Blood Pressure : (!) 142/57 (rn is aware.)   Temperature: 36.2 °C (97.2 °F)   Pulse: 77   Respiration: 20   Pulse Oximetry: 94 %       Physical Exam  Vitals and nursing note reviewed.   Constitutional:       General: She is not in acute distress.     Appearance: Normal appearance. She is not ill-appearing.      Comments: Morbidely obese    HENT:      Head: Normocephalic and atraumatic.      Right Ear: Tympanic membrane normal.      Left Ear: Tympanic membrane normal.      Nose: Nose normal.      Mouth/Throat:      Mouth: Mucous membranes are moist.      Pharynx: Oropharynx is clear.   Eyes:      Extraocular Movements: Extraocular  movements intact.      Pupils: Pupils are equal, round, and reactive to light.   Cardiovascular:      Rate and Rhythm: Normal rate and regular rhythm.      Pulses: Normal pulses.      Heart sounds: Normal heart sounds.   Pulmonary:      Effort: Pulmonary effort is normal. No respiratory distress.      Breath sounds: Normal breath sounds. No stridor. Rales: scattered.      Comments: Scattered crackles   Abdominal:      General: Bowel sounds are normal. There is no distension.      Palpations: Abdomen is soft. There is no mass.   Musculoskeletal:         General: No swelling or tenderness. Normal range of motion.      Cervical back: Neck supple.   Skin:     General: Skin is warm.      Capillary Refill: Capillary refill takes less than 2 seconds.      Coloration: Skin is not jaundiced or pale.   Neurological:      General: No focal deficit present.      Mental Status: She is alert and oriented to person, place, and time. Mental status is at baseline.      Cranial Nerves: No cranial nerve deficit.      Sensory: No sensory deficit.   Psychiatric:         Mood and Affect: Mood normal.         Behavior: Behavior normal.       Laboratory:  Recent Labs     09/04/22  1601   WBC 9.8   RBC 4.94   HEMOGLOBIN 14.7   HEMATOCRIT 47.0   MCV 95.1   MCH 29.8   MCHC 31.3*   RDW 58.0*   PLATELETCT 279   MPV 9.0     Recent Labs     09/04/22  1601   SODIUM 131*   POTASSIUM 5.0   CHLORIDE 96   CO2 26   GLUCOSE 126*   BUN 17   CREATININE 0.60   CALCIUM 9.3     Recent Labs     09/04/22  1601   ALTSGPT 11   ASTSGOT 12   ALKPHOSPHAT 103*   TBILIRUBIN 0.5   GLUCOSE 126*         Recent Labs     09/04/22  1601   NTPROBNP 3953*         Recent Labs     09/04/22  1601   TROPONINT 27*       Imaging:  DX-CHEST-PORTABLE (1 VIEW)   Final Result      1.  Cardiomegaly and bilateral interstitial opacities, possibly edema. Small bilateral pleural effusions.   2.  No lobar consolidation to suggest pneumonia.      CT-CTA CHEST PULMONARY ARTERY W/ RECONS     (Results Pending)       X-Ray:  I have personally reviewed the images and compared with prior images.    Assessment/Plan:  Justification for Admission Status  I anticipate this patient will require at least two midnights for appropriate medical management, necessitating inpatient admission because acute hypoxic respiratory failure secondary to COVID-19 pneumonia.    Patient will need a Med/Surg bed on MEDICAL service .  The need is secondary to see above.    * Pneumonia due to COVID-19 virus- (present on admission)  Assessment & Plan  Continue with COVID isolation   Encourage prone positioning  C/w oxygen supplementation to keep O2 sat>92%   Blood cultures in process  Procal wnl hold abx  D dimer elevated - CTA Chest - needs to be premedicated. Benedryl and Decadron (ok with pharmacy as she received Decadron 6 mg IV.). Lovenox 1 mg/kg given shes high risk.  C/w Decadron 6 mg daily x 9 doses   Tylenol every 6 hours for fever/myalgias   Avoid NSAID's   If patient develops respiratory distress place on high flow oxygen if no improvement will consult ICU for possible intubation   If patient becomes hypotensive avoid excessive fluid resuscitation- suggest starting pressors early.       Acute pulmonary edema (HCC)  Assessment & Plan  Force diuresis with IV lasix   ARB for Afterload reduction   TTE     Acute respiratory failure with hypoxia (HCC)  Assessment & Plan  Secondary to covid 19  CTA Chest r/o PE     Mild depression (HCC)- (present on admission)  Assessment & Plan  C/w SSRI    Morbid obesity (HCC)- (present on admission)  Assessment & Plan  Educate on lifestyle and dietary modification     Essential hypertension- (present on admission)  Assessment & Plan  C/w norvasc and losartan     Hyperlipidemia- (present on admission)  Assessment & Plan  Statin      VTE prophylaxis: SCDs/TEDs

## 2022-09-06 ENCOUNTER — TELEPHONE (OUTPATIENT)
Dept: OTHER | Facility: MEDICAL CENTER | Age: 82
End: 2022-09-06
Payer: MEDICARE

## 2022-09-06 ENCOUNTER — PHARMACY VISIT (OUTPATIENT)
Dept: PHARMACY | Facility: MEDICAL CENTER | Age: 82
End: 2022-09-06
Payer: MEDICARE

## 2022-09-06 VITALS — HEART RATE: 74 BPM | RESPIRATION RATE: 23 BRPM | OXYGEN SATURATION: 90 %

## 2022-09-06 VITALS
BODY MASS INDEX: 47.09 KG/M2 | HEART RATE: 64 BPM | DIASTOLIC BLOOD PRESSURE: 60 MMHG | WEIGHT: 293 LBS | SYSTOLIC BLOOD PRESSURE: 153 MMHG | OXYGEN SATURATION: 95 % | RESPIRATION RATE: 15 BRPM | HEIGHT: 66 IN | TEMPERATURE: 97.7 F

## 2022-09-06 VITALS — HEART RATE: 70 BPM | OXYGEN SATURATION: 76 % | RESPIRATION RATE: 23 BRPM

## 2022-09-06 VITALS — OXYGEN SATURATION: 97 % | HEART RATE: 60 BPM | RESPIRATION RATE: 18 BRPM

## 2022-09-06 LAB
ANION GAP SERPL CALC-SCNC: 4 MMOL/L (ref 7–16)
BUN SERPL-MCNC: 22 MG/DL (ref 8–22)
CALCIUM SERPL-MCNC: 9.1 MG/DL (ref 8.5–10.5)
CHLORIDE SERPL-SCNC: 95 MMOL/L (ref 96–112)
CO2 SERPL-SCNC: 35 MMOL/L (ref 20–33)
CREAT SERPL-MCNC: 0.74 MG/DL (ref 0.5–1.4)
GFR SERPLBLD CREATININE-BSD FMLA CKD-EPI: 81 ML/MIN/1.73 M 2
GLUCOSE SERPL-MCNC: 102 MG/DL (ref 65–99)
MAGNESIUM SERPL-MCNC: 2 MG/DL (ref 1.5–2.5)
POTASSIUM SERPL-SCNC: 4.6 MMOL/L (ref 3.6–5.5)
SODIUM SERPL-SCNC: 134 MMOL/L (ref 135–145)

## 2022-09-06 PROCEDURE — 80048 BASIC METABOLIC PNL TOTAL CA: CPT

## 2022-09-06 PROCEDURE — 99453 REM MNTR PHYSIOL PARAM SETUP: CPT

## 2022-09-06 PROCEDURE — 700111 HCHG RX REV CODE 636 W/ 250 OVERRIDE (IP): Performed by: GENERAL PRACTICE

## 2022-09-06 PROCEDURE — 83735 ASSAY OF MAGNESIUM: CPT

## 2022-09-06 PROCEDURE — 700102 HCHG RX REV CODE 250 W/ 637 OVERRIDE(OP): Performed by: STUDENT IN AN ORGANIZED HEALTH CARE EDUCATION/TRAINING PROGRAM

## 2022-09-06 PROCEDURE — A9270 NON-COVERED ITEM OR SERVICE: HCPCS | Performed by: STUDENT IN AN ORGANIZED HEALTH CARE EDUCATION/TRAINING PROGRAM

## 2022-09-06 PROCEDURE — 99239 HOSP IP/OBS DSCHRG MGMT >30: CPT | Performed by: GENERAL PRACTICE

## 2022-09-06 PROCEDURE — A9270 NON-COVERED ITEM OR SERVICE: HCPCS | Performed by: GENERAL PRACTICE

## 2022-09-06 PROCEDURE — 99454 REM MNTR PHYSIOL PARAM 16-30: CPT

## 2022-09-06 PROCEDURE — 97161 PT EVAL LOW COMPLEX 20 MIN: CPT

## 2022-09-06 PROCEDURE — 700102 HCHG RX REV CODE 250 W/ 637 OVERRIDE(OP): Performed by: GENERAL PRACTICE

## 2022-09-06 RX ADMIN — FUROSEMIDE 40 MG: 10 INJECTION, SOLUTION INTRAMUSCULAR; INTRAVENOUS at 05:29

## 2022-09-06 RX ADMIN — DEXAMETHASONE 6 MG: 6 TABLET ORAL at 05:29

## 2022-09-06 RX ADMIN — URSODIOL 300 MG: 300 CAPSULE ORAL at 05:29

## 2022-09-06 RX ADMIN — HYDROCODONE BITARTRATE AND ACETAMINOPHEN 1 TABLET: 5; 325 TABLET ORAL at 07:56

## 2022-09-06 RX ADMIN — CITALOPRAM HYDROBROMIDE 20 MG: 20 TABLET ORAL at 05:30

## 2022-09-06 RX ADMIN — FERROUS SULFATE TAB 325 MG (65 MG ELEMENTAL FE) 325 MG: 325 (65 FE) TAB at 05:29

## 2022-09-06 ASSESSMENT — COGNITIVE AND FUNCTIONAL STATUS - GENERAL
MOVING TO AND FROM BED TO CHAIR: A LITTLE
MOBILITY SCORE: 13
MOVING FROM LYING ON BACK TO SITTING ON SIDE OF FLAT BED: UNABLE
STANDING UP FROM CHAIR USING ARMS: A LITTLE
SUGGESTED CMS G CODE MODIFIER MOBILITY: CL
CLIMB 3 TO 5 STEPS WITH RAILING: TOTAL
WALKING IN HOSPITAL ROOM: TOTAL

## 2022-09-06 ASSESSMENT — PAIN DESCRIPTION - PAIN TYPE: TYPE: ACUTE PAIN

## 2022-09-06 ASSESSMENT — GAIT ASSESSMENTS: GAIT LEVEL OF ASSIST: UNABLE TO PARTICIPATE

## 2022-09-06 NOTE — THERAPY
Occupational Therapy Contact Note      Patient Name: Olive Alexandra  Age:  82 y.o., Sex:  female  Medical Record #: [id number]  Today's Date: 9/6/2022 09/06/22 0945   Initial Contact Note    Initial Contact Note Order Received and Verified. Occupational Therapy Evaluation NOT Completed Because Patient Does Not Require Acute Occupational Therapy at this Time.   Interdisciplinary Plan of Care Collaboration   IDT Collaboration with  Nursing   Collaboration Comments Discussed with PT who reported pt has assist with all self cares at baseline, including dressing, toileting, sponge bathing. No OT needs indicated at this time. Defer to PT for mobility recommendations

## 2022-09-06 NOTE — TELEPHONE ENCOUNTER
RPM Notification: High Alert  Actions: None    Total time (minutes) spent communicating with patient: 2    At  1606 low SPo2 alert of 84%. Contacted patient, but there was no answer. I checked the computer and pt just d/c. Patient SPo2 increased to 90% right away. I left a voicemail for pt to call me back. I will continue to attempt to contact.

## 2022-09-06 NOTE — DISCHARGE PLANNING
Meds-to-Beds: Discharge prescription orders listed below delivered to patient in discharge lounge. RN notified. Patient counseled. Co-payment processed by pharmacy.       Current Outpatient Medications   Medication Sig Dispense Refill    [START ON 9/7/2022] dexamethasone (DECADRON) 6 MG Tab Take 1 Tablet by mouth every day for 7 days. 7 Tablet 0    furosemide (LASIX) 40 MG Tab Take 1 Tablet by mouth every day for 30 days. 30 Tablet 0    potassium chloride SA (K-DUR) 10 MEQ Tab CR Take 1 Tablet by mouth every day for 30 days. 30 Each 0      Shelbi March, PharmD

## 2022-09-06 NOTE — DISCHARGE PLANNING
Case Management Discharge Planning    Admission Date: 9/4/2022  GMLOS: 5.4  ALOS: 2    6-Clicks ADL Score: 19  6-Clicks Mobility Score: 9  PT and/or OT Eval ordered: Yes  Post-acute Referrals Ordered: Yes  Post-acute Choice Obtained: Yes  Has referral(s) been sent to post-acute provider:  Yes      Anticipated Discharge Dispo:      DME Needed: Yes    DME Ordered: Yes    Action(s) Taken: Choice obtained, Referral(s) sent, and DME Face to Face     Escalations Completed: None    Medically Clear: Yes    Next Steps: Order received for home O2, discussed with the patient. Patient requesting to use [facility] for her oxygen services. Choice form completed. Referral sent to [facility], pending acceptance and O2 delivery.      Barriers to Discharge: Oxygen Delivery    13:00 Addendum: [facility] has accepted patient for O2 services, oxygen should be delivered within an hour.

## 2022-09-06 NOTE — PROGRESS NOTES
Arrive to dc lounge via personal scooter. A/O, dc instructions reviewed w/ pt, verbalized understanding. Waiting for home O2 delivery and meds to bed.  w/ pt.

## 2022-09-06 NOTE — DISCHARGE SUMMARY
Discharge Summary    CHIEF COMPLAINT ON ADMISSION  Chief Complaint   Patient presents with    Coronavirus Screening    Hypoxemia       Reason for Admission  Sent By EMS     Admission Date  9/4/2022    CODE STATUS  Full Code    HPI & HOSPITAL COURSE  This is an 82-year-old female with past medical history of hypertension, dyslipidemia, chronic pain syndrome and morbid obesity who was admitted on 09/4/2022 for acute hypoxemic respiratory failure secondary to COVID-19 pneumonia.    Patient is COVID vaccinated x4.  Patient tested positive on 09/03 with a home test, confirmed on PCR on 09/4/2022. No leukocytosis, procalcitonin negative, D-dimer elevated, CTA negative for PE and venous doppler negative for DVT. Patient started on Decadron.  She is currently on 2 L of oxygen.  Home O2 eval to be performed, patient will require oxygen at rest and on exertion.  She will be set up with remote O2 monitoring as she has a smart phone.    ECHO noted normal LVEF, positive bubble study at rest and with Valsalva -significant atrial shunt, severe pulmonary hypertension with pressures of 68 mmHg.  Patient will need to follow-up with cardiology and pulmonology outpatient.  Information and referrals have been placed for this patient.  These findings of severe pulmonary hypertension as well as with COVID-19 pneumonia, resulted in the patient requiring oxygen.  Patient started on diuretics, discontinued her amlodipine and losartan to allow for use of diuretics.    Patient at baseline ambulates with a power scooter, has home health paid caretakers at at her independent living facility.  PT/OT ordered to ensure she is at her baseline and can return to her independent living facility.    Confirmed with living facility, patient lives in an independent apartment, she can quarantine there for the remaining of her quarantine.  Patient has paid home caretakers that visit her twice a day.    Therefore, she is discharged in good and stable  condition to home with close outpatient follow-up.    The patient met 2-midnight criteria for an inpatient stay at the time of discharge.    Discharge Date  09/6/2022    FOLLOW UP ITEMS POST DISCHARGE  Primary care physician  Pulmonology  Cardiology    DISCHARGE DIAGNOSES  Principal Problem:    Acute hypoxemic respiratory failure due to COVID-19 (HCC) POA: Yes  Active Problems:    Hyperlipidemia POA: Yes      Overview: On pravastatin    Essential hypertension (Chronic) POA: Yes      Overview: It is finally better controlled, now on losartan 100 mg and amlodipine 5       mg tablet daily.      Morbid obesity (HCC) POA: Yes      Overview: Body mass index is 43.07 kg/m².            Healthful lifestyle measures          Mild depression (HCC) POA: Yes      Overview: PHQ-9 Screening 3/29/2022 8/23/2021       Little interest or pleasure in doing things 0 - not at all 0 - not at all       Feeling down, depressed, or hopeless 0 - not at all 0 - not at all       PHQ-2 Total Score 0 0                   Interpretation of PHQ-9 Total Score       Score Severity       1-4 No Depression       5-9 Mild Depression       10-14 Moderate Depression       15-19 Moderately Severe Depression       20-27 Severe Depression      Chronic, stable on citalopram 20 mg tablet daily.  No SI/HI.    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 *      FOLLOW UP  Khushboo Handy M.D.  [address] [place] [address] [address]  [facility] [address]  [phone]    Schedule an appointment as soon as possible for a visit in 2 week(s)      Yandel Nunez M.D.  [address] [place] [address]  [facility] [address]  [phone]    Schedule an appointment as soon as possible for a visit in 2 week(s)      MEDICATIONS ON DISCHARGE     Medication List        START taking these medications        Instructions   dexamethasone 6 MG  Tabs  Start taking on: September 7, 2022  Commonly known as: DECADRON   Take 1 Tablet by mouth every day for 7 days.  Dose: 6 mg     furosemide 40 MG Tabs  Commonly known as: LASIX   Take 1 Tablet by mouth every day for 30 days.  Dose: 40 mg     potassium chloride SA 10 MEQ Tbcr  Commonly known as: K-DUR   Take 1 Tablet by mouth every day for 30 days.  Dose: 10 mEq            CHANGE how you take these medications        Instructions   HYDROcodone-acetaminophen 7.5-325 MG tab  What changed: Another medication with the same name was removed. Continue taking this medication, and follow the directions you see here.  Commonly known as: NORCO   Take 1 Tablet by mouth every 12 hours as needed for Moderate Pain or Severe Pain for up to 30 days. From August 9, 2022 till September 8, 2022  Dose: 1 Tablet            CONTINUE taking these medications        Instructions   ascorbic acid 100 MG tablet  Commonly known as: VITAMIN C      azelastine 137 MCG/SPRAY nasal spray  Commonly known as: ASTELIN   Administer 1 Spray into affected nostril(S) 2 times a day.  Dose: 1 Spray     B COMPLEX-VITAMIN B12 PO   Take 1 Tablet by mouth.  Dose: 1 Tablet     citalopram 20 MG Tabs  Commonly known as: CeleXA   Take 1 Tablet by mouth every day.  Dose: 20 mg     ferrous sulfate 325 (65 Fe) MG tablet   Take 1 Tablet by mouth 2 times a day.  Dose: 325 mg     pravastatin 20 MG Tabs  Commonly known as: PRAVACHOL   Take 1 Tablet by mouth at bedtime.  Dose: 20 mg     PreserVision AREDS 2 Caps      ursodiol 300 MG Caps  Commonly known as: ACTIGALL   Take 300 mg by mouth 2 times a day.  Dose: 300 mg     vitamin D3 (cholecalciferol) 400 UNIT Tabs tablet   Take 10 mcg by mouth every day.  Dose: 10 mcg            STOP taking these medications      amLODIPine 5 MG Tabs  Commonly known as: NORVASC     losartan 100 MG Tabs  Commonly known as: COZAAR              Allergies  Allergies   Allergen Reactions    Iodine Vomiting and Rash     Has a Shellfish  allergy.  Is OK with contrast if given pre-meds first.  Has not tried topical iodine/betadine - doesn't want to chance it  Has a Shellfish allergy.  Is OK with contrast if given pre-meds first.  Has not tried topical iodine/betadine - doesn't want to chance it      Shrimp (Diagnostic)      Vomiting, feeling sick.     Gramineae Pollens Unspecified and Rash     Runny nose      Penicillins Rash     Decades ago  Decades ago as a child         DIET  Orders Placed This Encounter   Procedures    Diet Order Diet: Cardiac     Standing Status:   Standing     Number of Occurrences:   1     Order Specific Question:   Diet:     Answer:   Cardiac [6]       ACTIVITY  As tolerated.  Weight bearing as tolerated    CONSULTATIONS  None    PROCEDURES  None    LABORATORY  Lab Results   Component Value Date    SODIUM 134 (L) 09/06/2022    POTASSIUM 4.6 09/06/2022    CHLORIDE 95 (L) 09/06/2022    CO2 35 (H) 09/06/2022    GLUCOSE 102 (H) 09/06/2022    BUN 22 09/06/2022    CREATININE 0.74 09/06/2022        Lab Results   Component Value Date    WBC 9.8 09/04/2022    HEMOGLOBIN 14.7 09/04/2022    HEMATOCRIT 47.0 09/04/2022    PLATELETCT 279 09/04/2022      CT-CTA CHEST PULMONARY ARTERY W/ RECONS   Final Result      1.  No pulmonary embolus.   2.  Moderate bilateral pleural effusions and associated atelectasis.   3.  Stable multiple thyroid nodules, unchanged since recent prior study.   4.  Mild nodularity of the left upper quadrant omentum, nonspecific. Omental edema and carcinomatosis are both possible. Please correlate with history of malignancy.   5.  Moderate hiatal hernia containing small amount of ascites.      US-EXTREMITY VENOUS LOWER BILAT   Final Result      EC-ECHOCARDIOGRAM COMPLETE W/O CONT   Final Result      DX-CHEST-PORTABLE (1 VIEW)   Final Result      1.  Cardiomegaly and bilateral interstitial opacities, possibly edema. Small bilateral pleural effusions.   2.  No lobar consolidation to suggest pneumonia.         Total time  of the discharge process exceeds 55 minutes.

## 2022-09-06 NOTE — CARE PLAN
The patient is Stable - Low risk of patient condition declining or worsening    Shift Goals  Clinical Goals: wean O2, manage covid symptoms  Patient Goals: go home  Family Goals: pt to get better    Progress made toward(s) clinical / shift goals:    Problem: Knowledge Deficit - Standard  Goal: Patient and family/care givers will demonstrate understanding of plan of care, disease process/condition, diagnostic tests and medications  Outcome: Progressing     Problem: Fall Risk  Goal: Patient will remain free from falls  Outcome: Progressing     Problem: Skin Integrity  Goal: Skin integrity is maintained or improved  Outcome: Progressing     Problem: Pain - Standard  Goal: Alleviation of pain or a reduction in pain to the patient’s comfort goal  Outcome: Progressing

## 2022-09-06 NOTE — THERAPY
Physical Therapy   Initial Evaluation     Patient Name: Olive Alexandra  Age:  82 y.o., Sex:  female  Medical Record #: [id number]  Today's Date: 9/6/2022     Precautions  Precautions: Fall Risk    Assessment  Patient is 82 y.o. female with a past medical history of hypertension, morbid obesity who presented 9/4/2022 with shortness of breath.  She reports she was feeling subjective fevers, chills and could not breathe.  As per EMS she was 50% on room air which improved with 4 L of nasal cannula.  She denies any loss of taste, diarrhea or abdominal pain.  States she took a COVID-19 home test yesterday which was positive.  In the ER, patient was endorsed to medicine for COVID-19 pneumonia..  Additional factors influencing patient status / progress : today, pt performs seated slide board from bed to her scooter with min A, needing to increase to 3L O2 during activity, up from the 2L she was using at rest. As described yesterday, pt has generous, experienced caregiving (2 hours in am, and 2 hours in pm daily at her Independent living facility where she uses her scooter for all activity. Pt also has assist from her spouse. Functionally, pt appears to be close to baseline function. Pt will need O2 upon dc back to independent living with home health for O2 follow up. .      Plan    Recommend Physical Therapy for Evaluation only   Patient will not be actively followed for physical therapy services at this time, however may be seen if requested by physician for 1 more visit within 30 days to address any discharge or equipment needs.     DC Equipment Recommendations: None (O2 being arranged)  Discharge Recommendations: Recommend home health for continued physical therapy services (Home health for O2 follow up)            Objective       09/06/22 1208   Charge Group   PT Evaluation PT Evaluation Low   Total Time Spent   PT Total Time Yes   PT Evaluation Time Spent (Mins) 15   PT Total Time Spent (Calculated) 15   Initial  Contact Note    Initial Contact Note Order Received and Verified, Evaluation Only - Patient Does Not Require Further Acute Physical Therapy at this Time.  However, May Benefit from Post Acute Therapy for Higher Level Functional Deficits.   Precautions   Precautions Fall Risk   Vitals   Pulse Oximetry 95 %  (during activity on 3L)   O2 (LPM) 3  (3L during exertion (slide board to scooter), 2L at rest.)   O2 Delivery Device Silicone Nasal Cannula   Vitals Comments desat to 87% with scooting to EOB on 2L so increased to 3L.   Pain 0 - 10 Group   Therapist Pain Assessment During Activity;Nurse Notified  (pt notes h/o R RC tear, lives with baseline multi-joint pain)   Prior Living Situation   Housing / Facility Independent Living Facility  ([facility])   Steps Into Home 0   Steps In Home 0   Equipment Owned Scooter;Slide Board   Comments see prior note, 4 hrs/day caregiver assist   Prior Level of Functional Mobility   Bed Mobility Required Assist   Transfer Status Independent   Ambulation Dependent   Assistive Devices Used Scooter  (with seated slide board)   Cognition    Cognition / Consciousness WDL   Active ROM Lower Body    Active ROM Lower Body  WDL   Comments functional for sitting EOB and on scooter.   Strength Lower Body   Lower Body Strength  X   Comments pt is non-ambulatory x 9 years, strength adequate for seated scooting.   Balance Assessment   Sitting Balance (Static) Good   Sitting Balance (Dynamic) Good   Weight Shift Sitting Fair   Gait Analysis   Gait Level Of Assist Unable to Participate   Bed Mobility    Supine to Sit Supervised  (with HOB up)   Sit to Supine   (up in scooter)   Scooting Minimal Assist  (along slide board to get into scooter seat)   Functional Mobility   Sit to Stand Unable to Participate   Bed, Chair, Wheelchair Transfer Minimal Assist   Transfer Method Slide Board   How much difficulty does the patient currently have...   Turning over in bed (including adjusting bedclothes, sheets and  blankets)? 4   Sitting down on and standing up from a chair with arms (e.g., wheelchair, bedside commode, etc.) 1   Moving from lying on back to sitting on the side of the bed? 3   How much help from another person does the patient currently need...   Moving to and from a bed to a chair (including a wheelchair)? 3   Need to walk in a hospital room? 1   Climbing 3-5 steps with a railing? 1   6 clicks Mobility Score 13   Activity Tolerance   Sitting in Chair 20+   Education Group   Role of Physical Therapist Patient Response Patient;Acceptance;Explanation;Verbal Demonstration   Anticipated Discharge Equipment and Recommendations   DC Equipment Recommendations None  (O2 being arranged)   Discharge Recommendations Recommend home health for continued physical therapy services  (Home health for O2 follow up)   Interdisciplinary Plan of Care Collaboration   IDT Collaboration with  Nursing   Patient Position at End of Therapy Seated;Call Light within Reach;Family / Friend in Room;Phone within Reach  (seated on scooter)   Collaboration Comments nsg updated, awaiting O2 then plans to dc to home   Session Information   Date / Session Number  9/6-1x only  (dc needs only)

## 2022-09-06 NOTE — PROGRESS NOTES
At change of shift This RN and CNA cleaned and changed patients linens and pads. Pure wick cannister was emptied and pure wick was replaced with a new one. Barrier cream was placed on Pt's buttocks bilaterally and perineum area. Pt currently appears comfortable reading and resting. Bedside table and call light are within reach.

## 2022-09-06 NOTE — CARE PLAN
The patient is Stable - Low risk of patient condition declining or worsening    Shift Goals  Clinical Goals: CT, wean O2/monitor sats, pulmonary toileting  Patient Goals: comfort, get better  Family Goals: pt to get better    Progress made toward(s) clinical / shift goals:    Problem: Knowledge Deficit - Standard  Goal: Patient and family/care givers will demonstrate understanding of plan of care, disease process/condition, diagnostic tests and medications  Outcome: Progressing  Note: Pt and family understand plan of care and are agreeable     Problem: Fall Risk  Goal: Patient will remain free from falls  Outcome: Progressing  Note: Safety maintained. Pt remains high fall risk.      Problem: Skin Integrity  Goal: Skin integrity is maintained or improved  Outcome: Progressing  Note: Skin intact.      Plan to d/c pt tomorrow after home O2 set up.

## 2022-09-06 NOTE — RESPIRATORY CARE
REMOTE MONITORING PROGRAM by RESPIRATORY THERAPY  2022 at 10:45 AM by Kina Vernon             Patient's name:  Olive Alexandra        MRN: [id number]       : 1940  Physical address: [address] [place] [address]  Cell phone # [phone]  Current oxygen requirement 2L  Ref # [id number] & Lot # [id number]  Emergency contact name & number: Jared Alexandra  [phone]  Primary language english      Patient agrees to Remote Monitoring program. Device instruction performed with welcome packet, consent signed and placed at bedside chart. Patient instructed on how to use MyChart and Zoom. No further questions at this time.

## 2022-09-06 NOTE — PROGRESS NOTES
PT stable. No distress noted. rEwin @ [facility]. Transferred to [facility] @ this time.

## 2022-09-07 ENCOUNTER — PATIENT OUTREACH (OUTPATIENT)
Dept: MEDICAL GROUP | Facility: MEDICAL CENTER | Age: 82
End: 2022-09-07
Payer: MEDICARE

## 2022-09-07 NOTE — TELEPHONE ENCOUNTER
RPM Notification: High Alert and Welcome Call  Actions: None    Total time (minutes) spent communicating with patient: 3    Called patient, she states she is doing well and is adjusting the nasal canula to try and get it more secure on her face. I did do the welcome call while on the phone with her. After taking a couple deep breaths her o2 came up to 95%.

## 2022-09-07 NOTE — TELEPHONE ENCOUNTER
RPM Notification: Patient Communication  Actions: None    Total time (minutes) spent communicating with patient: 2    I called pt to perform welcome call. There was no answer. I tried again at 1804. Still no answer. I left a two messages for pt to return my call.    I contacted SCOT Coleman at [phone] There was no answer. I left a message to please return my call.    I relayed info to night .

## 2022-09-08 ENCOUNTER — TELEPHONE (OUTPATIENT)
Dept: OTHER | Facility: MEDICAL CENTER | Age: 82
End: 2022-09-08
Payer: MEDICARE

## 2022-09-08 ENCOUNTER — PATIENT OUTREACH (OUTPATIENT)
Dept: MEDICAL GROUP | Facility: MEDICAL CENTER | Age: 82
End: 2022-09-08
Payer: MEDICARE

## 2022-09-08 NOTE — TELEPHONE ENCOUNTER
RPM Notification: Disconnect  Actions: Device Troubleshoot    Total time (minutes) spent communicating with patient: 3    Pt showed up as disconnected at 1603. I called and spoke with the pt and her , she had her oxygen on but said her monitor light was flashing green. I asked them to double check that bluetooth was on on their phone and connected, they said it had gotten turned off, they turned it back on and reconnected and said her light was now blue. Pt reconnected at 1607 with an SpO2 of 97%.

## 2022-09-09 ENCOUNTER — TELEPHONE (OUTPATIENT)
Dept: OTHER | Facility: MEDICAL CENTER | Age: 82
End: 2022-09-09
Payer: MEDICARE

## 2022-09-09 VITALS — HEART RATE: 73 BPM | RESPIRATION RATE: 14 BRPM | OXYGEN SATURATION: 96 %

## 2022-09-09 VITALS — HEART RATE: 74 BPM | OXYGEN SATURATION: 84 % | RESPIRATION RATE: 17 BRPM

## 2022-09-09 VITALS — RESPIRATION RATE: 20 BRPM | HEART RATE: 61 BPM | OXYGEN SATURATION: 98 %

## 2022-09-09 LAB
BACTERIA BLD CULT: NORMAL
BACTERIA BLD CULT: NORMAL
SIGNIFICANT IND 70042: NORMAL
SIGNIFICANT IND 70042: NORMAL
SITE SITE: NORMAL
SITE SITE: NORMAL
SOURCE SOURCE: NORMAL
SOURCE SOURCE: NORMAL

## 2022-09-09 NOTE — TELEPHONE ENCOUNTER
RPM Notification: Medium Alert  Actions: None    Total time (minutes) spent communicating with patient: 1      Pt alerted with an oxygen of 84% at 1305. Called pt and she said she is feeling good. Asked pt if she has any kinks on her nasal canula or if she is breathing through her mouth and pt said no. Pt looked at her vitals on her [employer] emil and saw that her O2 was at 81% pt said she is going to get her  to help her untwist her nasal canula and that she has to get off the phone to make a call

## 2022-09-09 NOTE — TELEPHONE ENCOUNTER
RPM Notification: Medium Alert  Actions: None    Total time (minutes) spent communicating with patient: 1          Called pt for an alert with her oxygen of 84% and pt said she will call me back after she gets her  to help her untwist her nasal canula. At 1315 pt O2 is 95%

## 2022-09-09 NOTE — TELEPHONE ENCOUNTER
RPM Notification: High Alert  Actions: None    Total time (minutes) spent communicating with patient: 2    At  0612 low SPo2 alert of 82%. Contacted patient, but there was no answer. I called again at 0615 there was no answer. I left a message for pt to return my call. Patient SPo2 increased to 98%.

## 2022-09-10 ENCOUNTER — TELEPHONE (OUTPATIENT)
Dept: OTHER | Facility: MEDICAL CENTER | Age: 82
End: 2022-09-10
Payer: MEDICARE

## 2022-09-10 VITALS — RESPIRATION RATE: 18 BRPM | HEART RATE: 60 BPM | OXYGEN SATURATION: 97 %

## 2022-09-10 NOTE — TELEPHONE ENCOUNTER
RPM Notification: Disconnect and Reconnect  Actions: Device Troubleshoot    Total time (minutes) spent communicating with patient: 10    At 0722 Disconnect. Contacted pt and no answer. Left voicemail on pt phone about being disconnected from monitor and wanting to help her get reconnected as well as make sure she is okay. At 0729 Tried calling pt again with no answer. At 0740 Tried contacting pt again still no answer. Even sent out a text to pt about being disconnected and needing to make sure she is okay. Contacted EC at 0747 and was sent straight to voicemail. Left a message on his phone stating that we been trying to get a hold of pt since she is disconnected from the monitor. Stated that we need to make sure pt is okay and get her reconnected. Advised him to call us back or have pt give us a call back. Stated that if we don't hear from them or pt gets reconnected we would have to follow our protocol and dispatch [facility] to their place as a wellness check. At 0800 Contacted pt again still no answer. Left another voicemail stating that we have been trying to get a hold of her since we are unable to see her vitals. Advised her we need to make sure she is okay and that we get her reconnected. Stated that if we don't hear from her or EC soon or gets reconnected back to the monitor we will have to dispatch [facility] to perform a wellness check. At 0818 Pt called us back. She and  apologized for not answering as they were sleeping. Pt stated how does she get reconnected. Started to troubleshoot with pt and . Walked them through how to change out the wristband since her monitor was flashing red having not changed the wristband since leaving the hospital.   At 0824 Reconnect. Pt was able to get reconnected back to the monitor. Pt and  asked how often they should change out wristbands. I stated the wristbands last about 2-4 days. Advised them they would know to change it when the light on the monitor turns  orange or red. Also stated that the [employer] might say sensor is defective as well. They asked how they can obtain more wristbands. I stated that they would give us a call when needing more so we can coordinate it with RT in the hospital to be picked up by the ER area. Stated that RT meets them at their car so they don't have to get out.  stated that seems inconvenient to have to  batteries when they don't last a long time. Stated their should be a better protocol to get supplies or they should be mailed instead of having them drive to pick them up. Stated to pt that is our protocol for obtaining new supplies since its a continuous monitoring program. Stated that anyone can  supplies for pt they just have to call us. He asked if RT could give them more than a couple I told him we can let RT know how many to bring to you as well. He stated that was fine and they both thanked me for the information and helping her get reconnected.

## 2022-09-11 ENCOUNTER — TELEPHONE (OUTPATIENT)
Dept: OTHER | Facility: MEDICAL CENTER | Age: 82
End: 2022-09-11
Payer: MEDICARE

## 2022-09-11 VITALS — HEART RATE: 59 BPM | OXYGEN SATURATION: 84 % | RESPIRATION RATE: 13 BRPM

## 2022-09-11 NOTE — TELEPHONE ENCOUNTER
RPM Notification: High Alert  Actions: None    Total time (minutes) spent communicating with patient: 0    At 1452 alert SpO2 84%.   At 1457 alert SpO2 84%.   Contacted pt twice with no answer. I left pt a voicemail stating that we are trying to get a hold of her due to low oxygen alerts. Advising pt that she was want to make sure she is okay. Pt oxygen is starting to go back up within normal range, at 1459 SpO2 89%.   At 1505 alert SpO2 81%. Contacted pt again and no answer. At 1507 SpO2 95%.

## 2022-09-12 ENCOUNTER — TELEPHONE (OUTPATIENT)
Dept: OTHER | Facility: MEDICAL CENTER | Age: 82
End: 2022-09-12
Payer: MEDICARE

## 2022-09-12 ENCOUNTER — TELEPHONE (OUTPATIENT)
Dept: MEDICAL GROUP | Facility: MEDICAL CENTER | Age: 82
End: 2022-09-12

## 2022-09-12 NOTE — TELEPHONE ENCOUNTER
RPM Notification: High Alert  Actions: None    Total time (minutes) spent communicating with patient: 5    1055 I called patient to let her know she alerted for low O2 at:        Patient Olive Alexandra SpO2 Very Low: 82 %  Sep 12, 2022 10:54    She did not answer the phone. I called her back at 1057 and she answered and stated she was fine they are swapping O2 tanks.     Will continue to monitor her.

## 2022-09-13 ENCOUNTER — TELEPHONE (OUTPATIENT)
Dept: OTHER | Facility: MEDICAL CENTER | Age: 82
End: 2022-09-13

## 2022-09-13 NOTE — TELEPHONE ENCOUNTER
RPM Notification: Disconnect  Actions: None    Total time (minutes) spent communicating with patient:     Patient disconnected from monitor around 8:42 I tried to help reconnect patient but was unsuccessful. I reached out to [facility] and they reached out to patient. Patient finally reconnected to monitor at 10:28    Will continue to monitor patient.

## 2022-09-14 ENCOUNTER — TELEPHONE (OUTPATIENT)
Dept: OTHER | Facility: MEDICAL CENTER | Age: 82
End: 2022-09-14

## 2022-09-14 ENCOUNTER — TELEMEDICINE (OUTPATIENT)
Dept: MEDICAL GROUP | Facility: MEDICAL CENTER | Age: 82
End: 2022-09-14
Payer: MEDICARE

## 2022-09-14 VITALS — OXYGEN SATURATION: 90 %

## 2022-09-14 VITALS — HEART RATE: 70 BPM | OXYGEN SATURATION: 92 %

## 2022-09-14 DIAGNOSIS — J96.01 ACUTE HYPOXEMIC RESPIRATORY FAILURE DUE TO COVID-19 (HCC): ICD-10-CM

## 2022-09-14 DIAGNOSIS — U07.1 ACUTE HYPOXEMIC RESPIRATORY FAILURE DUE TO COVID-19 (HCC): ICD-10-CM

## 2022-09-14 DIAGNOSIS — I27.20 SEVERE PULMONARY HYPERTENSION (HCC): ICD-10-CM

## 2022-09-14 DIAGNOSIS — I10 ESSENTIAL HYPERTENSION: Chronic | ICD-10-CM

## 2022-09-14 PROCEDURE — 99214 OFFICE O/P EST MOD 30 MIN: CPT | Mod: 95 | Performed by: INTERNAL MEDICINE

## 2022-09-14 ASSESSMENT — ENCOUNTER SYMPTOMS
SHORTNESS OF BREATH: 1
SORE THROAT: 0
FEVER: 0
CHILLS: 0
GASTROINTESTINAL NEGATIVE: 1

## 2022-09-14 NOTE — ASSESSMENT & PLAN NOTE
Continue oxygen supplementation with remote monitoring, keep oxygen level above 92%.  They are planning to buy portable oxygen concentrator and they might need to order for it.  Defer to pulmonology.  Continue Lasix as prescribed.

## 2022-09-14 NOTE — TELEPHONE ENCOUNTER
RPM Notification: Medium Alert  Actions: None    Total time (minutes) spent communicating with patient: 1    Pt alerted at 1357 with an SpO2 of 83%. I called the pt and spoke with her, she said her oxygen had become unplugged but was putting it back in right now. Pt SpO2 back at 90% by 1400.

## 2022-09-14 NOTE — PROGRESS NOTES
Subjective:     Diagnoses of Acute hypoxemic respiratory failure due to COVID-19 (HCC), Essential hypertension, and Severe pulmonary hypertension (HCC) were pertinent to this visit.    HPI: Olive is a pleasant 82 y.o. female who presents today for ER follow up.  I was experiencing significant technical difficulties during this virtual appointment, and was not able to access patient chart.  This visit was conducted via Zoom using secure and encrypted videoconferencing technology.   The patient was in their home in the state of [place].    The patient's identity was confirmed and verbal consent was obtained for this virtual visit.     Problem   Severe Pulmonary Hypertension (Hcc)    Diagnosed on echocardiogram, RVSP 68 mmHg.  Pending appointments with cardiology and pulmonology.     Acute Hypoxemic Respiratory Failure Due to Covid-19 (Hcc)    Tested positive for COVID-19 9/4/2022.  Was discharged home with remote monitoring.  Saturating 92%.  She was told, it insurance would not cover a portable concentrator earlier than 3 months.  So they are trying to bite out-of-pocket.  She has completed Decadron today, she is still taking furosemide 40 mg daily with potassium.     Essential Hypertension    Currently her blood pressure medications are on hold and she is being diuresed with Lasix 40 mg tablet daily.  Resume losartan after completed course of Lasix unless cardiology would like to continue it.       Past Medical History:   Diagnosis Date    Anemia     COVID-19     Depression     Hyperlipidemia     Hypertension     Obesity     Prediabetes     Uterine cancer (HCC)        Current Outpatient Medications Ordered in Epic   Medication Sig Dispense Refill    citalopram (CELEXA) 20 MG Tab Take 1 Tablet by mouth every day. 90 Tablet 3    pravastatin (PRAVACHOL) 20 MG Tab Take 1 Tablet by mouth at bedtime. 90 Tablet 3    azelastine (ASTELIN) 137 MCG/SPRAY nasal spray Administer 1 Spray into affected nostril(S) 2 times a day. 30  mL 1    Multiple Vitamins-Minerals (PRESERVISION AREDS 2) Cap       ursodiol (ACTIGALL) 300 MG Cap Take 300 mg by mouth 2 times a day.      ferrous sulfate 325 (65 Fe) MG tablet Take 1 Tablet by mouth 2 times a day. 180 Tablet 2    ascorbic acid (VITAMIN C) 100 MG tablet       vitamin D3, cholecalciferol, 400 UNIT Tab tablet Take 10 mcg by mouth every day.      B Complex-Folic Acid (B COMPLEX-VITAMIN B12 PO) Take 1 Tablet by mouth.      dexamethasone (DECADRON) 6 MG Tab Take 1 Tablet by mouth every day for 7 days. 7 Tablet 0    furosemide (LASIX) 40 MG Tab Take 1 Tablet by mouth every day for 30 days. 30 Tablet 0    potassium chloride SA (K-DUR) 10 MEQ Tab CR Take 1 Tablet by mouth every day for 30 days. 30 Each 0     No current [facility]-ordered facility-administered medications on file.     Review of Systems   Constitutional:  Positive for malaise/fatigue. Negative for chills and fever.   HENT:  Negative for sore throat.    Respiratory:  Positive for shortness of breath.    Cardiovascular:  Negative for chest pain.   Gastrointestinal: Negative.      Objective:     Exam:  Pulse 70   SpO2 92%  There is no height or weight on file to calculate BMI.    Physical Exam  Vitals reviewed: Limited exam due to virtual appointment..   Constitutional:       Appearance: Normal appearance.   HENT:      Nose:      Comments: Nasal cannula present  Pulmonary:      Effort: Pulmonary effort is normal.   Neurological:      Mental Status: She is alert and oriented to person, place, and time.   Psychiatric:         Mood and Affect: Mood normal.     Labs: Reviewed magnesium, BMP troponin and D-dimer from 9/4/2022.    Assessment & Plan:   Olive  is a pleasant 82 y.o. female with the following -     Problem List Items Addressed This Visit       Essential hypertension (Chronic)     Previously was taking losartan 100 mg tablet and amlodipine 5 mg tablet daily.  Currently on hold due to diuresis.          Acute hypoxemic respiratory failure due  to COVID-19 (HCC)     Continue oxygen supplementation with remote monitoring, keep oxygen level above 92%.  They are planning to buy portable oxygen concentrator and they might need to order for it.  Defer to pulmonology.  Continue Lasix as prescribed.         Severe pulmonary hypertension (HCC)     Follow-up with cardiology and pulmonology.          Return if symptoms worsen or fail to improve.    Please note that this dictation was created using voice recognition software. I have made every reasonable attempt to correct obvious errors, but I expect that there are errors of grammar and possibly content that I did not discover before finalizing the note.

## 2022-09-14 NOTE — ASSESSMENT & PLAN NOTE
Previously was taking losartan 100 mg tablet and amlodipine 5 mg tablet daily.  Currently on hold due to diuresis.

## 2022-09-16 ENCOUNTER — TELEPHONE (OUTPATIENT)
Dept: OTHER | Facility: MEDICAL CENTER | Age: 82
End: 2022-09-16
Payer: MEDICARE

## 2022-09-16 VITALS — HEART RATE: 64 BPM | OXYGEN SATURATION: 95 %

## 2022-09-16 NOTE — TELEPHONE ENCOUNTER
RPM Notification: High Alert  Actions: None    Total time (minutes) spent communicating with patient: 1 min.    Pt called back after leaving VM. Pt was moving, otherwise, saturation is went back up to 95%. No reports of SOB. Will continue to monitor.

## 2022-09-18 ENCOUNTER — TELEPHONE (OUTPATIENT)
Dept: OTHER | Facility: MEDICAL CENTER | Age: 82
End: 2022-09-18
Payer: MEDICARE

## 2022-09-18 VITALS — OXYGEN SATURATION: 84 %

## 2022-09-18 NOTE — TELEPHONE ENCOUNTER
RPM Notification: High Alert  Actions: None    Total time (minutes) spent communicating with patient: 0    Pt alerted with a low SpO2 of 84% at 0302. Call pt but she was unable to hear me. I texted to check on pt but did not get a response. Oxygen did increase back  to 93% right after.

## 2022-09-19 ENCOUNTER — TELEPHONE (OUTPATIENT)
Dept: OTHER | Facility: MEDICAL CENTER | Age: 82
End: 2022-09-19
Payer: MEDICARE

## 2022-09-19 VITALS — OXYGEN SATURATION: 91 % | HEART RATE: 80 BPM | RESPIRATION RATE: 17 BRPM

## 2022-09-19 NOTE — TELEPHONE ENCOUNTER
RPM Notification: Reconnect  Actions: None    Total time (minutes) spent communicating with patient: 1    Patient reconnected at 1332 w/ SpO2 of 91%.

## 2022-09-19 NOTE — TELEPHONE ENCOUNTER
RPM Notification: Disconnect  Actions: None    Total time (minutes) spent communicating with patient: 3    Pt became disconnected at  1303 . Contacted patient and patient's  reported pt was doing well. He checked her wristband and light was flashing red. He stated he just changed it today. I asked him to clean battery and sensor with an alcohol wipe. He stated they were not in their apt they were out having lunch and asked if they could do it in about 45 min when they come back. I let him know that was ok.

## 2022-09-21 ENCOUNTER — TELEPHONE (OUTPATIENT)
Dept: OTHER | Facility: MEDICAL CENTER | Age: 82
End: 2022-09-21
Payer: MEDICARE

## 2022-09-21 VITALS — HEART RATE: 80 BPM | RESPIRATION RATE: 26 BRPM | OXYGEN SATURATION: 87 %

## 2022-09-21 NOTE — TELEPHONE ENCOUNTER
RPM Notification: Disconnect  Actions: None    Total time (minutes) spent communicating with patient: 3    Pt called at 1015 to say her last finger sensor seemed like it was no longer working and that she was on her way to  new sensors. I told her I would let our respiratory team know she was on her way and they would meet her outside with the sensors. Pt called at 1030 to say she had gotten more finger sensors and would be replacing hers.

## 2022-09-22 ENCOUNTER — TELEMEDICINE (OUTPATIENT)
Dept: CARDIOLOGY | Facility: MEDICAL CENTER | Age: 82
End: 2022-09-22
Attending: GENERAL PRACTICE
Payer: MEDICARE

## 2022-09-22 VITALS
BODY MASS INDEX: 43.16 KG/M2 | WEIGHT: 275 LBS | OXYGEN SATURATION: 92 % | DIASTOLIC BLOOD PRESSURE: 59 MMHG | HEART RATE: 87 BPM | HEIGHT: 67 IN | SYSTOLIC BLOOD PRESSURE: 105 MMHG

## 2022-09-22 DIAGNOSIS — Q21.12 PATENT FORAMEN OVALE: ICD-10-CM

## 2022-09-22 DIAGNOSIS — I27.20 PULMONARY HTN (HCC): ICD-10-CM

## 2022-09-22 PROBLEM — Q24.8 INTERATRIAL CARDIAC SHUNT: Status: ACTIVE | Noted: 2022-09-05

## 2022-09-22 PROBLEM — Q24.8 INTERATRIAL CARDIAC SHUNT: Chronic | Status: ACTIVE | Noted: 2022-09-05

## 2022-09-22 PROCEDURE — 99203 OFFICE O/P NEW LOW 30 MIN: CPT | Performed by: INTERNAL MEDICINE

## 2022-09-22 ASSESSMENT — FIBROSIS 4 INDEX: FIB4 SCORE: 1.05

## 2022-09-22 NOTE — TELEPHONE ENCOUNTER
RPM Notification: Patient Communication  Actions: None    Total time (minutes) spent communicating with patient: 10 mins    Pt called at 2113 that she was locked out of emil, luba came over and helped me and advised them how to reset password at 2124 they got back in and they got reconnected  [id number]

## 2022-09-22 NOTE — TELEPHONE ENCOUNTER
RPM Notification: Disconnect and Patient Communication  Actions: None    Total time (minutes) spent communicating with patient: 30 sec    Called pt to inform them that they got disconnected, they informed me that their battery changed and they'll change it

## 2022-09-23 ENCOUNTER — TELEPHONE (OUTPATIENT)
Dept: OTHER | Facility: MEDICAL CENTER | Age: 82
End: 2022-09-23

## 2022-09-23 VITALS — OXYGEN SATURATION: 93 %

## 2022-09-23 NOTE — TELEPHONE ENCOUNTER
RPM Notification: High Alert  Actions: None    Total time (minutes) spent communicating with patient: 1    Pt alerted at 758 with SpO2 of 81%. I called and spoke with the pt, she said she had taken her oxygen off to do something and forgot to put it back on. She put it back on and her SpO2 was back up to 93% by 801.

## 2022-09-24 ENCOUNTER — TELEPHONE (OUTPATIENT)
Dept: OTHER | Facility: MEDICAL CENTER | Age: 82
End: 2022-09-24
Payer: MEDICARE

## 2022-09-24 VITALS — HEART RATE: 78 BPM | OXYGEN SATURATION: 97 % | RESPIRATION RATE: 11 BRPM

## 2022-09-24 NOTE — TELEPHONE ENCOUNTER
RPM Notification: Disconnect  Actions: None    Total time (minutes) spent communicating with patient: 2    Pt became disconnected at  0953 . Contacted patient and patient reported she was doing well. She is unsure why she is disconnected because her light is solid blue. I had her log into [employer] [facility] and they were able to see their live numbers. I asked pt to turn phone off and turn it back on after a minute. They did and patient reconnected. Patient reconnected at 0958 with SpO2 of 97%.

## 2022-09-25 ENCOUNTER — TELEPHONE (OUTPATIENT)
Dept: OTHER | Facility: MEDICAL CENTER | Age: 82
End: 2022-09-25
Payer: MEDICARE

## 2022-09-25 NOTE — TELEPHONE ENCOUNTER
RPM Notification: High Alert  Actions: None    Total time (minutes) spent communicating with patient:     Patient alerted for low O2:    Patient Olive Alexandra SpO2 Very Low: 84 % Sep 25, 2022 11:18     He oxygen went right back up to 92%. Will continue to monitor.

## 2022-09-25 NOTE — TELEPHONE ENCOUNTER
RPM Notification: High Alert  Actions: None    Total time (minutes) spent communicating with patient: 2    Patient alerted for low O2:         Patient Olive Alexandra SpO2 Very Low: 83 %  Sep 25, 2022 10:45    She said she is doing well she is eating breakfast right now. Will continue to monitor and call her back if she alerts.

## 2022-09-25 NOTE — TELEPHONE ENCOUNTER
RPM Notification: Medium Alert  Actions: None    Total time (minutes) spent communicating with patient: 0    Patient alerted for low O2 at:     Patient Olive Alexandra SpO2 Low: 87 %Sep 25, 2022 11:06     Patient did not answer when I called but her oxygen did go back up to 91%. Will continue to monitor and call her back if she alerts.

## 2022-09-25 NOTE — TELEPHONE ENCOUNTER
RPM Notification: High Alert  Actions: None    Total time (minutes) spent communicating with patient: 5      Called patient to let her know she alerted for low O2 at:     Patient Olive Alexandra SpO2 Very Low: 84 %Sep 25, 2022 10:07       She did not answer the phone. I did leave a message and she called em right back. She said she is doing good with no problems. Will continue to monitor her and call her back if she alerts again.

## 2022-09-26 ENCOUNTER — TELEPHONE (OUTPATIENT)
Dept: OTHER | Facility: MEDICAL CENTER | Age: 82
End: 2022-09-26

## 2022-09-26 ENCOUNTER — TELEPHONE (OUTPATIENT)
Dept: OTHER | Facility: MEDICAL CENTER | Age: 82
End: 2022-09-26
Payer: MEDICARE

## 2022-09-26 VITALS — OXYGEN SATURATION: 87 %

## 2022-09-26 VITALS — OXYGEN SATURATION: 82 %

## 2022-09-26 VITALS — OXYGEN SATURATION: 86 %

## 2022-09-26 NOTE — TELEPHONE ENCOUNTER
RPM Notification: Medium Alert  Actions: None    Total time (minutes) spent communicating with patient: 1    Pt alerted with a low SpO2 of 86% at 0258. Spoke with pt, oxygen mask kept falling off while sleeping and will adjust it. O2 went back up to normal range.

## 2022-09-26 NOTE — TELEPHONE ENCOUNTER
RPM Notification: Medium Alert  Actions: None    Total time (minutes) spent communicating with patient: 1    Pt alerted with a low SpO2 of 87% at 0035. Spoke with pt, she is doing fine was just sleeping. Pt's oxygen did increase back up to normal range before getting off the phone with patient.

## 2022-09-26 NOTE — TELEPHONE ENCOUNTER
RPM Notification: Medium Alert  Actions: None    Total time (minutes) spent communicating with patient: 1      SpO2 Low: 85 % at 0150  SpO2 Low: 87 % at 0200    First call made to pt went unanswered, she did call back but had difficulty hearing patient. I did also text pt informing her to make sure her oxygen is on securely. Pt's O2 level did increase back up to normal range currently at 93%. Will continue to monitor.

## 2022-09-26 NOTE — TELEPHONE ENCOUNTER
RPM Notification: Medium Alert  Actions: None    Total time (minutes) spent communicating with patient: 1       Pt alerted at 0450 with 82% SpO2. Called pt to check on her and didn't answer. Pt called me back after a minute and stated that she's okay and will put oxygen back on. Pt SpO2 at 0454 is 94%.

## 2022-09-27 ENCOUNTER — TELEPHONE (OUTPATIENT)
Dept: OTHER | Facility: MEDICAL CENTER | Age: 82
End: 2022-09-27

## 2022-09-27 ENCOUNTER — TELEMEDICINE (OUTPATIENT)
Dept: MEDICAL GROUP | Facility: MEDICAL CENTER | Age: 82
End: 2022-09-27
Payer: MEDICARE

## 2022-09-27 VITALS — SYSTOLIC BLOOD PRESSURE: 125 MMHG | HEART RATE: 87 BPM | OXYGEN SATURATION: 94 % | DIASTOLIC BLOOD PRESSURE: 75 MMHG

## 2022-09-27 VITALS — RESPIRATION RATE: 15 BRPM | OXYGEN SATURATION: 90 % | HEART RATE: 75 BPM

## 2022-09-27 VITALS — HEART RATE: 91 BPM | OXYGEN SATURATION: 84 % | RESPIRATION RATE: 13 BRPM

## 2022-09-27 VITALS — HEART RATE: 85 BPM | OXYGEN SATURATION: 92 % | RESPIRATION RATE: 14 BRPM

## 2022-09-27 VITALS — HEART RATE: 84 BPM | RESPIRATION RATE: 18 BRPM | OXYGEN SATURATION: 92 %

## 2022-09-27 DIAGNOSIS — L89.202 PRESSURE INJURY OF THIGH, STAGE 2, UNSPECIFIED LATERALITY (HCC): ICD-10-CM

## 2022-09-27 DIAGNOSIS — L98.9 SKIN LESION: ICD-10-CM

## 2022-09-27 PROCEDURE — 99214 OFFICE O/P EST MOD 30 MIN: CPT | Mod: 95 | Performed by: INTERNAL MEDICINE

## 2022-09-27 ASSESSMENT — ENCOUNTER SYMPTOMS
CHILLS: 0
FEVER: 0

## 2022-09-27 NOTE — TELEPHONE ENCOUNTER
RPM Notification: High Alert  Actions: None    Total time (minutes) spent communicating with patient: 2,      Patient alerted at 0924 with SpO2 of 8%. Gave the patient a call and she said she was doing good, and not breathing through her nose. She read her stats from her end. I asked her to take a few deep breaths and let her know id keep monitoring and watching for it to go back up. She told me before we hung up that her end said 93%. At 0933 her SpO2 was back up to 92%.

## 2022-09-27 NOTE — PROGRESS NOTES
"Subjective:     Chief Complaint   Patient presents with    Other     Wound consult      Diagnoses of Skin lesion and Pressure injury of thigh, stage 2, unspecified laterality (HCC) were pertinent to this visit.  HPI: Olive is a pleasant 82 y.o. female who presents today for evaluation of new skin lesions.    This visit was conducted via Zoom using secure and encrypted videoconferencing technology.   The patient was in their home in the [facility].    The patient's identity was confirmed and verbal consent was obtained for this virtual visit.     Problem   Skin Lesion    Since this is a video appointment, the description of the lesions was provided by patient verbally, considering the sensitive location and for patient's privacy.  Patient has total 2 skin lesions approximately 1 inch in diameter each, located on the medial surface of both thighs, closer to the groin area.  Patient describes the lesions as \" blood blisters\", that are closed.  She denies surrounding erythema, foul-smelling discharge or pus.  She tells me this specific locations experience a lot of pressure while using the transfer board and using scooter.  She denies fever, chills, other signs of infection.        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 30 Tablet 0    potassium chloride SA (K-DUR) 10 MEQ Tab CR Take 1 Tablet by mouth every day for 30 days. 30 Each 0    citalopram (CELEXA) 20 MG " Tab Take 1 Tablet by mouth every day. 90 Tablet 3    pravastatin (PRAVACHOL) 20 MG Tab Take 1 Tablet by mouth at bedtime. 90 Tablet 3    azelastine (ASTELIN) 137 MCG/SPRAY nasal spray Administer 1 Spray into affected nostril(S) 2 times a day. 30 mL 1    Multiple Vitamins-Minerals (PRESERVISION AREDS 2) Cap       ursodiol (ACTIGALL) 300 MG Cap Take 300 mg by mouth 2 times a day.      ferrous sulfate 325 (65 Fe) MG tablet Take 1 Tablet by mouth 2 times a day. 180 Tablet 2    ascorbic acid (VITAMIN C) 100 MG tablet       vitamin D3, cholecalciferol, 400 UNIT Tab tablet Take 10 mcg by mouth every day.      B Complex-Folic Acid (B COMPLEX-VITAMIN B12 PO) Take 1 Tablet by mouth.       No current Epic-ordered facility-administered medications on file.     Review of Systems   Constitutional:  Negative for chills and fever.   Genitourinary:         Positive for incontinence   Skin:  Positive for rash.     Objective:     Exam:  /75   Pulse 87   SpO2 94%  There is no height or weight on file to calculate BMI.    Physical Exam  Vitals reviewed: Limited exam due to virtual appointment.   Constitutional:       Appearance: Normal appearance.   HENT:      Head: Normocephalic and atraumatic.   Pulmonary:      Effort: Pulmonary effort is normal.      Comments: Nasal cannula in situ   Neurological:      Mental Status: She is alert.     Assessment & Plan:   Olive  is a pleasant 82 y.o. female with the following -     Problem List Items Addressed This Visit                   Skin lesion  Pressure injury of thigh, stage 2 (HCC)     Patient with significantly impaired mobility, complaining of 2 new skin lesions, that have been present for over 2 weeks now.  Suspicious for stage II pressure ulcers.  Patient is not able to present to clinic for evaluation, as it is extremely painful for her to get in and out of the car.  She provided description of her wounds and no physical evaluation was obtained, due to virtual appointment and  lesions location in the private areas.   -Placed an urgent referral to home health for wound care evaluation as patient is currently homebound   - Counseled patient on general care, frequent repositioning, monitoring for signs of infections, nutrition, local skin care.   -Wound care to evaluate and establish plan of treatment   - Patient will let me know if she develops any signs of infection.    - At this time patient does not have any clinical signs of infection and treatment with antibiotics is not warranted.         Relevant Orders    Referral to Home Health     Return if symptoms worsen or fail to improve.    Please note that this dictation was created using voice recognition software. I have made every reasonable attempt to correct obvious errors, but I expect that there are errors of grammar and possibly content that I did not discover before finalizing the note.

## 2022-09-27 NOTE — TELEPHONE ENCOUNTER
RPM Notification: High Alert  Actions: None    Total time (minutes) spent communicating with patient: 2    At  0807 low SPo2 alert of 81%. Contacted patient. Patient states she is doing well her oxygen fell off her nose. She stated she was currently at 3L but sometimes she can do 2L depends on the day. Patient put on oxygen. Patient SPo2 increased to 90%.

## 2022-09-27 NOTE — TELEPHONE ENCOUNTER
RPM Notification: High Alert  Actions: None    Total time (minutes) spent communicating with patient: 1,      Patient alerted at 1259 with SpO2 of 81%.Gave her a call and she knew it was low and was working on getting it back up. At 1303 her SpO2 was back up to 92%.

## 2022-09-27 NOTE — TELEPHONE ENCOUNTER
RPM Notification: High Alert  Actions: None    Total time (minutes) spent communicating with patient: 1      Patient alerted at 1355 with SpO2 of 83%. Gave the patient a call. She said shes been trying to keep her oxygen up but has been having issues. She said she feels fine and she is wearing her oxygen. She stated she was going over to her husbands to see if he could help. So I told her id give her some time to get over there and see if they can fix the issue. At 1358 her SpO2 was back up to 92%.

## 2022-09-27 NOTE — ASSESSMENT & PLAN NOTE
Patient with significantly impaired mobility (ambulating rescored her only), complaining of 2 new skin lesions, that have been present for over 2 weeks now.  Suspicious for stage II pressure ulcers.  This is a new problem with uncertain prognosis.    Patient is not able to present to clinic for evaluation, as it is extremely painful for her to get in and out of the car.  She provided description of her wounds and no physical evaluation was obtained, due to virtual appointment and lesions location in the private areas.   -Placed an urgent referral to home health for wound care evaluation as patient is currently homebound   - Counseled patient on general care, frequent repositioning, monitoring for signs of infections, nutrition, local skin care.   -Wound care to evaluate and establish plan of treatment   - Patient will let me know if she develops any signs of infection.    - At this time patient does not have any clinical signs of infection and treatment with antibiotics is not warranted.

## 2022-09-29 ENCOUNTER — TELEPHONE (OUTPATIENT)
Dept: OTHER | Facility: MEDICAL CENTER | Age: 82
End: 2022-09-29
Payer: MEDICARE

## 2022-09-29 VITALS — OXYGEN SATURATION: 84 %

## 2022-09-29 VITALS — RESPIRATION RATE: 20 BRPM | HEART RATE: 89 BPM | OXYGEN SATURATION: 89 %

## 2022-09-29 DIAGNOSIS — E61.1 IRON DEFICIENCY: ICD-10-CM

## 2022-09-29 DIAGNOSIS — E78.2 MIXED HYPERLIPIDEMIA: ICD-10-CM

## 2022-09-29 RX ORDER — FERROUS SULFATE 325(65) MG
325 TABLET ORAL 2 TIMES DAILY
Qty: 180 TABLET | Refills: 2 | Status: SHIPPED | OUTPATIENT
Start: 2022-09-29 | End: 2023-01-01

## 2022-09-29 RX ORDER — PRAVASTATIN SODIUM 20 MG
20 TABLET ORAL
Qty: 90 TABLET | Refills: 3 | Status: SHIPPED | OUTPATIENT
Start: 2022-09-29 | End: 2023-01-01 | Stop reason: SDUPTHER

## 2022-09-29 NOTE — TELEPHONE ENCOUNTER
Received request via: Patient    Was the patient seen in the last year in this department? Yes    Does the patient have an active prescription (recently filled or refills available) for medication(s) requested? No     Requested Prescriptions     Pending Prescriptions Disp Refills    pravastatin (PRAVACHOL) 20 MG Tab 90 Tablet 3     Sig: Take 1 Tablet by mouth at bedtime.    ferrous sulfate 325 (65 Fe) MG tablet 180 Tablet 2     Sig: Take 1 Tablet by mouth 2 times a day.

## 2022-09-29 NOTE — TELEPHONE ENCOUNTER
RPM Notification: High Alert  Actions: None    Total time (minutes) spent communicating with patient: 2    At  0826 low SPo2 alert of 84%. Contacted patient. Patient states she is doing well she was talking and forgetting to breath. Patient SPo2 increased to 89%.

## 2022-09-29 NOTE — TELEPHONE ENCOUNTER
RPM Notification: High Alert  Actions: None    Total time (minutes) spent communicating with patient: 1    Pt alerted with a low SpO2 of 84% at 0531. Spoke with pt, she's doing fine had just woken up and hose was not in securely. Pt adjusted it and oxygen increase up to normal range right away.

## 2022-10-01 ENCOUNTER — TELEPHONE (OUTPATIENT)
Dept: OTHER | Facility: MEDICAL CENTER | Age: 82
End: 2022-10-01
Payer: MEDICARE

## 2022-10-01 VITALS — RESPIRATION RATE: 16 BRPM | OXYGEN SATURATION: 84 % | HEART RATE: 93 BPM

## 2022-10-01 VITALS — OXYGEN SATURATION: 84 % | RESPIRATION RATE: 17 BRPM | HEART RATE: 88 BPM

## 2022-10-01 NOTE — TELEPHONE ENCOUNTER
RPM Notification: High Alert  Actions: None    Total time (minutes) spent communicating with patient: 1    At 1005 alert SpO2 84%. Contacted pt. Pt advised me that she is okay. She advised me that she is eating and will get her oxygen back up. At 1007 SpO2 90%.

## 2022-10-01 NOTE — TELEPHONE ENCOUNTER
RPM Notification: High Alert  Actions: None    Total time (minutes) spent communicating with patient: 1    At 0852 alert SpO2 84%. Contacted pt. Pt advised me that she is fine. She stated that she is out and in an elevator currently. Pt advised me that her oxygen is on and on 2.5L. At 0854 SpO2 91%.

## 2022-10-02 ENCOUNTER — TELEPHONE (OUTPATIENT)
Dept: OTHER | Facility: MEDICAL CENTER | Age: 82
End: 2022-10-02
Payer: MEDICARE

## 2022-10-02 VITALS — RESPIRATION RATE: 18 BRPM | HEART RATE: 89 BPM | OXYGEN SATURATION: 84 %

## 2022-10-02 NOTE — TELEPHONE ENCOUNTER
RPM Notification: High Alert  Actions: None    Total time (minutes) spent communicating with patient: 0    At 1131 alert SpO2 84%. Did not contact pt since a minute later pt oxygen returned right back up within normal range. At 1132 SpO2 93%.

## 2022-10-03 ENCOUNTER — TELEPHONE (OUTPATIENT)
Dept: OTHER | Facility: MEDICAL CENTER | Age: 82
End: 2022-10-03

## 2022-10-03 ENCOUNTER — APPOINTMENT (OUTPATIENT)
Dept: SLEEP MEDICINE | Facility: MEDICAL CENTER | Age: 82
End: 2022-10-03
Payer: MEDICARE

## 2022-10-03 ENCOUNTER — TELEPHONE (OUTPATIENT)
Dept: OTHER | Facility: MEDICAL CENTER | Age: 82
End: 2022-10-03
Payer: MEDICARE

## 2022-10-03 VITALS — HEART RATE: 84 BPM | OXYGEN SATURATION: 94 % | RESPIRATION RATE: 19 BRPM

## 2022-10-03 DIAGNOSIS — F32.A MILD DEPRESSION: ICD-10-CM

## 2022-10-03 NOTE — TELEPHONE ENCOUNTER
RPM Notification: Disconnect  Actions: None    Total time (minutes) spent communicating with patient: 1    Pt disconnected at 1605. I called and spoke with the pt, she said the light on her monitor was blinking and that she would get it reconnected. Pt reconnected at 1607.

## 2022-10-03 NOTE — TELEPHONE ENCOUNTER
RPM Notification: High Alert and Patient Communication  Actions: None    Total time (minutes) spent communicating with patient: 1 min    Alerted at  82%   Called Olive. She stated she took the oxygen off, and forgot to place it back on.She is currently on 3L and back up to 94%. She did state that she was going to try and work on moving oxygen down, but logan let us know. I let her know I would call her back if she alerted again .

## 2022-10-04 ENCOUNTER — TELEPHONE (OUTPATIENT)
Dept: OTHER | Facility: MEDICAL CENTER | Age: 82
End: 2022-10-04
Payer: MEDICARE

## 2022-10-04 VITALS — RESPIRATION RATE: 15 BRPM | OXYGEN SATURATION: 89 % | HEART RATE: 74 BPM

## 2022-10-04 NOTE — TELEPHONE ENCOUNTER
RPM Notification: High Alert  Actions: None    Total time (minutes) spent communicating with patient: 2      At  0747 low SPo2 alert of 84%. Contacted patient. Patient states she is doing well her hose was kinked. She fixed it and her oxygen went up. Patient SPo2 increased to 89%.

## 2022-10-04 NOTE — PROGRESS NOTES
Attempted to call patient 4 times between 1600 and 1700.  The phone went immediately to voicemail, I left a message for patient to return my call, however did not hear back from her.    Flori Maza, DO   Pulmonary and Critical Care

## 2022-10-05 ENCOUNTER — TELEPHONE (OUTPATIENT)
Dept: OTHER | Facility: MEDICAL CENTER | Age: 82
End: 2022-10-05
Payer: MEDICARE

## 2022-10-05 VITALS — OXYGEN SATURATION: 94 % | HEART RATE: 72 BPM | RESPIRATION RATE: 18 BRPM

## 2022-10-05 NOTE — TELEPHONE ENCOUNTER
RPM Notification: Patient Communication  Actions: None    Total time (minutes) spent communicating with patient: 1 min    Olive called to get  more batteries. She asked for 10. We let her know that we could give her a couple, and we can go from there. She is going to go to the front of the ER at  [facility].     RT notified and will bring batteries down to her

## 2022-10-06 ENCOUNTER — TELEPHONE (OUTPATIENT)
Dept: OTHER | Facility: MEDICAL CENTER | Age: 82
End: 2022-10-06
Payer: MEDICARE

## 2022-10-06 VITALS — HEART RATE: 93 BPM | RESPIRATION RATE: 24 BRPM | OXYGEN SATURATION: 93 %

## 2022-10-06 NOTE — TELEPHONE ENCOUNTER
RPM Notification: High Alert and Patient Communication  Actions: None    Total time (minutes) spent communicating with patient: 1 min    Alerted at 83% 0836  Called Olive to let her know she is alerting. She stated she is ok and will place oxygen back on. She took it off for a short time. She was able to get back up to 90's. She is currently on 2.5L

## 2022-10-08 ENCOUNTER — TELEPHONE (OUTPATIENT)
Dept: OTHER | Facility: MEDICAL CENTER | Age: 82
End: 2022-10-08
Payer: MEDICARE

## 2022-10-08 NOTE — TELEPHONE ENCOUNTER
RPM Notification: Disconnect  Actions: None    Total time (minutes) spent communicating with patient: 2    Pt disconnected at 0914. I called and spoke with the pt who said she would check her phone and asked me to stay on the line. She said it looked like her bluetooth had gotten turned off and that she would turn it back on. Pt reconnected at 0917.

## 2022-10-10 ENCOUNTER — TELEPHONE (OUTPATIENT)
Dept: OTHER | Facility: MEDICAL CENTER | Age: 82
End: 2022-10-10
Payer: MEDICARE

## 2022-10-10 NOTE — TELEPHONE ENCOUNTER
RPM Notification: Disconnect  Actions: None    Total time (minutes) spent communicating with patient: 1        At 10:40 patient disconnected called patient, patient said she just realized she disconnected from the emil and that she will try and reconnect and that if she has any issues she will ask her  to help her reconnect

## 2022-10-11 ENCOUNTER — TELEPHONE (OUTPATIENT)
Dept: OTHER | Facility: MEDICAL CENTER | Age: 82
End: 2022-10-11
Payer: MEDICARE

## 2022-10-11 VITALS — RESPIRATION RATE: 23 BRPM | OXYGEN SATURATION: 85 % | HEART RATE: 79 BPM

## 2022-10-12 ENCOUNTER — TELEPHONE (OUTPATIENT)
Dept: OTHER | Facility: MEDICAL CENTER | Age: 82
End: 2022-10-12

## 2022-10-12 VITALS — RESPIRATION RATE: 17 BRPM | OXYGEN SATURATION: 98 % | HEART RATE: 73 BPM

## 2022-10-12 VITALS — OXYGEN SATURATION: 92 % | HEART RATE: 85 BPM | RESPIRATION RATE: 27 BRPM

## 2022-10-12 NOTE — TELEPHONE ENCOUNTER
RPM Notification: High Alert  Actions: None    Total time (minutes) spent communicating with patient: 2    Contacted patient  at 1153 dut to SpO2 being 81%. SpO2 remained low throughout the call. Patient and  were checking for kinks in the oxygen tubing, stated that they fixed and the SpO2 went up to 94%. Patient said that she is still on 2.5L.

## 2022-10-12 NOTE — TELEPHONE ENCOUNTER
RPM Notification: Medium Alert  Actions: None    Total time (minutes) spent communicating with patient: N/A      Called patient, no answer. She did come up to 96% before second call was made.

## 2022-10-13 NOTE — TELEPHONE ENCOUNTER
RPM Notification: Patient Communication  Actions: None    Total time (minutes) spent communicating with patient: 2    Called patient to inform her of her next appointment on 10/19. She requested to be contacted early in the morning. I went ahead and informed my supervisor that between [phone] would be good to contact her for the doctor.

## 2022-10-15 ENCOUNTER — TELEPHONE (OUTPATIENT)
Dept: OTHER | Facility: MEDICAL CENTER | Age: 82
End: 2022-10-15
Payer: MEDICARE

## 2022-10-15 VITALS — HEART RATE: 80 BPM | RESPIRATION RATE: 15 BRPM | OXYGEN SATURATION: 95 %

## 2022-10-15 NOTE — TELEPHONE ENCOUNTER
RPM Notification: Medium Alert and Patient Communication  Actions: None    Total time (minutes) spent communicating with patient: 2 mins    Pt alerted at 83% called twice no response pt called right back stating shes fine and hoesnt know why her oxygen go so low

## 2022-10-16 ENCOUNTER — TELEPHONE (OUTPATIENT)
Dept: OTHER | Facility: MEDICAL CENTER | Age: 82
End: 2022-10-16
Payer: MEDICARE

## 2022-10-16 VITALS — OXYGEN SATURATION: 83 %

## 2022-10-16 NOTE — TELEPHONE ENCOUNTER
RPM Notification: Medium Alert  Actions: None    Total time (minutes) spent communicating with patient: 1     Pt alerted at 1413 with SpO2 of 83%. Called pt and she said that she can see that she is low and will work on it.    At 1414, pt SpO2 went back up to 92%.

## 2022-10-18 ENCOUNTER — HOSPITAL ENCOUNTER (OUTPATIENT)
Dept: LAB | Facility: MEDICAL CENTER | Age: 82
End: 2022-10-18
Attending: INTERNAL MEDICINE
Payer: MEDICARE

## 2022-10-18 ENCOUNTER — TELEPHONE (OUTPATIENT)
Dept: OTHER | Facility: MEDICAL CENTER | Age: 82
End: 2022-10-18
Payer: MEDICARE

## 2022-10-18 LAB
ALBUMIN SERPL BCP-MCNC: 3.5 G/DL (ref 3.2–4.9)
ALBUMIN/GLOB SERPL: 1.2 G/DL
ALP SERPL-CCNC: 104 U/L (ref 30–99)
ALT SERPL-CCNC: 9 U/L (ref 2–50)
ANION GAP SERPL CALC-SCNC: 9 MMOL/L (ref 7–16)
AST SERPL-CCNC: 13 U/L (ref 12–45)
BASOPHILS # BLD AUTO: 0.6 % (ref 0–1.8)
BASOPHILS # BLD: 0.04 K/UL (ref 0–0.12)
BILIRUB SERPL-MCNC: 0.4 MG/DL (ref 0.1–1.5)
BUN SERPL-MCNC: 12 MG/DL (ref 8–22)
CALCIUM SERPL-MCNC: 9.8 MG/DL (ref 8.5–10.5)
CHLORIDE SERPL-SCNC: 99 MMOL/L (ref 96–112)
CO2 SERPL-SCNC: 32 MMOL/L (ref 20–33)
CREAT SERPL-MCNC: 0.39 MG/DL (ref 0.5–1.4)
EOSINOPHIL # BLD AUTO: 0.11 K/UL (ref 0–0.51)
EOSINOPHIL NFR BLD: 1.7 % (ref 0–6.9)
ERYTHROCYTE [DISTWIDTH] IN BLOOD BY AUTOMATED COUNT: 56.2 FL (ref 35.9–50)
GFR SERPLBLD CREATININE-BSD FMLA CKD-EPI: 100 ML/MIN/1.73 M 2
GLOBULIN SER CALC-MCNC: 3 G/DL (ref 1.9–3.5)
GLUCOSE SERPL-MCNC: 97 MG/DL (ref 65–99)
HCT VFR BLD AUTO: 42.9 % (ref 37–47)
HGB BLD-MCNC: 13.1 G/DL (ref 12–16)
IMM GRANULOCYTES # BLD AUTO: 0.03 K/UL (ref 0–0.11)
IMM GRANULOCYTES NFR BLD AUTO: 0.5 % (ref 0–0.9)
LYMPHOCYTES # BLD AUTO: 0.42 K/UL (ref 1–4.8)
LYMPHOCYTES NFR BLD: 6.6 % (ref 22–41)
MCH RBC QN AUTO: 28.8 PG (ref 27–33)
MCHC RBC AUTO-ENTMCNC: 30.5 G/DL (ref 33.6–35)
MCV RBC AUTO: 94.3 FL (ref 81.4–97.8)
MONOCYTES # BLD AUTO: 0.44 K/UL (ref 0–0.85)
MONOCYTES NFR BLD AUTO: 6.9 % (ref 0–13.4)
NEUTROPHILS # BLD AUTO: 5.32 K/UL (ref 2–7.15)
NEUTROPHILS NFR BLD: 83.7 % (ref 44–72)
NRBC # BLD AUTO: 0 K/UL
NRBC BLD-RTO: 0 /100 WBC
PLATELET # BLD AUTO: 252 K/UL (ref 164–446)
PMV BLD AUTO: 11 FL (ref 9–12.9)
POTASSIUM SERPL-SCNC: 4.9 MMOL/L (ref 3.6–5.5)
PROT SERPL-MCNC: 6.5 G/DL (ref 6–8.2)
RBC # BLD AUTO: 4.55 M/UL (ref 4.2–5.4)
SODIUM SERPL-SCNC: 140 MMOL/L (ref 135–145)
WBC # BLD AUTO: 6.4 K/UL (ref 4.8–10.8)

## 2022-10-18 PROCEDURE — 36415 COLL VENOUS BLD VENIPUNCTURE: CPT

## 2022-10-18 PROCEDURE — 85025 COMPLETE CBC W/AUTO DIFF WBC: CPT

## 2022-10-18 PROCEDURE — 80053 COMPREHEN METABOLIC PANEL: CPT

## 2022-10-18 NOTE — TELEPHONE ENCOUNTER
RPM Notification: Disconnect, Reconnect, and Patient Communication  Actions: None    Total time (minutes) spent communicating with patient: 1 min    Called to let Olive know she was disconnected. No answer.Left message     Called Olive back. She answered. She let me know she was driving and it was going to be a min before she could reconnect.   0918 reconnected 90 pulse 99 O2

## 2022-10-21 ENCOUNTER — TELEPHONE (OUTPATIENT)
Dept: OTHER | Facility: MEDICAL CENTER | Age: 82
End: 2022-10-21
Payer: MEDICARE

## 2022-10-21 VITALS — HEART RATE: 82 BPM | OXYGEN SATURATION: 93 % | RESPIRATION RATE: 19 BRPM

## 2022-10-21 VITALS — HEART RATE: 88 BPM | OXYGEN SATURATION: 84 % | RESPIRATION RATE: 17 BRPM

## 2022-10-21 NOTE — TELEPHONE ENCOUNTER
RPM Notification: High Alert  Actions: None    Total time (minutes) spent communicating with patient: 1    At 1050 alert SpO2 84%. Contacted pt. Pt advised me that she is fine. I let her know about her oxygen being at 84%. Pt stated for me to hold for a second while she checks, then stated that everything should be fine now. At 1054 SpO2 96%.

## 2022-10-21 NOTE — TELEPHONE ENCOUNTER
RPM Notification: Patient Communication  Actions: None    Total time (minutes) spent communicating with patient: 1    At 1533 Contacted pt. Pt advised me that she is doing fine. I let her know that our call was to inform her of her next appointment. Stated that her appointment with Dr. Thakkar today, that Ariane would like to continue monitoring her. Stated that her follow-up telephone appointment is on 10/27 with Dr. Rosen. Pt stated that is fine and if I could pass along a message about the best times to reach her for the appointment. Pt advised that it is best to get a hold of her either before 1000 or after 1530.

## 2022-10-22 NOTE — TELEPHONE ENCOUNTER
RPM Notification: Disconnect  Actions: None    Total time (minutes) spent communicating with patient: 1    Called pt at 2202 due to disconnect. Pt stated she'll fix it and was able to reconnect right away without any issue. Will continue to monitor.

## 2022-10-23 ENCOUNTER — TELEPHONE (OUTPATIENT)
Dept: OTHER | Facility: MEDICAL CENTER | Age: 82
End: 2022-10-23
Payer: MEDICARE

## 2022-10-23 NOTE — TELEPHONE ENCOUNTER
RPM Notification: Disconnect  Actions: None    Total time (minutes) spent communicating with patient: 5      Patient last reading was at 0751 so I called the patient to let her know she was disconnected. Her phone went straight to [employer]. I then called Jared her  who had said they just did an update on the phone and will get her reconnected. Olive said she was doing well with no problems. Will look for her to reconnect.

## 2022-10-24 ENCOUNTER — TELEPHONE (OUTPATIENT)
Dept: OTHER | Facility: MEDICAL CENTER | Age: 82
End: 2022-10-24
Payer: MEDICARE

## 2022-10-24 VITALS — HEART RATE: 81 BPM | RESPIRATION RATE: 11 BRPM | OXYGEN SATURATION: 94 %

## 2022-10-24 VITALS — RESPIRATION RATE: 24 BRPM | HEART RATE: 75 BPM | OXYGEN SATURATION: 91 %

## 2022-10-24 NOTE — TELEPHONE ENCOUNTER
RPM Notification: High Alert and Patient Communication  Actions: None    Total time (minutes) spent communicating with patient: 1 min    Alerted at 1454 86%  Called Olive. She stated she is ok. She is at a [employer] game. She stated she is working on getting her oxygen up. She is currently on 2.5L. she was able to go back up to 90's

## 2022-10-24 NOTE — TELEPHONE ENCOUNTER
RPM Notification: Patient Communication  Actions: None    Total time (minutes) spent communicating with patient: 2 min  Olive needs more batteries. They called to let use know that they were in front of the ER. They asked for 10 batteries. I informed them that was a lot, that we dont normally give out that many. They have X3 days until next appointment. I reached out to RT to please take  X4 batteries down to [employer].  I let Olive know that we could give 4 batteries, as their appiontment is in 3 days. They asked if the could get 5. I let them know giving them 4, is giving them 1 extra

## 2022-10-27 ENCOUNTER — TELEPHONE (OUTPATIENT)
Dept: OTHER | Facility: MEDICAL CENTER | Age: 82
End: 2022-10-27
Payer: MEDICARE

## 2022-10-27 VITALS — OXYGEN SATURATION: 87 %

## 2022-10-27 NOTE — TELEPHONE ENCOUNTER
RPM Notification: Medium Alert  Actions: None    Total time (minutes) spent communicating with patient: 1    Pt alerted with SpO2 of 87% at 1116. I called and spoke with the pt who said she was feeling fine but that she would take some deep breaths to get her oxygen back up. Pt SpO2 back at 92% at 1118.

## 2022-10-31 ENCOUNTER — TELEPHONE (OUTPATIENT)
Dept: OTHER | Facility: MEDICAL CENTER | Age: 82
End: 2022-10-31
Payer: MEDICARE

## 2022-11-09 ENCOUNTER — PATIENT MESSAGE (OUTPATIENT)
Dept: HEALTH INFORMATION MANAGEMENT | Facility: OTHER | Age: 82
End: 2022-11-09

## 2022-12-08 DIAGNOSIS — G89.29 BILATERAL CHRONIC KNEE PAIN: ICD-10-CM

## 2022-12-08 DIAGNOSIS — M25.561 BILATERAL CHRONIC KNEE PAIN: ICD-10-CM

## 2022-12-08 DIAGNOSIS — M25.562 BILATERAL CHRONIC KNEE PAIN: ICD-10-CM

## 2022-12-08 NOTE — TELEPHONE ENCOUNTER
Dr. Singletary,    Please advise if you would like H&P on patient, or will you be clearing patient for surgery.  Thank you.   Received request via: Patient    Was the patient seen in the last year in this department? Yes    Does the patient have an active prescription (recently filled or refills available) for medication(s) requested? No    Does the patient have [facility] and need 100 day supply (blood pressure, diabetes and cholesterol meds only)? Medication is not for cholesterol, blood pressure or diabetes      Requested Prescriptions     Pending Prescriptions Disp Refills    HYDROcodone-acetaminophen (NORCO) 7.5-325 MG tab 60 Tablet 0     Sig: Take 1 Tablet by mouth every 12 hours as needed for Moderate Pain or Severe Pain for up to 30 days. From August 9, 2022 till September 8, 2022

## 2022-12-08 NOTE — TELEPHONE ENCOUNTER
Patient requesting refill on [place] 7.5/325.  Last visit:   9/27/2022.   30 days refill sent to preferred pharmacy.   Next appt 12/14/2022

## 2022-12-09 RX ORDER — HYDROCODONE BITARTRATE AND ACETAMINOPHEN 7.5; 325 MG/1; MG/1
1 TABLET ORAL EVERY 12 HOURS PRN
Qty: 60 TABLET | Refills: 0 | Status: SHIPPED
Start: 2022-12-09 | End: 2022-12-14

## 2022-12-14 ENCOUNTER — OFFICE VISIT (OUTPATIENT)
Dept: MEDICAL GROUP | Facility: MEDICAL CENTER | Age: 82
End: 2022-12-14
Payer: MEDICARE

## 2022-12-14 VITALS
TEMPERATURE: 98.5 F | OXYGEN SATURATION: 99 % | SYSTOLIC BLOOD PRESSURE: 126 MMHG | HEART RATE: 68 BPM | DIASTOLIC BLOOD PRESSURE: 64 MMHG

## 2022-12-14 DIAGNOSIS — J96.11 CHRONIC RESPIRATORY FAILURE WITH HYPOXIA (HCC): ICD-10-CM

## 2022-12-14 DIAGNOSIS — T50.2X5S DIURETICS CAUSING ADVERSE EFFECT IN THERAPEUTIC USE, SEQUELA: ICD-10-CM

## 2022-12-14 DIAGNOSIS — R60.0 LOWER EXTREMITY EDEMA: ICD-10-CM

## 2022-12-14 DIAGNOSIS — M25.561 BILATERAL CHRONIC KNEE PAIN: Chronic | ICD-10-CM

## 2022-12-14 DIAGNOSIS — Z79.891 CHRONIC USE OF OPIATE FOR THERAPEUTIC PURPOSE: ICD-10-CM

## 2022-12-14 DIAGNOSIS — I10 ESSENTIAL HYPERTENSION: Chronic | ICD-10-CM

## 2022-12-14 DIAGNOSIS — Z99.81 DEPENDENCE ON CONTINUOUS SUPPLEMENTAL OXYGEN: ICD-10-CM

## 2022-12-14 DIAGNOSIS — M25.562 BILATERAL CHRONIC KNEE PAIN: Chronic | ICD-10-CM

## 2022-12-14 DIAGNOSIS — F32.A MILD DEPRESSION: ICD-10-CM

## 2022-12-14 DIAGNOSIS — Z86.16 HISTORY OF COVID-19: ICD-10-CM

## 2022-12-14 DIAGNOSIS — G89.29 BILATERAL CHRONIC KNEE PAIN: Chronic | ICD-10-CM

## 2022-12-14 PROBLEM — J96.10 CHRONIC RESPIRATORY FAILURE (HCC): Status: ACTIVE | Noted: 2022-09-04

## 2022-12-14 PROBLEM — J96.10 CHRONIC RESPIRATORY FAILURE (HCC): Chronic | Status: ACTIVE | Noted: 2022-09-04

## 2022-12-14 PROCEDURE — 99214 OFFICE O/P EST MOD 30 MIN: CPT | Performed by: INTERNAL MEDICINE

## 2022-12-14 RX ORDER — DOXYCYCLINE HYCLATE 100 MG/1
CAPSULE ORAL
COMMUNITY
End: 2022-12-14

## 2022-12-14 RX ORDER — HYDROCODONE BITARTRATE AND ACETAMINOPHEN 7.5; 325 MG/1; MG/1
1 TABLET ORAL EVERY 12 HOURS PRN
Qty: 60 TABLET | Refills: 0 | Status: SHIPPED | OUTPATIENT
Start: 2023-01-09 | End: 2023-02-08

## 2022-12-14 RX ORDER — FUROSEMIDE 20 MG/1
20 TABLET ORAL
Qty: 90 TABLET | Refills: 1 | Status: SHIPPED | OUTPATIENT
Start: 2022-12-14 | End: 2023-01-01

## 2022-12-14 RX ORDER — LOSARTAN POTASSIUM 50 MG/1
TABLET ORAL
COMMUNITY
End: 2022-12-15

## 2022-12-14 RX ORDER — HYDROCODONE BITARTRATE AND ACETAMINOPHEN 7.5; 325 MG/1; MG/1
1 TABLET ORAL EVERY 12 HOURS PRN
Qty: 60 TABLET | Refills: 0 | Status: SHIPPED | OUTPATIENT
Start: 2023-02-09 | End: 2023-01-01

## 2022-12-14 RX ORDER — POTASSIUM CHLORIDE 750 MG/1
TABLET, EXTENDED RELEASE ORAL
COMMUNITY
End: 2022-12-14 | Stop reason: SDUPTHER

## 2022-12-14 RX ORDER — LOSARTAN POTASSIUM 100 MG/1
TABLET ORAL
COMMUNITY
End: 2022-12-14

## 2022-12-14 RX ORDER — CITALOPRAM 40 MG/1
40 TABLET ORAL DAILY
Qty: 90 TABLET | Refills: 3 | Status: SHIPPED | OUTPATIENT
Start: 2022-12-14 | End: 2023-01-01 | Stop reason: SDUPTHER

## 2022-12-14 RX ORDER — POTASSIUM CHLORIDE 750 MG/1
TABLET, EXTENDED RELEASE ORAL
Qty: 90 TABLET | Refills: 1 | Status: SHIPPED | OUTPATIENT
Start: 2022-12-14 | End: 2023-01-01

## 2022-12-14 RX ORDER — AMLODIPINE BESYLATE 5 MG/1
TABLET ORAL
COMMUNITY
End: 2023-01-01

## 2022-12-14 RX ORDER — HYDROCODONE BITARTRATE AND ACETAMINOPHEN 7.5; 325 MG/1; MG/1
1 TABLET ORAL EVERY 12 HOURS PRN
Qty: 60 TABLET | Refills: 0 | Status: SHIPPED | OUTPATIENT
Start: 2023-01-01 | End: 2023-01-01

## 2022-12-14 RX ORDER — LOSARTAN POTASSIUM 100 MG/1
100 TABLET ORAL
COMMUNITY
Start: 2022-10-03 | End: 2022-12-14

## 2022-12-14 RX ORDER — DEXAMETHASONE 6 MG/1
TABLET ORAL
COMMUNITY
End: 2022-12-14

## 2022-12-14 RX ORDER — FUROSEMIDE 40 MG/1
TABLET ORAL
COMMUNITY
End: 2022-12-14

## 2022-12-14 ASSESSMENT — ENCOUNTER SYMPTOMS
NEUROLOGICAL NEGATIVE: 1
PSYCHIATRIC NEGATIVE: 1
SHORTNESS OF BREATH: 1
CHILLS: 0
GASTROINTESTINAL NEGATIVE: 1
SORE THROAT: 0
FEVER: 0

## 2022-12-14 NOTE — ASSESSMENT & PLAN NOTE
BP is well controlled on current regimen, continue amlodipine and  losartan, patient will confirm dose and send me a message on [employer].

## 2022-12-14 NOTE — ASSESSMENT & PLAN NOTE
This is an ongoing problem due to recent COVID-19 infection.  Patient is requiring supplemental oxygen.  Currently 99% on 2 L/min.  Interested in weaning herself off.  Discussed the goal of saturation 92-96%

## 2022-12-14 NOTE — ASSESSMENT & PLAN NOTE
"Patient reports feeling \"more tense\"  lately, wondering if we can titrate her dose up.  Will increase citalopram to 40 mg daily.      "

## 2022-12-14 NOTE — PROGRESS NOTES
Subjective:     Diagnoses of Chronic use of opiate for therapeutic purpose, Lower extremity edema, Diuretics causing adverse effect in therapeutic use, sequela, History of COVID-19, Dependence on continuous supplemental oxygen, Chronic respiratory failure with hypoxia (HCC), Essential hypertension, Bilateral chronic knee pain, and Mild depression were pertinent to this visit.    HPI: Olive is a pleasant 82 y.o. female who presents today with her  chronic conditions follow-up  Problem   Chronic Respiratory Failure (Hcc)    Tested positive for COVID-19 9/4/2022.  Was discharged home with remote monitoring.  Saturating 99% on 2 L/min at the office today.   She is interested in weaning herself off oxygen.  We have discussed, that her goal saturation should be between 90-96%.   She is interested in referral to COVID recovery clinic.     Bilateral Chronic Knee Pain    Chronic knee pain on Norco 7.5-325 Q12H prn  Now she cut down to 60 tablets per month.   Pain is constant: weather makes it worse, patient is not able to ambulate due to multiple joint pain.  She is using a motorized scooter.  She is doing yoga/stretching 2-3 times per week.  PDMP reviewed today, no aberrant behavior, last fill of Norco was on December 09, 2022, 60 tablets, 30-day supply.  Renewed controlled substance and opiate agreement today, due to being on motorized scooter, patient is unable to provide her urine sample today.  She was given a paper order to complete at the lab.       Mild depression    PHQ-9 Screening 3/29/2022 8/23/2021   Little interest or pleasure in doing things 0 - not at all 0 - not at all   Feeling down, depressed, or hopeless 0 - not at all 0 - not at all   PHQ-2 Total Score 0 0       Interpretation of PHQ-9 Total Score   Score Severity   1-4 No Depression   5-9 Mild Depression   10-14 Moderate Depression   15-19 Moderately Severe Depression   20-27 Severe Depression  Chronic, stable on citalopram 20 mg tablet daily.  No  SI/HI.  Reports feeling more tense lately, wondering if we can increase her dose of Celexa.       Essential Hypertension    Currently on amlodipine 5 mg and losartan 50/ 100 mg daily?.  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 90 Tablet 3    amLODIPine (NORVASC) 5 MG Tab amlodipine 5 mg tablet   TAKE 1 TABLET BY MOUTH EVERY DAY      losartan (COZAAR) 50 MG Tab losartan 50 mg tablet   TAKE 1.5 TABLETS BY MOUTH EVERY DAY FOR 90 DAYS.      furosemide (LASIX) 20 MG Tab Take 1 Tablet by mouth 1 time a day as needed (le edema). 90 Tablet 1    potassium chloride SA (K-DUR) 10 MEQ Tab CR potassium chloride ER 10 mEq tablet,extended release(part/cryst) Strength: 10 mEq 90 Tablet 1    HYDROcodone-acetaminophen (NORCO) 7.5-325 MG tab Take 1 Tablet by mouth every 12 hours as needed for Moderate Pain or Severe Pain for up to 30 days. 60 Tablet 0    pravastatin (PRAVACHOL) 20 MG Tab Take 1 Tablet by mouth at bedtime. 90 Tablet 3    ferrous sulfate 325 (65 Fe) MG tablet Take 1 Tablet by mouth 2 times a day. 180 Tablet 2    azelastine (ASTELIN) 137 MCG/SPRAY nasal spray Administer 1 Spray into affected nostril(S) 2 times a day. 30 mL 1    Multiple Vitamins-Minerals (PRESERVISION AREDS 2) Cap       ursodiol (ACTIGALL) 300 MG Cap Take 300 mg by mouth 2 times a day.      ascorbic acid (VITAMIN C) 100 MG tablet       vitamin D3, cholecalciferol, 400 UNIT Tab tablet Take 10 mcg by mouth every  day.      B Complex-Folic Acid (B COMPLEX-VITAMIN B12 PO) Take 1 Tablet by mouth.       No current Epic-ordered facility-administered medications on file.     Review of Systems   Constitutional:  Negative for chills and fever.   HENT:  Negative for sore throat.    Respiratory:  Positive for shortness of breath.    Cardiovascular:  Negative for chest pain.   Gastrointestinal: Negative.    Musculoskeletal:  Positive for joint pain.   Neurological: Negative.    Psychiatric/Behavioral: Negative.       Objective:     Exam:  /64 (BP Location: Right arm, Patient Position: Sitting, BP Cuff Size: Adult)   Pulse 68   Temp 36.9 °C (98.5 °F) (Temporal)   SpO2 99% Comment: 2ltrs There is no height or weight on file to calculate BMI.    Physical Exam  Constitutional:       General: She is not in acute distress.     Appearance: She is obese. She is not ill-appearing or toxic-appearing.   Pulmonary:      Effort: Pulmonary effort is normal.      Comments: Nasal cannula connected to portable concentrator.    Neurological:      Mental Status: She is alert and oriented to person, place, and time.   Psychiatric:         Mood and Affect: Mood normal.     Assessment & Plan:   Olive  is a pleasant 82 y.o. female with the following -     Problem List Items Addressed This Visit       Bilateral chronic knee pain (Chronic)     This is a chronic problem, constant moderate to severe pain in bilateral knee joints.  Patient is taking Norco 7.5-325 mg every 12 hours as needed.  Controlled substance agreement on file, every 3 months appointment for medication refills.  Urine drug screen due will be collected at the lab due to impaired mobility.           Chronic respiratory failure (HCC) (Chronic)     This is an ongoing problem due to recent COVID-19 infection.  Patient is requiring supplemental oxygen.  Currently 99% on 2 L/min.  Interested in weaning herself off.  Discussed the goal of saturation 92-96%         Essential hypertension  "(Chronic)     BP is well controlled on current regimen, continue amlodipine and  losartan, patient will confirm dose and send me a message on [employer].         Relevant Medications    amLODIPine (NORVASC) 5 MG Tab    losartan (COZAAR) 50 MG Tab    furosemide (LASIX) 20 MG Tab    Mild depression (Chronic)     Patient reports feeling \"more tense\"  lately, wondering if we can titrate her dose up.  Will increase citalopram to 40 mg daily.             Chronic use of opiate for therapeutic purpose    Relevant Orders    Consent for Opiate Prescription    Pain Management Screen    Controlled Substance Treatment Agreement     Other Visit Diagnoses       Lower extremity edema        Relevant Medications    furosemide (LASIX) 20 MG Tab    Diuretics causing adverse effect in therapeutic use, sequela        Relevant Medications    potassium chloride SA (K-DUR) 10 MEQ Tab CR    History of COVID-19        Relevant Orders    Referral to Pulmonary and Sleep Medicine    Dependence on continuous supplemental oxygen        Relevant Orders    Referral to Pulmonary and Sleep Medicine          Return in about 3 months (around 3/14/2023), or if symptoms worsen or fail to improve.    Please note that this dictation was created using voice recognition software. I have made every reasonable attempt to correct obvious errors, but I expect that there are errors of grammar and possibly content that I did not discover before finalizing the note.        "

## 2022-12-14 NOTE — ASSESSMENT & PLAN NOTE
This is a chronic problem, constant moderate to severe pain in bilateral knee joints.  Patient is taking Norco 7.5-325 mg every 12 hours as needed.  Controlled substance agreement on file, every 3 months appointment for medication refills.  Urine drug screen due will be collected at the lab due to impaired mobility.

## 2022-12-15 DIAGNOSIS — I10 ESSENTIAL HYPERTENSION: ICD-10-CM

## 2022-12-15 RX ORDER — LOSARTAN POTASSIUM 100 MG/1
100 TABLET ORAL DAILY
Qty: 90 TABLET | Refills: 3 | Status: SHIPPED | OUTPATIENT
Start: 2022-12-15 | End: 2023-01-01 | Stop reason: SDUPTHER

## 2022-12-22 ENCOUNTER — TELEPHONE (OUTPATIENT)
Dept: MEDICAL GROUP | Facility: MEDICAL CENTER | Age: 82
End: 2022-12-22
Payer: MEDICARE

## 2022-12-22 NOTE — TELEPHONE ENCOUNTER
Contacted pharmacy to confirm sig for potassium chloride SA (K-DUR) 10 MEQ Tab CR pt to take 1 tab once daily

## 2023-01-01 ENCOUNTER — PATIENT OUTREACH (OUTPATIENT)
Dept: MEDICAL GROUP | Facility: MEDICAL CENTER | Age: 83
End: 2023-01-01
Payer: MEDICARE

## 2023-01-01 ENCOUNTER — HOSPITAL ENCOUNTER (OUTPATIENT)
Dept: LAB | Facility: MEDICAL CENTER | Age: 83
End: 2023-04-19
Attending: INTERNAL MEDICINE
Payer: MEDICARE

## 2023-01-01 ENCOUNTER — APPOINTMENT (OUTPATIENT)
Dept: RADIOLOGY | Facility: MEDICAL CENTER | Age: 83
End: 2023-01-01
Attending: INTERNAL MEDICINE
Payer: MEDICARE

## 2023-01-01 ENCOUNTER — PRE-ADMISSION TESTING (OUTPATIENT)
Dept: ADMISSIONS | Facility: MEDICAL CENTER | Age: 83
End: 2023-01-01
Attending: SPECIALIST
Payer: MEDICARE

## 2023-01-01 ENCOUNTER — APPOINTMENT (OUTPATIENT)
Dept: MEDICAL GROUP | Facility: MEDICAL CENTER | Age: 83
End: 2023-01-01
Payer: MEDICARE

## 2023-01-01 ENCOUNTER — PATIENT MESSAGE (OUTPATIENT)
Dept: MEDICAL GROUP | Facility: MEDICAL CENTER | Age: 83
End: 2023-01-01
Payer: MEDICARE

## 2023-01-01 ENCOUNTER — APPOINTMENT (OUTPATIENT)
Dept: RADIOLOGY | Facility: MEDICAL CENTER | Age: 83
End: 2023-01-01
Attending: SPECIALIST
Payer: MEDICARE

## 2023-01-01 ENCOUNTER — APPOINTMENT (OUTPATIENT)
Dept: CARDIOLOGY | Facility: MEDICAL CENTER | Age: 83
DRG: 375 | End: 2023-01-01
Attending: FAMILY MEDICINE
Payer: MEDICARE

## 2023-01-01 ENCOUNTER — TELEMEDICINE (OUTPATIENT)
Dept: MEDICAL GROUP | Facility: MEDICAL CENTER | Age: 83
End: 2023-01-01
Payer: MEDICARE

## 2023-01-01 ENCOUNTER — OFFICE VISIT (OUTPATIENT)
Dept: MEDICAL GROUP | Facility: MEDICAL CENTER | Age: 83
End: 2023-01-01
Payer: MEDICARE

## 2023-01-01 ENCOUNTER — PHARMACY VISIT (OUTPATIENT)
Dept: PHARMACY | Facility: MEDICAL CENTER | Age: 83
End: 2023-01-01
Payer: COMMERCIAL

## 2023-01-01 ENCOUNTER — HOSPITAL ENCOUNTER (OUTPATIENT)
Dept: RADIOLOGY | Facility: MEDICAL CENTER | Age: 83
End: 2023-05-12
Payer: MEDICARE

## 2023-01-01 ENCOUNTER — APPOINTMENT (OUTPATIENT)
Dept: RADIOLOGY | Facility: MEDICAL CENTER | Age: 83
DRG: 375 | End: 2023-01-01
Attending: FAMILY MEDICINE
Payer: MEDICARE

## 2023-01-01 ENCOUNTER — HOSPITAL ENCOUNTER (OUTPATIENT)
Dept: RADIOLOGY | Facility: MEDICAL CENTER | Age: 83
End: 2023-11-21
Attending: INTERNAL MEDICINE
Payer: MEDICARE

## 2023-01-01 ENCOUNTER — APPOINTMENT (OUTPATIENT)
Dept: RADIOLOGY | Facility: MEDICAL CENTER | Age: 83
DRG: 375 | End: 2023-01-01
Attending: STUDENT IN AN ORGANIZED HEALTH CARE EDUCATION/TRAINING PROGRAM
Payer: MEDICARE

## 2023-01-01 ENCOUNTER — HOSPITAL ENCOUNTER (OUTPATIENT)
Dept: RADIOLOGY | Facility: MEDICAL CENTER | Age: 83
End: 2023-10-19
Attending: INTERNAL MEDICINE
Payer: MEDICARE

## 2023-01-01 ENCOUNTER — TELEPHONE (OUTPATIENT)
Dept: HEALTH INFORMATION MANAGEMENT | Facility: OTHER | Age: 83
End: 2023-01-01
Payer: MEDICARE

## 2023-01-01 ENCOUNTER — HOSPITAL ENCOUNTER (OUTPATIENT)
Dept: LAB | Facility: MEDICAL CENTER | Age: 83
End: 2023-10-10
Attending: INTERNAL MEDICINE
Payer: MEDICARE

## 2023-01-01 ENCOUNTER — HOSPITAL ENCOUNTER (INPATIENT)
Facility: MEDICAL CENTER | Age: 83
LOS: 4 days | DRG: 375 | End: 2023-12-11
Attending: STUDENT IN AN ORGANIZED HEALTH CARE EDUCATION/TRAINING PROGRAM | Admitting: STUDENT IN AN ORGANIZED HEALTH CARE EDUCATION/TRAINING PROGRAM
Payer: MEDICARE

## 2023-01-01 ENCOUNTER — HOSPITAL ENCOUNTER (OUTPATIENT)
Dept: RADIOLOGY | Facility: MEDICAL CENTER | Age: 83
End: 2023-03-31
Payer: MEDICARE

## 2023-01-01 ENCOUNTER — HOSPITAL ENCOUNTER (OUTPATIENT)
Facility: MEDICAL CENTER | Age: 83
End: 2023-01-01
Attending: SPECIALIST | Admitting: SPECIALIST
Payer: MEDICARE

## 2023-01-01 ENCOUNTER — TELEPHONE (OUTPATIENT)
Dept: MEDICAL GROUP | Facility: MEDICAL CENTER | Age: 83
End: 2023-01-01
Payer: MEDICARE

## 2023-01-01 ENCOUNTER — HOSPITAL ENCOUNTER (OUTPATIENT)
Dept: LAB | Facility: MEDICAL CENTER | Age: 83
End: 2023-11-20
Attending: INTERNAL MEDICINE
Payer: MEDICARE

## 2023-01-01 VITALS
HEIGHT: 67 IN | HEART RATE: 79 BPM | DIASTOLIC BLOOD PRESSURE: 66 MMHG | TEMPERATURE: 98.6 F | WEIGHT: 250 LBS | BODY MASS INDEX: 39.24 KG/M2 | OXYGEN SATURATION: 97 % | RESPIRATION RATE: 18 BRPM | SYSTOLIC BLOOD PRESSURE: 121 MMHG

## 2023-01-01 VITALS
OXYGEN SATURATION: 96 % | TEMPERATURE: 98.8 F | HEIGHT: 67 IN | HEART RATE: 67 BPM | DIASTOLIC BLOOD PRESSURE: 70 MMHG | WEIGHT: 260 LBS | RESPIRATION RATE: 17 BRPM | BODY MASS INDEX: 40.81 KG/M2 | SYSTOLIC BLOOD PRESSURE: 122 MMHG

## 2023-01-01 VITALS
OXYGEN SATURATION: 95 % | HEART RATE: 73 BPM | TEMPERATURE: 97.5 F | SYSTOLIC BLOOD PRESSURE: 132 MMHG | DIASTOLIC BLOOD PRESSURE: 76 MMHG

## 2023-01-01 VITALS
DIASTOLIC BLOOD PRESSURE: 64 MMHG | SYSTOLIC BLOOD PRESSURE: 116 MMHG | TEMPERATURE: 97.3 F | OXYGEN SATURATION: 94 % | HEART RATE: 93 BPM | HEIGHT: 67 IN | WEIGHT: 260 LBS | BODY MASS INDEX: 40.81 KG/M2

## 2023-01-01 VITALS — DIASTOLIC BLOOD PRESSURE: 64 MMHG | HEART RATE: 94 BPM | OXYGEN SATURATION: 94 % | SYSTOLIC BLOOD PRESSURE: 110 MMHG

## 2023-01-01 DIAGNOSIS — I10 ESSENTIAL HYPERTENSION: Chronic | ICD-10-CM

## 2023-01-01 DIAGNOSIS — C54.1 ENDOMETRIAL SARCOMA (HCC): ICD-10-CM

## 2023-01-01 DIAGNOSIS — E78.2 MIXED HYPERLIPIDEMIA: ICD-10-CM

## 2023-01-01 DIAGNOSIS — Z79.891 CHRONIC USE OF OPIATE FOR THERAPEUTIC PURPOSE: ICD-10-CM

## 2023-01-01 DIAGNOSIS — G89.29 BILATERAL CHRONIC KNEE PAIN: Chronic | ICD-10-CM

## 2023-01-01 DIAGNOSIS — F32.A MILD DEPRESSION: ICD-10-CM

## 2023-01-01 DIAGNOSIS — M25.562 BILATERAL CHRONIC KNEE PAIN: Chronic | ICD-10-CM

## 2023-01-01 DIAGNOSIS — M25.561 BILATERAL CHRONIC KNEE PAIN: ICD-10-CM

## 2023-01-01 DIAGNOSIS — R60.0 BILATERAL LOWER EXTREMITY EDEMA: ICD-10-CM

## 2023-01-01 DIAGNOSIS — E04.1 THYROID NODULE: ICD-10-CM

## 2023-01-01 DIAGNOSIS — R10.9 ABDOMINAL DISCOMFORT: ICD-10-CM

## 2023-01-01 DIAGNOSIS — M25.561 BILATERAL CHRONIC KNEE PAIN: Chronic | ICD-10-CM

## 2023-01-01 DIAGNOSIS — R09.81 NASAL CONGESTION: ICD-10-CM

## 2023-01-01 DIAGNOSIS — R97.1 ELEVATED CANCER ANTIGEN 125 (CA 125): ICD-10-CM

## 2023-01-01 DIAGNOSIS — T50.2X5S DIURETICS CAUSING ADVERSE EFFECT IN THERAPEUTIC USE, SEQUELA: ICD-10-CM

## 2023-01-01 DIAGNOSIS — C54.1 MALIGNANT NEOPLASM OF ENDOMETRIUM (HCC): ICD-10-CM

## 2023-01-01 DIAGNOSIS — H61.23 IMPACTED CERUMEN OF BOTH EARS: ICD-10-CM

## 2023-01-01 DIAGNOSIS — Z85.9 HISTORY OF MALIGNANT CARCINOID TUMOR: ICD-10-CM

## 2023-01-01 DIAGNOSIS — R11.2 INTRACTABLE NAUSEA AND VOMITING: ICD-10-CM

## 2023-01-01 DIAGNOSIS — I10 ESSENTIAL HYPERTENSION: ICD-10-CM

## 2023-01-01 DIAGNOSIS — Z86.001 HISTORY OF CARCINOMA IN SITU OF CERVIX UTERI: ICD-10-CM

## 2023-01-01 DIAGNOSIS — R11.2 NAUSEA AND VOMITING, UNSPECIFIED VOMITING TYPE: ICD-10-CM

## 2023-01-01 DIAGNOSIS — M25.562 BILATERAL CHRONIC KNEE PAIN: ICD-10-CM

## 2023-01-01 DIAGNOSIS — R18.8 OTHER ASCITES: ICD-10-CM

## 2023-01-01 DIAGNOSIS — Z85.42 HISTORY OF UTERINE CANCER: ICD-10-CM

## 2023-01-01 DIAGNOSIS — R60.0 LOWER EXTREMITY EDEMA: ICD-10-CM

## 2023-01-01 DIAGNOSIS — N39.0 URINARY TRACT INFECTION WITHOUT HEMATURIA, SITE UNSPECIFIED: ICD-10-CM

## 2023-01-01 DIAGNOSIS — F32.A MILD DEPRESSION: Chronic | ICD-10-CM

## 2023-01-01 DIAGNOSIS — J96.11 CHRONIC RESPIRATORY FAILURE WITH HYPOXIA (HCC): Chronic | ICD-10-CM

## 2023-01-01 DIAGNOSIS — K74.3 PRIMARY BILIARY CHOLANGITIS (HCC): ICD-10-CM

## 2023-01-01 DIAGNOSIS — I27.20 PULMONARY HYPERTENSION (HCC): Chronic | ICD-10-CM

## 2023-01-01 DIAGNOSIS — R74.8 ELEVATED ALKALINE PHOSPHATASE LEVEL: ICD-10-CM

## 2023-01-01 DIAGNOSIS — E55.9 VITAMIN D DEFICIENCY: ICD-10-CM

## 2023-01-01 DIAGNOSIS — G89.29 BILATERAL CHRONIC KNEE PAIN: ICD-10-CM

## 2023-01-01 DIAGNOSIS — K83.8 BILE DUCT PROLIFERATION: ICD-10-CM

## 2023-01-01 DIAGNOSIS — E66.01 MORBID OBESITY (HCC): ICD-10-CM

## 2023-01-01 LAB
25(OH)D3 SERPL-MCNC: 44 NG/ML (ref 30–100)
AFP-TM SERPL-MCNC: 1 NG/ML (ref 0–9)
ALBUMIN FLD-MCNC: 2.4 G/DL
ALBUMIN SERPL BCP-MCNC: 2.7 G/DL (ref 3.2–4.9)
ALBUMIN SERPL BCP-MCNC: 3 G/DL (ref 3.2–4.9)
ALBUMIN SERPL BCP-MCNC: 3.2 G/DL (ref 3.2–4.9)
ALBUMIN SERPL BCP-MCNC: 3.3 G/DL (ref 3.2–4.9)
ALBUMIN SERPL BCP-MCNC: 3.5 G/DL (ref 3.2–4.9)
ALBUMIN SERPL BCP-MCNC: 3.5 G/DL (ref 3.2–4.9)
ALBUMIN SERPL BCP-MCNC: 3.7 G/DL (ref 3.2–4.9)
ALBUMIN SERPL BCP-MCNC: 3.8 G/DL (ref 3.2–4.9)
ALBUMIN/GLOB SERPL: 0.8 G/DL
ALBUMIN/GLOB SERPL: 0.9 G/DL
ALBUMIN/GLOB SERPL: 1 G/DL
ALBUMIN/GLOB SERPL: 1.1 G/DL
ALBUMIN/GLOB SERPL: 1.1 G/DL
ALBUMIN/GLOB SERPL: 1.3 G/DL
ALP SERPL-CCNC: 105 U/L (ref 30–99)
ALP SERPL-CCNC: 129 U/L (ref 30–99)
ALP SERPL-CCNC: 144 U/L (ref 30–99)
ALP SERPL-CCNC: 153 U/L (ref 30–99)
ALP SERPL-CCNC: 185 U/L (ref 30–99)
ALP SERPL-CCNC: 186 U/L (ref 30–99)
ALP SERPL-CCNC: 231 U/L (ref 30–99)
ALP SERPL-CCNC: 256 U/L (ref 30–99)
ALT SERPL-CCNC: 11 U/L (ref 2–50)
ALT SERPL-CCNC: 19 U/L (ref 2–50)
ALT SERPL-CCNC: 6 U/L (ref 2–50)
ALT SERPL-CCNC: 7 U/L (ref 2–50)
ALT SERPL-CCNC: 9 U/L (ref 2–50)
ALT SERPL-CCNC: 9 U/L (ref 2–50)
AMORPH CRY #/AREA URNS HPF: PRESENT /HPF
ANION GAP SERPL CALC-SCNC: 10 MMOL/L (ref 7–16)
ANION GAP SERPL CALC-SCNC: 11 MMOL/L (ref 7–16)
ANION GAP SERPL CALC-SCNC: 12 MMOL/L (ref 7–16)
ANION GAP SERPL CALC-SCNC: 12 MMOL/L (ref 7–16)
ANION GAP SERPL CALC-SCNC: 14 MMOL/L (ref 7–16)
ANION GAP SERPL CALC-SCNC: 17 MMOL/L (ref 7–16)
ANION GAP SERPL CALC-SCNC: 8 MMOL/L (ref 7–16)
ANION GAP SERPL CALC-SCNC: 9 MMOL/L (ref 7–16)
APPEARANCE FLD: NORMAL
APPEARANCE UR: ABNORMAL
AST SERPL-CCNC: 10 U/L (ref 12–45)
AST SERPL-CCNC: 13 U/L (ref 12–45)
AST SERPL-CCNC: 17 U/L (ref 12–45)
AST SERPL-CCNC: 8 U/L (ref 12–45)
AST SERPL-CCNC: 9 U/L (ref 12–45)
BACTERIA #/AREA URNS HPF: ABNORMAL /HPF
BACTERIA FLD AEROBE CULT: NORMAL
BASOPHILS # BLD AUTO: 0.4 % (ref 0–1.8)
BASOPHILS # BLD AUTO: 0.5 % (ref 0–1.8)
BASOPHILS # BLD: 0.03 K/UL (ref 0–0.12)
BASOPHILS # BLD: 0.04 K/UL (ref 0–0.12)
BASOPHILS NFR FLD: 1 %
BILIRUB SERPL-MCNC: 0.3 MG/DL (ref 0.1–1.5)
BILIRUB SERPL-MCNC: 0.4 MG/DL (ref 0.1–1.5)
BILIRUB SERPL-MCNC: 0.4 MG/DL (ref 0.1–1.5)
BILIRUB SERPL-MCNC: 0.5 MG/DL (ref 0.1–1.5)
BILIRUB SERPL-MCNC: 0.5 MG/DL (ref 0.1–1.5)
BILIRUB SERPL-MCNC: 0.6 MG/DL (ref 0.1–1.5)
BILIRUB SERPL-MCNC: 0.8 MG/DL (ref 0.1–1.5)
BILIRUB SERPL-MCNC: 0.8 MG/DL (ref 0.1–1.5)
BILIRUB UR QL STRIP.AUTO: NEGATIVE
BODY FLD TYPE: NORMAL
BODY FLD TYPE: NORMAL
BUN SERPL-MCNC: 11 MG/DL (ref 8–22)
BUN SERPL-MCNC: 14 MG/DL (ref 8–22)
BUN SERPL-MCNC: 16 MG/DL (ref 8–22)
BUN SERPL-MCNC: 16 MG/DL (ref 8–22)
BUN SERPL-MCNC: 17 MG/DL (ref 8–22)
BUN SERPL-MCNC: 17 MG/DL (ref 8–22)
BUN SERPL-MCNC: 18 MG/DL (ref 8–22)
BUN SERPL-MCNC: 28 MG/DL (ref 8–22)
CALCIUM ALBUM COR SERPL-MCNC: 10.1 MG/DL (ref 8.5–10.5)
CALCIUM ALBUM COR SERPL-MCNC: 10.2 MG/DL (ref 8.5–10.5)
CALCIUM ALBUM COR SERPL-MCNC: 10.4 MG/DL (ref 8.5–10.5)
CALCIUM ALBUM COR SERPL-MCNC: 10.5 MG/DL (ref 8.5–10.5)
CALCIUM ALBUM COR SERPL-MCNC: 10.8 MG/DL (ref 8.5–10.5)
CALCIUM ALBUM COR SERPL-MCNC: 9.6 MG/DL (ref 8.5–10.5)
CALCIUM SERPL-MCNC: 10.4 MG/DL (ref 8.5–10.5)
CALCIUM SERPL-MCNC: 9.3 MG/DL (ref 8.5–10.5)
CALCIUM SERPL-MCNC: 9.4 MG/DL (ref 8.5–10.5)
CALCIUM SERPL-MCNC: 9.5 MG/DL (ref 8.5–10.5)
CALCIUM SERPL-MCNC: 9.6 MG/DL (ref 8.5–10.5)
CALCIUM SERPL-MCNC: 9.7 MG/DL (ref 8.5–10.5)
CALCIUM SERPL-MCNC: 9.8 MG/DL (ref 8.5–10.5)
CALCIUM SERPL-MCNC: 9.9 MG/DL (ref 8.5–10.5)
CANCER AG125 SERPL-ACNC: 107 U/ML (ref 0–35)
CANCER AG19-9 SERPL-ACNC: 973 U/ML (ref 0–35)
CEA SERPL-MCNC: 8.2 NG/ML (ref 0–3)
CELLS FLD: 8
CHLORIDE SERPL-SCNC: 100 MMOL/L (ref 96–112)
CHLORIDE SERPL-SCNC: 100 MMOL/L (ref 96–112)
CHLORIDE SERPL-SCNC: 95 MMOL/L (ref 96–112)
CHLORIDE SERPL-SCNC: 97 MMOL/L (ref 96–112)
CHLORIDE SERPL-SCNC: 98 MMOL/L (ref 96–112)
CHLORIDE SERPL-SCNC: 99 MMOL/L (ref 96–112)
CHOLEST SERPL-MCNC: 127 MG/DL (ref 100–199)
CO2 SERPL-SCNC: 26 MMOL/L (ref 20–33)
CO2 SERPL-SCNC: 28 MMOL/L (ref 20–33)
CO2 SERPL-SCNC: 29 MMOL/L (ref 20–33)
CO2 SERPL-SCNC: 31 MMOL/L (ref 20–33)
CO2 SERPL-SCNC: 33 MMOL/L (ref 20–33)
COLOR FLD: NORMAL
COLOR UR: YELLOW
CREAT SERPL-MCNC: 0.53 MG/DL (ref 0.5–1.4)
CREAT SERPL-MCNC: 0.57 MG/DL (ref 0.5–1.4)
CREAT SERPL-MCNC: 0.58 MG/DL (ref 0.5–1.4)
CREAT SERPL-MCNC: 0.67 MG/DL (ref 0.5–1.4)
CREAT SERPL-MCNC: 0.7 MG/DL (ref 0.5–1.4)
CREAT SERPL-MCNC: 0.72 MG/DL (ref 0.5–1.4)
CREAT SERPL-MCNC: 0.76 MG/DL (ref 0.5–1.4)
CREAT SERPL-MCNC: 0.8 MG/DL (ref 0.5–1.4)
CYTOLOGY REG CYTOL: NORMAL
EKG IMPRESSION: NORMAL
EKG IMPRESSION: NORMAL
EOSINOPHIL # BLD AUTO: 0.02 K/UL (ref 0–0.51)
EOSINOPHIL # BLD AUTO: 0.1 K/UL (ref 0–0.51)
EOSINOPHIL NFR BLD: 0.2 % (ref 0–6.9)
EOSINOPHIL NFR BLD: 1.8 % (ref 0–6.9)
EOSINOPHIL NFR FLD: 1 %
EPI CELLS #/AREA URNS HPF: ABNORMAL /HPF
ERYTHROCYTE [DISTWIDTH] IN BLOOD BY AUTOMATED COUNT: 49.6 FL (ref 35.9–50)
ERYTHROCYTE [DISTWIDTH] IN BLOOD BY AUTOMATED COUNT: 49.7 FL (ref 35.9–50)
ERYTHROCYTE [DISTWIDTH] IN BLOOD BY AUTOMATED COUNT: 49.9 FL (ref 35.9–50)
ERYTHROCYTE [DISTWIDTH] IN BLOOD BY AUTOMATED COUNT: 50.3 FL (ref 35.9–50)
ERYTHROCYTE [DISTWIDTH] IN BLOOD BY AUTOMATED COUNT: 50.6 FL (ref 35.9–50)
ERYTHROCYTE [DISTWIDTH] IN BLOOD BY AUTOMATED COUNT: 51.2 FL (ref 35.9–50)
EST. AVERAGE GLUCOSE BLD GHB EST-MCNC: 105 MG/DL
GFR SERPLBLD CREATININE-BSD FMLA CKD-EPI: 73 ML/MIN/1.73 M 2
GFR SERPLBLD CREATININE-BSD FMLA CKD-EPI: 78 ML/MIN/1.73 M 2
GFR SERPLBLD CREATININE-BSD FMLA CKD-EPI: 83 ML/MIN/1.73 M 2
GFR SERPLBLD CREATININE-BSD FMLA CKD-EPI: 86 ML/MIN/1.73 M 2
GFR SERPLBLD CREATININE-BSD FMLA CKD-EPI: 87 ML/MIN/1.73 M 2
GFR SERPLBLD CREATININE-BSD FMLA CKD-EPI: 90 ML/MIN/1.73 M 2
GFR SERPLBLD CREATININE-BSD FMLA CKD-EPI: 90 ML/MIN/1.73 M 2
GFR SERPLBLD CREATININE-BSD FMLA CKD-EPI: 92 ML/MIN/1.73 M 2
GLOBULIN SER CALC-MCNC: 3 G/DL (ref 1.9–3.5)
GLOBULIN SER CALC-MCNC: 3.2 G/DL (ref 1.9–3.5)
GLOBULIN SER CALC-MCNC: 3.2 G/DL (ref 1.9–3.5)
GLOBULIN SER CALC-MCNC: 3.3 G/DL (ref 1.9–3.5)
GLOBULIN SER CALC-MCNC: 3.3 G/DL (ref 1.9–3.5)
GLOBULIN SER CALC-MCNC: 3.4 G/DL (ref 1.9–3.5)
GLOBULIN SER CALC-MCNC: 3.4 G/DL (ref 1.9–3.5)
GLOBULIN SER CALC-MCNC: 4.1 G/DL (ref 1.9–3.5)
GLUCOSE SERPL-MCNC: 101 MG/DL (ref 65–99)
GLUCOSE SERPL-MCNC: 106 MG/DL (ref 65–99)
GLUCOSE SERPL-MCNC: 106 MG/DL (ref 65–99)
GLUCOSE SERPL-MCNC: 107 MG/DL (ref 65–99)
GLUCOSE SERPL-MCNC: 112 MG/DL (ref 65–99)
GLUCOSE SERPL-MCNC: 114 MG/DL (ref 65–99)
GLUCOSE SERPL-MCNC: 138 MG/DL (ref 65–99)
GLUCOSE SERPL-MCNC: 163 MG/DL (ref 65–99)
GLUCOSE UR STRIP.AUTO-MCNC: NEGATIVE MG/DL
GRAM STN SPEC: NORMAL
GRAM STN SPEC: NORMAL
HBA1C MFR BLD: 5.3 % (ref 4–5.6)
HCT VFR BLD AUTO: 30.7 % (ref 37–47)
HCT VFR BLD AUTO: 31.2 % (ref 37–47)
HCT VFR BLD AUTO: 31.3 % (ref 37–47)
HCT VFR BLD AUTO: 32 % (ref 37–47)
HCT VFR BLD AUTO: 33.1 % (ref 37–47)
HCT VFR BLD AUTO: 41.2 % (ref 37–47)
HDLC SERPL-MCNC: 41 MG/DL
HGB BLD-MCNC: 10.1 G/DL (ref 12–16)
HGB BLD-MCNC: 10.2 G/DL (ref 12–16)
HGB BLD-MCNC: 10.6 G/DL (ref 12–16)
HGB BLD-MCNC: 12.7 G/DL (ref 12–16)
HGB BLD-MCNC: 9.5 G/DL (ref 12–16)
HGB BLD-MCNC: 9.7 G/DL (ref 12–16)
HYALINE CASTS #/AREA URNS LPF: ABNORMAL /LPF
IMM GRANULOCYTES # BLD AUTO: 0.02 K/UL (ref 0–0.11)
IMM GRANULOCYTES # BLD AUTO: 0.08 K/UL (ref 0–0.11)
IMM GRANULOCYTES NFR BLD AUTO: 0.4 % (ref 0–0.9)
IMM GRANULOCYTES NFR BLD AUTO: 0.8 % (ref 0–0.9)
INR PPP: 1.22 (ref 0.87–1.13)
KETONES UR STRIP.AUTO-MCNC: 15 MG/DL
LDLC SERPL CALC-MCNC: 65 MG/DL
LEUKOCYTE ESTERASE UR QL STRIP.AUTO: ABNORMAL
LIPASE SERPL-CCNC: 21 U/L (ref 11–82)
LV EJECT FRACT  99904: 57
LV EJECT FRACT MOD 2C 99903: 46.68
LV EJECT FRACT MOD 4C 99902: 64.93
LV EJECT FRACT MOD BP 99901: 57.63
LYMPHOCYTES # BLD AUTO: 0.42 K/UL (ref 1–4.8)
LYMPHOCYTES # BLD AUTO: 0.51 K/UL (ref 1–4.8)
LYMPHOCYTES NFR BLD: 4.1 % (ref 22–41)
LYMPHOCYTES NFR BLD: 9.3 % (ref 22–41)
LYMPHOCYTES NFR FLD: 40 %
MAGNESIUM SERPL-MCNC: 1.9 MG/DL (ref 1.5–2.5)
MAGNESIUM SERPL-MCNC: 2 MG/DL (ref 1.5–2.5)
MAGNESIUM SERPL-MCNC: 2.2 MG/DL (ref 1.5–2.5)
MAGNESIUM SERPL-MCNC: 2.5 MG/DL (ref 1.5–2.5)
MCH RBC QN AUTO: 27.6 PG (ref 27–33)
MCH RBC QN AUTO: 28.6 PG (ref 27–33)
MCH RBC QN AUTO: 28.7 PG (ref 27–33)
MCH RBC QN AUTO: 28.9 PG (ref 27–33)
MCH RBC QN AUTO: 28.9 PG (ref 27–33)
MCH RBC QN AUTO: 29 PG (ref 27–33)
MCHC RBC AUTO-ENTMCNC: 30.4 G/DL (ref 32.2–35.5)
MCHC RBC AUTO-ENTMCNC: 30.8 G/DL (ref 32.2–35.5)
MCHC RBC AUTO-ENTMCNC: 31.6 G/DL (ref 32.2–35.5)
MCHC RBC AUTO-ENTMCNC: 31.9 G/DL (ref 32.2–35.5)
MCHC RBC AUTO-ENTMCNC: 32 G/DL (ref 32.2–35.5)
MCHC RBC AUTO-ENTMCNC: 32.3 G/DL (ref 32.2–35.5)
MCV RBC AUTO: 89.2 FL (ref 81.4–97.8)
MCV RBC AUTO: 89.4 FL (ref 81.4–97.8)
MCV RBC AUTO: 90.1 FL (ref 81.4–97.8)
MCV RBC AUTO: 90.7 FL (ref 81.4–97.8)
MCV RBC AUTO: 91.9 FL (ref 81.4–97.8)
MCV RBC AUTO: 93.8 FL (ref 81.4–97.8)
MICRO URNS: ABNORMAL
MONOCYTES # BLD AUTO: 0.57 K/UL (ref 0–0.85)
MONOCYTES # BLD AUTO: 0.91 K/UL (ref 0–0.85)
MONOCYTES NFR BLD AUTO: 10.4 % (ref 0–13.4)
MONOCYTES NFR BLD AUTO: 8.8 % (ref 0–13.4)
MONOS+MACROS NFR FLD MANUAL: 28 %
MUCOUS THREADS #/AREA URNS HPF: ABNORMAL /HPF
NEUTROPHILS # BLD AUTO: 4.24 K/UL (ref 1.82–7.42)
NEUTROPHILS # BLD AUTO: 8.87 K/UL (ref 1.82–7.42)
NEUTROPHILS NFR BLD: 77.6 % (ref 44–72)
NEUTROPHILS NFR BLD: 85.7 % (ref 44–72)
NEUTROPHILS NFR FLD: 22 %
NITRITE UR QL STRIP.AUTO: NEGATIVE
NRBC # BLD AUTO: 0 K/UL
NRBC # BLD AUTO: 0 K/UL
NRBC BLD-RTO: 0 /100 WBC (ref 0–0.2)
NRBC BLD-RTO: 0 /100 WBC (ref 0–0.2)
NT-PROBNP SERPL IA-MCNC: 1441 PG/ML (ref 0–125)
NT-PROBNP SERPL IA-MCNC: 2931 PG/ML (ref 0–125)
NT-PROBNP SERPL IA-MCNC: 3494 PG/ML (ref 0–125)
NT-PROBNP SERPL IA-MCNC: 776 PG/ML (ref 0–125)
NT-PROBNP SERPL IA-MCNC: 902 PG/ML (ref 0–125)
NUC CELL # FLD: 139 CELLS/UL
PH UR STRIP.AUTO: 6 [PH] (ref 5–8)
PHOSPHATE SERPL-MCNC: 2.1 MG/DL (ref 2.5–4.5)
PHOSPHATE SERPL-MCNC: 2.3 MG/DL (ref 2.5–4.5)
PHOSPHATE SERPL-MCNC: 2.6 MG/DL (ref 2.5–4.5)
PHOSPHATE SERPL-MCNC: 2.8 MG/DL (ref 2.5–4.5)
PLATELET # BLD AUTO: 258 K/UL (ref 164–446)
PLATELET # BLD AUTO: 378 K/UL (ref 164–446)
PLATELET # BLD AUTO: 386 K/UL (ref 164–446)
PLATELET # BLD AUTO: 424 K/UL (ref 164–446)
PLATELET # BLD AUTO: 459 K/UL (ref 164–446)
PLATELET # BLD AUTO: 491 K/UL (ref 164–446)
PMV BLD AUTO: 10.3 FL (ref 9–12.9)
PMV BLD AUTO: 9.5 FL (ref 9–12.9)
PMV BLD AUTO: 9.5 FL (ref 9–12.9)
PMV BLD AUTO: 9.6 FL (ref 9–12.9)
PMV BLD AUTO: 9.9 FL (ref 9–12.9)
PMV BLD AUTO: 9.9 FL (ref 9–12.9)
POTASSIUM SERPL-SCNC: 3.5 MMOL/L (ref 3.6–5.5)
POTASSIUM SERPL-SCNC: 3.6 MMOL/L (ref 3.6–5.5)
POTASSIUM SERPL-SCNC: 3.7 MMOL/L (ref 3.6–5.5)
POTASSIUM SERPL-SCNC: 3.8 MMOL/L (ref 3.6–5.5)
POTASSIUM SERPL-SCNC: 4.3 MMOL/L (ref 3.6–5.5)
POTASSIUM SERPL-SCNC: 4.5 MMOL/L (ref 3.6–5.5)
POTASSIUM SERPL-SCNC: 4.7 MMOL/L (ref 3.6–5.5)
POTASSIUM SERPL-SCNC: 5 MMOL/L (ref 3.6–5.5)
PROT FLD-MCNC: 4.7 G/DL
PROT SERPL-MCNC: 5.9 G/DL (ref 6–8.2)
PROT SERPL-MCNC: 6.2 G/DL (ref 6–8.2)
PROT SERPL-MCNC: 6.6 G/DL (ref 6–8.2)
PROT SERPL-MCNC: 6.7 G/DL (ref 6–8.2)
PROT SERPL-MCNC: 6.8 G/DL (ref 6–8.2)
PROT SERPL-MCNC: 6.8 G/DL (ref 6–8.2)
PROT SERPL-MCNC: 7 G/DL (ref 6–8.2)
PROT SERPL-MCNC: 7.6 G/DL (ref 6–8.2)
PROT UR QL STRIP: 30 MG/DL
PROTHROMBIN TIME: 15.5 SEC (ref 12–14.6)
RBC # BLD AUTO: 3.34 M/UL (ref 4.2–5.4)
RBC # BLD AUTO: 3.44 M/UL (ref 4.2–5.4)
RBC # BLD AUTO: 3.5 M/UL (ref 4.2–5.4)
RBC # BLD AUTO: 3.55 M/UL (ref 4.2–5.4)
RBC # BLD AUTO: 3.71 M/UL (ref 4.2–5.4)
RBC # BLD AUTO: 4.39 M/UL (ref 4.2–5.4)
RBC # FLD: 7000 CELLS/UL
RBC # URNS HPF: ABNORMAL /HPF
RBC UR QL AUTO: ABNORMAL
SIGNIFICANT IND 70042: NORMAL
SIGNIFICANT IND 70042: NORMAL
SITE SITE: NORMAL
SITE SITE: NORMAL
SODIUM SERPL-SCNC: 136 MMOL/L (ref 135–145)
SODIUM SERPL-SCNC: 136 MMOL/L (ref 135–145)
SODIUM SERPL-SCNC: 138 MMOL/L (ref 135–145)
SODIUM SERPL-SCNC: 138 MMOL/L (ref 135–145)
SODIUM SERPL-SCNC: 139 MMOL/L (ref 135–145)
SODIUM SERPL-SCNC: 140 MMOL/L (ref 135–145)
SODIUM SERPL-SCNC: 141 MMOL/L (ref 135–145)
SODIUM SERPL-SCNC: 141 MMOL/L (ref 135–145)
SOURCE SOURCE: NORMAL
SOURCE SOURCE: NORMAL
SP GR UR STRIP.AUTO: >=1.045
TRANS CELLS #/AREA URNS HPF: ABNORMAL /HPF
TRIGL SERPL-MCNC: 103 MG/DL (ref 0–149)
TROPONIN T SERPL-MCNC: 25 NG/L (ref 6–19)
TSH SERPL DL<=0.005 MIU/L-ACNC: 0.71 UIU/ML (ref 0.38–5.33)
UROBILINOGEN UR STRIP.AUTO-MCNC: 1 MG/DL
WBC # BLD AUTO: 10.3 K/UL (ref 4.8–10.8)
WBC # BLD AUTO: 5.5 K/UL (ref 4.8–10.8)
WBC # BLD AUTO: 7.8 K/UL (ref 4.8–10.8)
WBC # BLD AUTO: 8 K/UL (ref 4.8–10.8)
WBC # BLD AUTO: 9.3 K/UL (ref 4.8–10.8)
WBC # BLD AUTO: 9.5 K/UL (ref 4.8–10.8)
WBC #/AREA URNS HPF: ABNORMAL /HPF

## 2023-01-01 PROCEDURE — A9270 NON-COVERED ITEM OR SERVICE: HCPCS | Performed by: STUDENT IN AN ORGANIZED HEALTH CARE EDUCATION/TRAINING PROGRAM

## 2023-01-01 PROCEDURE — 84100 ASSAY OF PHOSPHORUS: CPT

## 2023-01-01 PROCEDURE — 99285 EMERGENCY DEPT VISIT HI MDM: CPT

## 2023-01-01 PROCEDURE — 80053 COMPREHEN METABOLIC PANEL: CPT

## 2023-01-01 PROCEDURE — C9113 INJ PANTOPRAZOLE SODIUM, VIA: HCPCS | Performed by: FAMILY MEDICINE

## 2023-01-01 PROCEDURE — 99497 ADVNCD CARE PLAN 30 MIN: CPT | Performed by: STUDENT IN AN ORGANIZED HEALTH CARE EDUCATION/TRAINING PROGRAM

## 2023-01-01 PROCEDURE — 307738 PARACENTESIS ABDOMINAL KIT: Performed by: STUDENT IN AN ORGANIZED HEALTH CARE EDUCATION/TRAINING PROGRAM

## 2023-01-01 PROCEDURE — 99214 OFFICE O/P EST MOD 30 MIN: CPT | Mod: 95 | Performed by: INTERNAL MEDICINE

## 2023-01-01 PROCEDURE — 700102 HCHG RX REV CODE 250 W/ 637 OVERRIDE(OP): Performed by: STUDENT IN AN ORGANIZED HEALTH CARE EDUCATION/TRAINING PROGRAM

## 2023-01-01 PROCEDURE — 700111 HCHG RX REV CODE 636 W/ 250 OVERRIDE (IP): Performed by: STUDENT IN AN ORGANIZED HEALTH CARE EDUCATION/TRAINING PROGRAM

## 2023-01-01 PROCEDURE — 83880 ASSAY OF NATRIURETIC PEPTIDE: CPT

## 2023-01-01 PROCEDURE — 85027 COMPLETE CBC AUTOMATED: CPT

## 2023-01-01 PROCEDURE — 97535 SELF CARE MNGMENT TRAINING: CPT

## 2023-01-01 PROCEDURE — 84443 ASSAY THYROID STIM HORMONE: CPT

## 2023-01-01 PROCEDURE — 87205 SMEAR GRAM STAIN: CPT

## 2023-01-01 PROCEDURE — 0W9G3ZZ DRAINAGE OF PERITONEAL CAVITY, PERCUTANEOUS APPROACH: ICD-10-PCS | Performed by: STUDENT IN AN ORGANIZED HEALTH CARE EDUCATION/TRAINING PROGRAM

## 2023-01-01 PROCEDURE — C1729 CATH, DRAINAGE: HCPCS | Performed by: STUDENT IN AN ORGANIZED HEALTH CARE EDUCATION/TRAINING PROGRAM

## 2023-01-01 PROCEDURE — 84484 ASSAY OF TROPONIN QUANT: CPT

## 2023-01-01 PROCEDURE — 700111 HCHG RX REV CODE 636 W/ 250 OVERRIDE (IP): Performed by: FAMILY MEDICINE

## 2023-01-01 PROCEDURE — 3078F DIAST BP <80 MM HG: CPT | Performed by: INTERNAL MEDICINE

## 2023-01-01 PROCEDURE — 3075F SYST BP GE 130 - 139MM HG: CPT | Performed by: INTERNAL MEDICINE

## 2023-01-01 PROCEDURE — A9270 NON-COVERED ITEM OR SERVICE: HCPCS | Performed by: FAMILY MEDICINE

## 2023-01-01 PROCEDURE — 83690 ASSAY OF LIPASE: CPT

## 2023-01-01 PROCEDURE — 700102 HCHG RX REV CODE 250 W/ 637 OVERRIDE(OP): Performed by: FAMILY MEDICINE

## 2023-01-01 PROCEDURE — 700101 HCHG RX REV CODE 250: Performed by: STUDENT IN AN ORGANIZED HEALTH CARE EDUCATION/TRAINING PROGRAM

## 2023-01-01 PROCEDURE — 700105 HCHG RX REV CODE 258: Performed by: STUDENT IN AN ORGANIZED HEALTH CARE EDUCATION/TRAINING PROGRAM

## 2023-01-01 PROCEDURE — 700117 HCHG RX CONTRAST REV CODE 255: Performed by: STUDENT IN AN ORGANIZED HEALTH CARE EDUCATION/TRAINING PROGRAM

## 2023-01-01 PROCEDURE — 76700 US EXAM ABDOM COMPLETE: CPT

## 2023-01-01 PROCEDURE — 700111 HCHG RX REV CODE 636 W/ 250 OVERRIDE (IP): Mod: JZ | Performed by: STUDENT IN AN ORGANIZED HEALTH CARE EDUCATION/TRAINING PROGRAM

## 2023-01-01 PROCEDURE — 93010 ELECTROCARDIOGRAM REPORT: CPT | Performed by: INTERNAL MEDICINE

## 2023-01-01 PROCEDURE — 93005 ELECTROCARDIOGRAM TRACING: CPT | Performed by: FAMILY MEDICINE

## 2023-01-01 PROCEDURE — 86304 IMMUNOASSAY TUMOR CA 125: CPT

## 2023-01-01 PROCEDURE — RXMED WILLOW AMBULATORY MEDICATION CHARGE: Performed by: FAMILY MEDICINE

## 2023-01-01 PROCEDURE — 96375 TX/PRO/DX INJ NEW DRUG ADDON: CPT

## 2023-01-01 PROCEDURE — 85025 COMPLETE CBC W/AUTO DIFF WBC: CPT

## 2023-01-01 PROCEDURE — 88112 CYTOPATH CELL ENHANCE TECH: CPT

## 2023-01-01 PROCEDURE — 700105 HCHG RX REV CODE 258: Performed by: FAMILY MEDICINE

## 2023-01-01 PROCEDURE — 82042 OTHER SOURCE ALBUMIN QUAN EA: CPT

## 2023-01-01 PROCEDURE — 83735 ASSAY OF MAGNESIUM: CPT

## 2023-01-01 PROCEDURE — 99223 1ST HOSP IP/OBS HIGH 75: CPT | Mod: AI,25 | Performed by: STUDENT IN AN ORGANIZED HEALTH CARE EDUCATION/TRAINING PROGRAM

## 2023-01-01 PROCEDURE — 87070 CULTURE OTHR SPECIMN AEROBIC: CPT

## 2023-01-01 PROCEDURE — 99214 OFFICE O/P EST MOD 30 MIN: CPT | Performed by: INTERNAL MEDICINE

## 2023-01-01 PROCEDURE — 3074F SYST BP LT 130 MM HG: CPT | Performed by: INTERNAL MEDICINE

## 2023-01-01 PROCEDURE — 99233 SBSQ HOSP IP/OBS HIGH 50: CPT | Performed by: FAMILY MEDICINE

## 2023-01-01 PROCEDURE — 700111 HCHG RX REV CODE 636 W/ 250 OVERRIDE (IP)

## 2023-01-01 PROCEDURE — 93005 ELECTROCARDIOGRAM TRACING: CPT | Performed by: STUDENT IN AN ORGANIZED HEALTH CARE EDUCATION/TRAINING PROGRAM

## 2023-01-01 PROCEDURE — 88305 TISSUE EXAM BY PATHOLOGIST: CPT

## 2023-01-01 PROCEDURE — 82105 ALPHA-FETOPROTEIN SERUM: CPT

## 2023-01-01 PROCEDURE — 770006 HCHG ROOM/CARE - MED/SURG/GYN SEMI*

## 2023-01-01 PROCEDURE — 82306 VITAMIN D 25 HYDROXY: CPT

## 2023-01-01 PROCEDURE — 36415 COLL VENOUS BLD VENIPUNCTURE: CPT

## 2023-01-01 PROCEDURE — 93306 TTE W/DOPPLER COMPLETE: CPT | Mod: 26 | Performed by: INTERNAL MEDICINE

## 2023-01-01 PROCEDURE — 76536 US EXAM OF HEAD AND NECK: CPT

## 2023-01-01 PROCEDURE — 82378 CARCINOEMBRYONIC ANTIGEN: CPT

## 2023-01-01 PROCEDURE — 74177 CT ABD & PELVIS W/CONTRAST: CPT

## 2023-01-01 PROCEDURE — 85610 PROTHROMBIN TIME: CPT

## 2023-01-01 PROCEDURE — 700111 HCHG RX REV CODE 636 W/ 250 OVERRIDE (IP): Mod: JZ | Performed by: NURSE PRACTITIONER

## 2023-01-01 PROCEDURE — 84157 ASSAY OF PROTEIN OTHER: CPT

## 2023-01-01 PROCEDURE — 96374 THER/PROPH/DIAG INJ IV PUSH: CPT

## 2023-01-01 PROCEDURE — 99232 SBSQ HOSP IP/OBS MODERATE 35: CPT | Performed by: FAMILY MEDICINE

## 2023-01-01 PROCEDURE — 87015 SPECIMEN INFECT AGNT CONCNTJ: CPT

## 2023-01-01 PROCEDURE — 93306 TTE W/DOPPLER COMPLETE: CPT

## 2023-01-01 PROCEDURE — 700101 HCHG RX REV CODE 250: Performed by: FAMILY MEDICINE

## 2023-01-01 PROCEDURE — 700111 HCHG RX REV CODE 636 W/ 250 OVERRIDE (IP): Mod: JZ | Performed by: FAMILY MEDICINE

## 2023-01-01 PROCEDURE — 86301 IMMUNOASSAY TUMOR CA 19-9: CPT

## 2023-01-01 PROCEDURE — 81001 URINALYSIS AUTO W/SCOPE: CPT

## 2023-01-01 PROCEDURE — 93970 EXTREMITY STUDY: CPT

## 2023-01-01 PROCEDURE — 99239 HOSP IP/OBS DSCHRG MGMT >30: CPT | Performed by: FAMILY MEDICINE

## 2023-01-01 PROCEDURE — 83036 HEMOGLOBIN GLYCOSYLATED A1C: CPT

## 2023-01-01 PROCEDURE — 80061 LIPID PANEL: CPT

## 2023-01-01 PROCEDURE — 97163 PT EVAL HIGH COMPLEX 45 MIN: CPT

## 2023-01-01 PROCEDURE — 89051 BODY FLUID CELL COUNT: CPT

## 2023-01-01 PROCEDURE — A9552 F18 FDG: HCPCS

## 2023-01-01 RX ORDER — HYDROCODONE BITARTRATE AND ACETAMINOPHEN 7.5; 325 MG/1; MG/1
1 TABLET ORAL EVERY 8 HOURS PRN
Qty: 90 TABLET | Refills: 0 | Status: SHIPPED
Start: 2023-01-01 | End: 2023-01-01

## 2023-01-01 RX ORDER — ENOXAPARIN SODIUM 100 MG/ML
40 INJECTION SUBCUTANEOUS DAILY
Status: DISCONTINUED | OUTPATIENT
Start: 2023-01-01 | End: 2023-01-01 | Stop reason: HOSPADM

## 2023-01-01 RX ORDER — MIDAZOLAM HYDROCHLORIDE 1 MG/ML
0.5 INJECTION INTRAMUSCULAR; INTRAVENOUS EVERY 6 HOURS PRN
Status: DISCONTINUED | OUTPATIENT
Start: 2023-01-01 | End: 2023-01-01

## 2023-01-01 RX ORDER — MIDAZOLAM HYDROCHLORIDE 1 MG/ML
1 INJECTION INTRAMUSCULAR; INTRAVENOUS EVERY 6 HOURS PRN
Status: DISCONTINUED | OUTPATIENT
Start: 2023-01-01 | End: 2023-01-01

## 2023-01-01 RX ORDER — DEXAMETHASONE 6 MG/1
TABLET ORAL
COMMUNITY
End: 2023-01-01

## 2023-01-01 RX ORDER — HYDROCODONE BITARTRATE AND ACETAMINOPHEN 7.5; 325 MG/1; MG/1
TABLET ORAL
COMMUNITY
Start: 2023-01-01 | End: 2023-01-01

## 2023-01-01 RX ORDER — PRAVASTATIN SODIUM 20 MG
20 TABLET ORAL
Status: DISCONTINUED | OUTPATIENT
Start: 2023-01-01 | End: 2023-01-01 | Stop reason: HOSPADM

## 2023-01-01 RX ORDER — POLYETHYLENE GLYCOL 400 2.5 MG/ML
1 SOLUTION/ DROPS OPHTHALMIC EVERY EVENING
COMMUNITY
End: 2024-01-01 | Stop reason: SDUPTHER

## 2023-01-01 RX ORDER — CHOLECALCIFEROL (VITAMIN D3) 125 MCG
5 CAPSULE ORAL ONCE
Status: DISCONTINUED | OUTPATIENT
Start: 2023-01-01 | End: 2023-01-01

## 2023-01-01 RX ORDER — HYDROCODONE BITARTRATE AND ACETAMINOPHEN 7.5; 325 MG/1; MG/1
1 TABLET ORAL EVERY 8 HOURS PRN
Qty: 90 TABLET | Refills: 0 | Status: SHIPPED | OUTPATIENT
Start: 2023-01-01 | End: 2023-01-01

## 2023-01-01 RX ORDER — LOSARTAN POTASSIUM 100 MG/1
100 TABLET ORAL DAILY
Qty: 90 TABLET | Refills: 3 | Status: SHIPPED | OUTPATIENT
Start: 2023-01-01 | End: 2024-01-01

## 2023-01-01 RX ORDER — ONDANSETRON 2 MG/ML
4 INJECTION INTRAMUSCULAR; INTRAVENOUS EVERY 4 HOURS PRN
Status: DISCONTINUED | OUTPATIENT
Start: 2023-01-01 | End: 2023-01-01

## 2023-01-01 RX ORDER — AZELASTINE HYDROCHLORIDE 137 UG/1
1 SPRAY, METERED NASAL DAILY
COMMUNITY
End: 2024-01-01 | Stop reason: SDUPTHER

## 2023-01-01 RX ORDER — CITALOPRAM 40 MG/1
40 TABLET ORAL NIGHTLY
Status: DISCONTINUED | OUTPATIENT
Start: 2023-01-01 | End: 2023-01-01 | Stop reason: HOSPADM

## 2023-01-01 RX ORDER — AMLODIPINE BESYLATE 5 MG/1
5 TABLET ORAL DAILY
COMMUNITY
End: 2024-01-01 | Stop reason: SDUPTHER

## 2023-01-01 RX ORDER — OMEPRAZOLE 20 MG/1
20 CAPSULE, DELAYED RELEASE ORAL 2 TIMES DAILY
Status: DISCONTINUED | OUTPATIENT
Start: 2023-01-01 | End: 2023-01-01 | Stop reason: HOSPADM

## 2023-01-01 RX ORDER — SODIUM CHLORIDE, SODIUM LACTATE, POTASSIUM CHLORIDE, CALCIUM CHLORIDE 600; 310; 30; 20 MG/100ML; MG/100ML; MG/100ML; MG/100ML
INJECTION, SOLUTION INTRAVENOUS
Status: CANCELLED | OUTPATIENT
Start: 2024-01-01 | End: 2024-01-01

## 2023-01-01 RX ORDER — HYDROCODONE BITARTRATE AND ACETAMINOPHEN 7.5; 325 MG/1; MG/1
1 TABLET ORAL EVERY 8 HOURS PRN
Qty: 90 TABLET | Refills: 0 | Status: SHIPPED | OUTPATIENT
Start: 2024-01-01 | End: 2024-01-01

## 2023-01-01 RX ORDER — FUROSEMIDE 10 MG/ML
20 INJECTION INTRAMUSCULAR; INTRAVENOUS
Status: DISCONTINUED | OUTPATIENT
Start: 2023-01-01 | End: 2023-01-01

## 2023-01-01 RX ORDER — FUROSEMIDE 40 MG/1
40 TABLET ORAL 2 TIMES DAILY PRN
Qty: 180 TABLET | Refills: 3 | Status: SHIPPED | OUTPATIENT
Start: 2023-01-01 | End: 2024-01-01 | Stop reason: SDUPTHER

## 2023-01-01 RX ORDER — CITALOPRAM 40 MG/1
40 TABLET ORAL DAILY
Status: DISCONTINUED | OUTPATIENT
Start: 2023-01-01 | End: 2023-01-01

## 2023-01-01 RX ORDER — AZELASTINE 1 MG/ML
1 SPRAY, METERED NASAL 2 TIMES DAILY
Qty: 90 ML | Refills: 1 | Status: SHIPPED | OUTPATIENT
Start: 2023-01-01 | End: 2023-01-01

## 2023-01-01 RX ORDER — POTASSIUM CHLORIDE 750 MG/1
10 TABLET, EXTENDED RELEASE ORAL EVERY EVENING
COMMUNITY
End: 2024-01-01

## 2023-01-01 RX ORDER — AMLODIPINE BESYLATE 5 MG/1
5 TABLET ORAL DAILY
Status: DISCONTINUED | OUTPATIENT
Start: 2023-01-01 | End: 2023-01-01

## 2023-01-01 RX ORDER — AMLODIPINE BESYLATE 5 MG/1
5 TABLET ORAL
Status: DISCONTINUED | OUTPATIENT
Start: 2023-01-01 | End: 2023-01-01 | Stop reason: HOSPADM

## 2023-01-01 RX ORDER — FUROSEMIDE 40 MG/1
40 TABLET ORAL 2 TIMES DAILY PRN
Status: DISCONTINUED | OUTPATIENT
Start: 2023-01-01 | End: 2023-01-01

## 2023-01-01 RX ORDER — SCOLOPAMINE TRANSDERMAL SYSTEM 1 MG/1
1 PATCH, EXTENDED RELEASE TRANSDERMAL
Status: DISCONTINUED | OUTPATIENT
Start: 2023-01-01 | End: 2023-01-01 | Stop reason: HOSPADM

## 2023-01-01 RX ORDER — PROMETHAZINE HYDROCHLORIDE 25 MG/1
25 TABLET ORAL EVERY 6 HOURS PRN
Status: DISCONTINUED | OUTPATIENT
Start: 2023-01-01 | End: 2023-01-01 | Stop reason: HOSPADM

## 2023-01-01 RX ORDER — LOSARTAN POTASSIUM 50 MG/1
100 TABLET ORAL DAILY
Status: DISCONTINUED | OUTPATIENT
Start: 2023-01-01 | End: 2023-01-01 | Stop reason: HOSPADM

## 2023-01-01 RX ORDER — FERROUS SULFATE 325(65) MG
325 TABLET ORAL 2 TIMES DAILY WITH MEALS
COMMUNITY
End: 2024-01-01

## 2023-01-01 RX ORDER — FUROSEMIDE 10 MG/ML
20 INJECTION INTRAMUSCULAR; INTRAVENOUS ONCE
Status: COMPLETED | OUTPATIENT
Start: 2023-01-01 | End: 2023-01-01

## 2023-01-01 RX ORDER — DIPHENHYDRAMINE HYDROCHLORIDE 50 MG/ML
25 INJECTION INTRAMUSCULAR; INTRAVENOUS ONCE
Status: COMPLETED | OUTPATIENT
Start: 2023-01-01 | End: 2023-01-01

## 2023-01-01 RX ORDER — OMEPRAZOLE 20 MG/1
20 CAPSULE, DELAYED RELEASE ORAL DAILY
COMMUNITY
End: 2023-01-01

## 2023-01-01 RX ORDER — HYDROCODONE BITARTRATE AND ACETAMINOPHEN 5; 325 MG/1; MG/1
1 TABLET ORAL EVERY 8 HOURS PRN
Status: DISCONTINUED | OUTPATIENT
Start: 2024-01-01 | End: 2023-01-01 | Stop reason: HOSPADM

## 2023-01-01 RX ORDER — FUROSEMIDE 10 MG/ML
40 INJECTION INTRAMUSCULAR; INTRAVENOUS
Status: DISCONTINUED | OUTPATIENT
Start: 2023-01-01 | End: 2023-01-01 | Stop reason: HOSPADM

## 2023-01-01 RX ORDER — DIPHENHYDRAMINE HCL 25 MG
25 CAPSULE ORAL EVERY 6 HOURS PRN
Qty: 30 CAPSULE | Refills: 1 | Status: SHIPPED | OUTPATIENT
Start: 2023-01-01 | End: 2024-01-01 | Stop reason: SDUPTHER

## 2023-01-01 RX ORDER — CARBOXYMETHYLCELLULOSE SODIUM 5 MG/ML
3-4 SOLUTION/ DROPS OPHTHALMIC EVERY MORNING
COMMUNITY
End: 2024-01-01 | Stop reason: SDUPTHER

## 2023-01-01 RX ORDER — DIPHENHYDRAMINE HYDROCHLORIDE 50 MG/ML
12.5 INJECTION INTRAMUSCULAR; INTRAVENOUS ONCE
Status: COMPLETED | OUTPATIENT
Start: 2023-01-01 | End: 2023-01-01

## 2023-01-01 RX ORDER — METOCLOPRAMIDE HYDROCHLORIDE 5 MG/ML
10 INJECTION INTRAMUSCULAR; INTRAVENOUS ONCE
Status: COMPLETED | OUTPATIENT
Start: 2023-01-01 | End: 2023-01-01

## 2023-01-01 RX ORDER — LORAZEPAM 2 MG/ML
2 INJECTION INTRAMUSCULAR EVERY 4 HOURS PRN
Status: DISCONTINUED | OUTPATIENT
Start: 2023-01-01 | End: 2023-01-01

## 2023-01-01 RX ORDER — CITALOPRAM 40 MG/1
40 TABLET ORAL NIGHTLY
Status: DISCONTINUED | OUTPATIENT
Start: 2023-01-01 | End: 2023-01-01

## 2023-01-01 RX ORDER — DIPHENHYDRAMINE HYDROCHLORIDE 50 MG/ML
25 INJECTION INTRAMUSCULAR; INTRAVENOUS EVERY 6 HOURS PRN
Status: DISCONTINUED | OUTPATIENT
Start: 2023-01-01 | End: 2023-01-01 | Stop reason: HOSPADM

## 2023-01-01 RX ORDER — FUROSEMIDE 20 MG/1
20 TABLET ORAL 2 TIMES DAILY PRN
Qty: 90 TABLET | Refills: 1 | Status: SHIPPED
Start: 2023-01-01 | End: 2023-01-01

## 2023-01-01 RX ORDER — HYDROCODONE BITARTRATE AND ACETAMINOPHEN 7.5; 325 MG/1; MG/1
1 TABLET ORAL EVERY 8 HOURS PRN
Qty: 90 TABLET | Refills: 0 | Status: SHIPPED | OUTPATIENT
Start: 2023-01-01 | End: 2023-01-01 | Stop reason: SDUPTHER

## 2023-01-01 RX ORDER — DIAZEPAM 5 MG/ML
2.5 INJECTION, SOLUTION INTRAMUSCULAR; INTRAVENOUS EVERY 6 HOURS PRN
Status: DISCONTINUED | OUTPATIENT
Start: 2023-01-01 | End: 2023-01-01 | Stop reason: HOSPADM

## 2023-01-01 RX ORDER — SODIUM CHLORIDE, SODIUM LACTATE, POTASSIUM CHLORIDE, CALCIUM CHLORIDE 600; 310; 30; 20 MG/100ML; MG/100ML; MG/100ML; MG/100ML
1000 INJECTION, SOLUTION INTRAVENOUS ONCE
Status: COMPLETED | OUTPATIENT
Start: 2023-01-01 | End: 2023-01-01

## 2023-01-01 RX ORDER — SCOLOPAMINE TRANSDERMAL SYSTEM 1 MG/1
1 PATCH, EXTENDED RELEASE TRANSDERMAL
Qty: 10 PATCH | Refills: 1 | Status: SHIPPED | OUTPATIENT
Start: 2023-01-01 | End: 2024-01-01 | Stop reason: SDUPTHER

## 2023-01-01 RX ORDER — CITALOPRAM 40 MG/1
40 TABLET ORAL DAILY
Qty: 90 TABLET | Refills: 3 | Status: SHIPPED | OUTPATIENT
Start: 2023-01-01 | End: 2024-01-01 | Stop reason: SDUPTHER

## 2023-01-01 RX ORDER — ACETAMINOPHEN 500 MG
500 TABLET ORAL
COMMUNITY
End: 2024-01-01

## 2023-01-01 RX ORDER — LABETALOL HYDROCHLORIDE 5 MG/ML
10 INJECTION, SOLUTION INTRAVENOUS EVERY 6 HOURS PRN
Status: DISCONTINUED | OUTPATIENT
Start: 2023-01-01 | End: 2023-01-01 | Stop reason: HOSPADM

## 2023-01-01 RX ORDER — POTASSIUM CHLORIDE 750 MG/1
TABLET, FILM COATED, EXTENDED RELEASE ORAL
COMMUNITY
Start: 2022-12-22 | End: 2023-01-01

## 2023-01-01 RX ORDER — ONDANSETRON 2 MG/ML
4 INJECTION INTRAMUSCULAR; INTRAVENOUS EVERY 6 HOURS PRN
Status: DISCONTINUED | OUTPATIENT
Start: 2023-01-01 | End: 2023-01-01

## 2023-01-01 RX ORDER — PANTOPRAZOLE SODIUM 40 MG/10ML
40 INJECTION, POWDER, LYOPHILIZED, FOR SOLUTION INTRAVENOUS 2 TIMES DAILY
Status: DISCONTINUED | OUTPATIENT
Start: 2023-01-01 | End: 2023-01-01

## 2023-01-01 RX ORDER — SODIUM CHLORIDE 9 MG/ML
INJECTION, SOLUTION INTRAVENOUS CONTINUOUS
Status: DISCONTINUED | OUTPATIENT
Start: 2023-01-01 | End: 2023-01-01

## 2023-01-01 RX ORDER — AZELASTINE HYDROCHLORIDE 137 UG/1
1 SPRAY, METERED NASAL DAILY
Status: DISCONTINUED | OUTPATIENT
Start: 2023-01-01 | End: 2023-01-01

## 2023-01-01 RX ORDER — DOXYCYCLINE HYCLATE 100 MG/1
CAPSULE ORAL
COMMUNITY
End: 2023-01-01

## 2023-01-01 RX ORDER — PRAVASTATIN SODIUM 20 MG
20 TABLET ORAL
Qty: 90 TABLET | Refills: 3 | Status: SHIPPED | OUTPATIENT
Start: 2023-01-01 | End: 2024-01-01

## 2023-01-01 RX ORDER — CEFTRIAXONE 1 G/1
1000 INJECTION, POWDER, FOR SOLUTION INTRAMUSCULAR; INTRAVENOUS ONCE
Status: COMPLETED | OUTPATIENT
Start: 2023-01-01 | End: 2023-01-01

## 2023-01-01 RX ORDER — FUROSEMIDE 40 MG/1
40 TABLET ORAL
Status: DISCONTINUED | OUTPATIENT
Start: 2023-01-01 | End: 2023-01-01

## 2023-01-01 RX ORDER — ONDANSETRON 2 MG/ML
4 INJECTION INTRAMUSCULAR; INTRAVENOUS ONCE
Status: COMPLETED | OUTPATIENT
Start: 2023-01-01 | End: 2023-01-01

## 2023-01-01 RX ORDER — HYDRALAZINE HYDROCHLORIDE 20 MG/ML
10 INJECTION INTRAMUSCULAR; INTRAVENOUS EVERY 6 HOURS PRN
Status: DISCONTINUED | OUTPATIENT
Start: 2023-01-01 | End: 2023-01-01 | Stop reason: HOSPADM

## 2023-01-01 RX ORDER — URSODIOL 300 MG/1
300 CAPSULE ORAL 2 TIMES DAILY WITH MEALS
Status: DISCONTINUED | OUTPATIENT
Start: 2023-01-01 | End: 2023-01-01 | Stop reason: HOSPADM

## 2023-01-01 RX ORDER — MAGNESIUM SULFATE HEPTAHYDRATE 40 MG/ML
2 INJECTION, SOLUTION INTRAVENOUS ONCE
Status: COMPLETED | OUTPATIENT
Start: 2023-01-01 | End: 2023-01-01

## 2023-01-01 RX ORDER — OMEPRAZOLE 20 MG/1
20 TABLET, DELAYED RELEASE ORAL DAILY
COMMUNITY
End: 2024-01-01 | Stop reason: SDUPTHER

## 2023-01-01 RX ORDER — FUROSEMIDE 10 MG/ML
40 INJECTION INTRAMUSCULAR; INTRAVENOUS
Status: DISCONTINUED | OUTPATIENT
Start: 2023-01-01 | End: 2023-01-01

## 2023-01-01 RX ORDER — POTASSIUM CHLORIDE 750 MG/1
20 TABLET, FILM COATED, EXTENDED RELEASE ORAL DAILY
Qty: 180 TABLET | Refills: 3 | Status: SHIPPED | OUTPATIENT
Start: 2023-01-01 | End: 2023-01-01

## 2023-01-01 RX ORDER — HYDROCODONE BITARTRATE AND ACETAMINOPHEN 7.5; 325 MG/1; MG/1
1 TABLET ORAL EVERY 8 HOURS PRN
Qty: 90 TABLET | Refills: 0 | Status: SHIPPED | OUTPATIENT
Start: 2023-01-01 | End: 2024-01-01

## 2023-01-01 RX ADMIN — FUROSEMIDE 40 MG: 10 INJECTION, SOLUTION INTRAMUSCULAR; INTRAVENOUS at 05:44

## 2023-01-01 RX ADMIN — DIAZEPAM 2.5 MG: 10 INJECTION, SOLUTION INTRAMUSCULAR; INTRAVENOUS at 21:03

## 2023-01-01 RX ADMIN — URSODIOL 300 MG: 300 CAPSULE ORAL at 09:14

## 2023-01-01 RX ADMIN — CITALOPRAM 40 MG: 40 TABLET, FILM COATED ORAL at 09:33

## 2023-01-01 RX ADMIN — MIDAZOLAM 1 MG: 1 INJECTION, SOLUTION INTRAMUSCULAR; INTRAVENOUS at 06:15

## 2023-01-01 RX ADMIN — POTASSIUM PHOSPHATE, MONOBASIC POTASSIUM PHOSPHATE, DIBASIC 30 MMOL: 224; 236 INJECTION, SOLUTION, CONCENTRATE INTRAVENOUS at 08:59

## 2023-01-01 RX ADMIN — ONDANSETRON 4 MG: 2 INJECTION INTRAMUSCULAR; INTRAVENOUS at 21:51

## 2023-01-01 RX ADMIN — DIPHENHYDRAMINE HYDROCHLORIDE 25 MG: 50 INJECTION, SOLUTION INTRAMUSCULAR; INTRAVENOUS at 03:21

## 2023-01-01 RX ADMIN — PROMETHAZINE HYDROCHLORIDE 25 MG: 25 TABLET ORAL at 17:55

## 2023-01-01 RX ADMIN — MAGNESIUM SULFATE HEPTAHYDRATE 2 G: 2 INJECTION, SOLUTION INTRAVENOUS at 12:58

## 2023-01-01 RX ADMIN — ONDANSETRON 4 MG: 2 INJECTION INTRAMUSCULAR; INTRAVENOUS at 04:51

## 2023-01-01 RX ADMIN — OMEPRAZOLE 20 MG: 20 CAPSULE, DELAYED RELEASE ORAL at 09:33

## 2023-01-01 RX ADMIN — OMEPRAZOLE 20 MG: 20 CAPSULE, DELAYED RELEASE ORAL at 17:28

## 2023-01-01 RX ADMIN — SODIUM CHLORIDE: 9 INJECTION, SOLUTION INTRAVENOUS at 17:41

## 2023-01-01 RX ADMIN — SCOPOLAMINE 1 PATCH: 1.5 PATCH, EXTENDED RELEASE TRANSDERMAL at 12:54

## 2023-01-01 RX ADMIN — URSODIOL 300 MG: 300 CAPSULE ORAL at 09:35

## 2023-01-01 RX ADMIN — URSODIOL 300 MG: 300 CAPSULE ORAL at 15:57

## 2023-01-01 RX ADMIN — PANTOPRAZOLE SODIUM 40 MG: 40 INJECTION, POWDER, FOR SOLUTION INTRAVENOUS at 05:41

## 2023-01-01 RX ADMIN — CEFTRIAXONE SODIUM 1000 MG: 10 INJECTION, POWDER, FOR SOLUTION INTRAVENOUS at 17:28

## 2023-01-01 RX ADMIN — DIAZEPAM 2.5 MG: 10 INJECTION, SOLUTION INTRAMUSCULAR; INTRAVENOUS at 22:55

## 2023-01-01 RX ADMIN — PANTOPRAZOLE SODIUM 40 MG: 40 INJECTION, POWDER, FOR SOLUTION INTRAVENOUS at 13:03

## 2023-01-01 RX ADMIN — PRAVASTATIN SODIUM 20 MG: 20 TABLET ORAL at 21:28

## 2023-01-01 RX ADMIN — SCOPOLAMINE 1 PATCH: 1.5 PATCH, EXTENDED RELEASE TRANSDERMAL at 12:52

## 2023-01-01 RX ADMIN — DIPHENHYDRAMINE HYDROCHLORIDE 12.5 MG: 50 INJECTION, SOLUTION INTRAMUSCULAR; INTRAVENOUS at 00:51

## 2023-01-01 RX ADMIN — FUROSEMIDE 20 MG: 10 INJECTION, SOLUTION INTRAMUSCULAR; INTRAVENOUS at 05:41

## 2023-01-01 RX ADMIN — DIPHENHYDRAMINE HYDROCHLORIDE 12.5 MG: 50 INJECTION, SOLUTION INTRAMUSCULAR; INTRAVENOUS at 03:54

## 2023-01-01 RX ADMIN — AMLODIPINE BESYLATE 5 MG: 5 TABLET ORAL at 15:55

## 2023-01-01 RX ADMIN — LOSARTAN POTASSIUM 100 MG: 50 TABLET, FILM COATED ORAL at 09:33

## 2023-01-01 RX ADMIN — SODIUM CHLORIDE: 9 INJECTION, SOLUTION INTRAVENOUS at 03:01

## 2023-01-01 RX ADMIN — DIPHENHYDRAMINE HYDROCHLORIDE 25 MG: 50 INJECTION, SOLUTION INTRAMUSCULAR; INTRAVENOUS at 15:50

## 2023-01-01 RX ADMIN — MIDAZOLAM 0.5 MG: 1 INJECTION, SOLUTION INTRAMUSCULAR; INTRAVENOUS at 16:49

## 2023-01-01 RX ADMIN — URSODIOL 300 MG: 300 CAPSULE ORAL at 09:51

## 2023-01-01 RX ADMIN — DIPHENHYDRAMINE HYDROCHLORIDE 25 MG: 50 INJECTION, SOLUTION INTRAMUSCULAR; INTRAVENOUS at 06:27

## 2023-01-01 RX ADMIN — SODIUM CHLORIDE, POTASSIUM CHLORIDE, SODIUM LACTATE AND CALCIUM CHLORIDE 1000 ML: 600; 310; 30; 20 INJECTION, SOLUTION INTRAVENOUS at 10:39

## 2023-01-01 RX ADMIN — MIDAZOLAM 0.5 MG: 1 INJECTION, SOLUTION INTRAMUSCULAR; INTRAVENOUS at 23:30

## 2023-01-01 RX ADMIN — POTASSIUM PHOSPHATE, MONOBASIC POTASSIUM PHOSPHATE, DIBASIC 30 MMOL: 224; 236 INJECTION, SOLUTION, CONCENTRATE INTRAVENOUS at 11:57

## 2023-01-01 RX ADMIN — PANTOPRAZOLE SODIUM 40 MG: 40 INJECTION, POWDER, FOR SOLUTION INTRAVENOUS at 16:56

## 2023-01-01 RX ADMIN — LOSARTAN POTASSIUM 100 MG: 50 TABLET, FILM COATED ORAL at 21:28

## 2023-01-01 RX ADMIN — DIPHENHYDRAMINE HYDROCHLORIDE 25 MG: 50 INJECTION, SOLUTION INTRAMUSCULAR; INTRAVENOUS at 11:45

## 2023-01-01 RX ADMIN — CITALOPRAM 40 MG: 40 TABLET, FILM COATED ORAL at 05:41

## 2023-01-01 RX ADMIN — FUROSEMIDE 40 MG: 10 INJECTION, SOLUTION INTRAMUSCULAR; INTRAVENOUS at 04:37

## 2023-01-01 RX ADMIN — PANTOPRAZOLE SODIUM 40 MG: 40 INJECTION, POWDER, FOR SOLUTION INTRAVENOUS at 05:43

## 2023-01-01 RX ADMIN — FUROSEMIDE 20 MG: 10 INJECTION, SOLUTION INTRAVENOUS at 08:56

## 2023-01-01 RX ADMIN — CEFTRIAXONE SODIUM 1000 MG: 10 INJECTION, POWDER, FOR SOLUTION INTRAVENOUS at 17:49

## 2023-01-01 RX ADMIN — DIAZEPAM 2.5 MG: 10 INJECTION, SOLUTION INTRAMUSCULAR; INTRAVENOUS at 20:55

## 2023-01-01 RX ADMIN — ONDANSETRON 4 MG: 2 INJECTION INTRAMUSCULAR; INTRAVENOUS at 11:27

## 2023-01-01 RX ADMIN — AMLODIPINE BESYLATE 5 MG: 5 TABLET ORAL at 15:03

## 2023-01-01 RX ADMIN — CEFTRIAXONE SODIUM 1000 MG: 10 INJECTION, POWDER, FOR SOLUTION INTRAVENOUS at 16:56

## 2023-01-01 RX ADMIN — AMLODIPINE BESYLATE 5 MG: 5 TABLET ORAL at 05:41

## 2023-01-01 RX ADMIN — URSODIOL 300 MG: 300 CAPSULE ORAL at 16:57

## 2023-01-01 RX ADMIN — PROMETHAZINE HYDROCHLORIDE 25 MG: 25 TABLET ORAL at 15:03

## 2023-01-01 RX ADMIN — METOCLOPRAMIDE 10 MG: 5 INJECTION, SOLUTION INTRAMUSCULAR; INTRAVENOUS at 13:06

## 2023-01-01 RX ADMIN — IOHEXOL 100 ML: 350 INJECTION, SOLUTION INTRAVENOUS at 12:47

## 2023-01-01 RX ADMIN — PANTOPRAZOLE SODIUM 40 MG: 40 INJECTION, POWDER, FOR SOLUTION INTRAVENOUS at 17:49

## 2023-01-01 RX ADMIN — ENOXAPARIN SODIUM 40 MG: 100 INJECTION SUBCUTANEOUS at 18:49

## 2023-01-01 RX ADMIN — CEFTRIAXONE SODIUM 1000 MG: 1 INJECTION, POWDER, FOR SOLUTION INTRAMUSCULAR; INTRAVENOUS at 15:20

## 2023-01-01 RX ADMIN — LOSARTAN POTASSIUM 100 MG: 50 TABLET, FILM COATED ORAL at 05:41

## 2023-01-01 ASSESSMENT — ENCOUNTER SYMPTOMS
NERVOUS/ANXIOUS: 1
CONSTIPATION: 0
BACK PAIN: 0
BACK PAIN: 0
VOMITING: 1
PSYCHIATRIC NEGATIVE: 1
VOMITING: 1
HEADACHES: 1
WEAKNESS: 1
PSYCHIATRIC NEGATIVE: 1
SHORTNESS OF BREATH: 1
SHORTNESS OF BREATH: 1
FEVER: 0
SHORTNESS OF BREATH: 1
NAUSEA: 1
NEUROLOGICAL NEGATIVE: 1
BLOOD IN STOOL: 0
HEARTBURN: 1
HEADACHES: 0
GASTROINTESTINAL NEGATIVE: 1
CHILLS: 0
WEAKNESS: 1
SORE THROAT: 0
SPEECH CHANGE: 0
GASTROINTESTINAL NEGATIVE: 1
HEADACHES: 0
FEVER: 0
SORE THROAT: 0
SORE THROAT: 0
FEVER: 0
NECK PAIN: 0
COUGH: 0
SORE THROAT: 0
COUGH: 0
DIAPHORESIS: 0
SHORTNESS OF BREATH: 0
COUGH: 0
CHILLS: 0
FLANK PAIN: 0
HEARTBURN: 1
NEUROLOGICAL NEGATIVE: 1
NERVOUS/ANXIOUS: 0
DIZZINESS: 0
NEUROLOGICAL NEGATIVE: 1
BLOOD IN STOOL: 0
FEVER: 0
NECK PAIN: 0
DIZZINESS: 0
NERVOUS/ANXIOUS: 1
GASTROINTESTINAL NEGATIVE: 1
VOMITING: 0
WHEEZING: 0
NEUROLOGICAL NEGATIVE: 1
PALPITATIONS: 0
DIZZINESS: 0
SENSORY CHANGE: 0
SHORTNESS OF BREATH: 0
NAUSEA: 1
RESPIRATORY NEGATIVE: 1
WEAKNESS: 1
ABDOMINAL PAIN: 1
MYALGIAS: 0
NERVOUS/ANXIOUS: 1
BLURRED VISION: 0
MYALGIAS: 0
NAUSEA: 0
CHILLS: 0
PALPITATIONS: 0
CHILLS: 0
FLANK PAIN: 0
DIAPHORESIS: 0
SPEECH CHANGE: 0
SENSORY CHANGE: 0
BLOOD IN STOOL: 0
PALPITATIONS: 0
FEVER: 0
DIAPHORESIS: 0
SPEECH CHANGE: 0
FOCAL WEAKNESS: 0
HEADACHES: 0
FOCAL WEAKNESS: 0
NAUSEA: 1
FEVER: 0
FEVER: 0
PSYCHIATRIC NEGATIVE: 1
CHILLS: 0
WHEEZING: 0
FLANK PAIN: 0
SHORTNESS OF BREATH: 0
MUSCULOSKELETAL NEGATIVE: 1
PSYCHIATRIC NEGATIVE: 1
FOCAL WEAKNESS: 0
CHILLS: 0
WEIGHT LOSS: 1
VOMITING: 1
CHILLS: 0
MYALGIAS: 0
SORE THROAT: 0
SINUS PAIN: 0
BLURRED VISION: 0
NECK PAIN: 0
EYES NEGATIVE: 1
BACK PAIN: 0
CARDIOVASCULAR NEGATIVE: 1
SENSORY CHANGE: 0
ABDOMINAL PAIN: 1
WHEEZING: 0
BLURRED VISION: 0
ABDOMINAL PAIN: 1
SORE THROAT: 0
HEARTBURN: 1
ABDOMINAL PAIN: 1

## 2023-01-01 ASSESSMENT — COGNITIVE AND FUNCTIONAL STATUS - GENERAL
DAILY ACTIVITIY SCORE: 21
STANDING UP FROM CHAIR USING ARMS: A LITTLE
TOILETING: A LITTLE
HELP NEEDED FOR BATHING: A LITTLE
SUGGESTED CMS G CODE MODIFIER DAILY ACTIVITY: CJ
MOVING TO AND FROM BED TO CHAIR: A LITTLE
WALKING IN HOSPITAL ROOM: TOTAL
CLIMB 3 TO 5 STEPS WITH RAILING: TOTAL
WALKING IN HOSPITAL ROOM: TOTAL
DRESSING REGULAR LOWER BODY CLOTHING: A LITTLE
MOBILITY SCORE: 6
MOVING TO AND FROM BED TO CHAIR: UNABLE
CLIMB 3 TO 5 STEPS WITH RAILING: TOTAL
TURNING FROM BACK TO SIDE WHILE IN FLAT BAD: UNABLE
STANDING UP FROM CHAIR USING ARMS: TOTAL
MOVING FROM LYING ON BACK TO SITTING ON SIDE OF FLAT BED: A LITTLE
SUGGESTED CMS G CODE MODIFIER MOBILITY: CK
SUGGESTED CMS G CODE MODIFIER MOBILITY: CN
MOVING FROM LYING ON BACK TO SITTING ON SIDE OF FLAT BED: UNABLE
MOBILITY SCORE: 15

## 2023-01-01 ASSESSMENT — LIFESTYLE VARIABLES
ON A TYPICAL DAY WHEN YOU DRINK ALCOHOL HOW MANY DRINKS DO YOU HAVE: 0
TOTAL SCORE: 0
HOW MANY TIMES IN THE PAST YEAR HAVE YOU HAD 5 OR MORE DRINKS IN A DAY: 0
HAVE PEOPLE ANNOYED YOU BY CRITICIZING YOUR DRINKING: NO
EVER FELT BAD OR GUILTY ABOUT YOUR DRINKING: NO
EVER HAD A DRINK FIRST THING IN THE MORNING TO STEADY YOUR NERVES TO GET RID OF A HANGOVER: NO
AVERAGE NUMBER OF DAYS PER WEEK YOU HAVE A DRINK CONTAINING ALCOHOL: 0
TOTAL SCORE: 0
CONSUMPTION TOTAL: NEGATIVE
HAVE YOU EVER FELT YOU SHOULD CUT DOWN ON YOUR DRINKING: NO
ALCOHOL_USE: NO
TOTAL SCORE: 0

## 2023-01-01 ASSESSMENT — PATIENT HEALTH QUESTIONNAIRE - PHQ9
2. FEELING DOWN, DEPRESSED, IRRITABLE, OR HOPELESS: NOT AT ALL
1. LITTLE INTEREST OR PLEASURE IN DOING THINGS: NOT AT ALL
CLINICAL INTERPRETATION OF PHQ2 SCORE: 0
SUM OF ALL RESPONSES TO PHQ9 QUESTIONS 1 AND 2: 0

## 2023-01-01 ASSESSMENT — FIBROSIS 4 INDEX
FIB4 SCORE: 1.25
FIB4 SCORE: 1.25
FIB4 SCORE: 1.28

## 2023-01-01 ASSESSMENT — PAIN DESCRIPTION - PAIN TYPE
TYPE: ACUTE PAIN

## 2023-01-05 ENCOUNTER — HOSPITAL ENCOUNTER (OUTPATIENT)
Dept: LAB | Facility: MEDICAL CENTER | Age: 83
End: 2023-01-05
Attending: SPECIALIST
Payer: MEDICARE

## 2023-01-05 ENCOUNTER — HOSPITAL ENCOUNTER (OUTPATIENT)
Dept: LAB | Facility: MEDICAL CENTER | Age: 83
End: 2023-01-05
Attending: INTERNAL MEDICINE
Payer: MEDICARE

## 2023-01-05 DIAGNOSIS — Z79.891 CHRONIC USE OF OPIATE FOR THERAPEUTIC PURPOSE: ICD-10-CM

## 2023-01-05 LAB — CANCER AG125 SERPL-ACNC: 214 U/ML (ref 0–35)

## 2023-01-05 PROCEDURE — 86304 IMMUNOASSAY TUMOR CA 125: CPT

## 2023-01-05 PROCEDURE — 36415 COLL VENOUS BLD VENIPUNCTURE: CPT

## 2023-01-05 PROCEDURE — G0481 DRUG TEST DEF 8-14 CLASSES: HCPCS

## 2023-01-10 LAB
1OH-MIDAZOLAM UR QL SCN: NOT DETECTED
6MAM UR QL: NOT DETECTED
7AMINOCLONAZEPAM UR QL: NOT DETECTED
A-OH ALPRAZ UR QL: NOT DETECTED
ALPRAZ UR QL: NOT DETECTED
AMPHET UR QL SCN: NOT DETECTED
ANNOTATION COMMENT IMP: NORMAL
ANNOTATION COMMENT IMP: NORMAL
BARBITURATES UR QL: NOT DETECTED
BUPRENORPHINE UR QL: NOT DETECTED
BZE UR QL: NOT DETECTED
CARBOXYTHC UR QL: NOT DETECTED
CARISOPRODOL UR QL: NOT DETECTED
CLONAZEPAM UR QL: NOT DETECTED
CODEINE UR QL: NOT DETECTED
DIAZEPAM UR QL: NOT DETECTED
ETHYL GLUCURONIDE UR QL: NOT DETECTED
FENTANYL UR QL: NOT DETECTED
GABAPENTIN UR QL: NOT DETECTED
HYDROCODONE UR QL: PRESENT
HYDROMORPHONE UR QL: PRESENT
LORAZEPAM UR QL: NOT DETECTED
MDA UR QL: NOT DETECTED
MDEA UR QL: NOT DETECTED
MDMA UR QL: NOT DETECTED
MEPERIDINE UR QL: NOT DETECTED
METHADONE UR QL: NOT DETECTED
METHAMPHET UR QL: NOT DETECTED
MIDAZOLAM UR QL SCN: NOT DETECTED
MORPHINE UR QL: NOT DETECTED
NALOXONE UR QL SCN: NOT DETECTED
NORBUPRENORPHINE UR QL CFM: NOT DETECTED
NORDIAZEPAM UR QL: NOT DETECTED
NORFENTANYL UR QL: NOT DETECTED
NORHYDROCODONE UR QL CFM: PRESENT
NOROXYCODONE UR QL CFM: NOT DETECTED
NOROXYMORPH CO100 Q0458: NOT DETECTED
OXAZEPAM UR QL: NOT DETECTED
OXYCODONE UR QL: NOT DETECTED
OXYMORPHONE UR QL: NOT DETECTED
PATHOLOGY STUDY: NORMAL
PCP UR QL: NOT DETECTED
PHENTERMINE UR QL: NOT DETECTED
PPAA UR QL: NOT DETECTED
PREGABALIN UR QL SCN: NOT DETECTED
SERVICE CMNT-IMP: NORMAL
TAPENADOL OSULF CO200 Q0473: NOT DETECTED
TAPENTADOL UR QL SCN: NOT DETECTED
TEMAZEPAM UR QL: NOT DETECTED
TRAMADOL UR QL: NOT DETECTED
ZOLPIDEM PHENYL-4-CARB UR QL SCN: NOT DETECTED
ZOLPIDEM UR QL: NOT DETECTED

## 2023-01-26 ENCOUNTER — PATIENT MESSAGE (OUTPATIENT)
Dept: CARDIOLOGY | Facility: MEDICAL CENTER | Age: 83
End: 2023-01-26
Payer: MEDICARE

## 2023-01-26 ENCOUNTER — TELEPHONE (OUTPATIENT)
Dept: CARDIOLOGY | Facility: MEDICAL CENTER | Age: 83
End: 2023-01-26
Payer: MEDICARE

## 2023-01-26 NOTE — TELEPHONE ENCOUNTER
"MyChart message sent to patient, awaiting patient response and will follow up as needed.       Per  last OV note, \" I would recommend we repeat a limited echo in January once she is fully recovered from COVID-pneumonia to see if the RV systolic pressure is improved.\"  "

## 2023-01-26 NOTE — TELEPHONE ENCOUNTER
TONY    Caller: Olive    Topic/issue: Pt called and wanted to know exactly why [facility] wanted her to get an echo done this coming Monday the 27th. Pt states that she had covid at that time and doesn't even remember having the virtual appt with her.    Callback Number: [phone]

## 2023-01-30 ENCOUNTER — APPOINTMENT (OUTPATIENT)
Dept: CARDIOLOGY | Facility: MEDICAL CENTER | Age: 83
End: 2023-01-30
Attending: INTERNAL MEDICINE
Payer: MEDICARE

## 2023-02-15 PROBLEM — R60.0 BILATERAL LOWER EXTREMITY EDEMA: Status: ACTIVE | Noted: 2023-01-01

## 2023-02-15 NOTE — ASSESSMENT & PLAN NOTE
Bilateral lower extremity edema: Uncertain etiology, most likely secondary to venous insufficiency, possible DVT due to prolonged immobilization.   - Increase Lasix to 40 mg daily.   - Obtain bilateral lower extremity duplex to evaluate for thrombosis.

## 2023-02-15 NOTE — PROGRESS NOTES
Subjective:     Chief Complaint   Patient presents with    Leg Swelling     Both legs     Medication Problem     Hydrocodone      Diagnoses of Bilateral lower extremity edema, Bilateral chronic knee pain, Lower extremity edema, and Chronic respiratory failure with hypoxia (HCC) were pertinent to this visit.    This visit was conducted via Zoom using secure and encrypted videoconferencing technology.   The patient was in their home in the [facility].    The patient's identity was confirmed and verbal consent was obtained for this virtual visit.     HPI: Olive is a pleasant 82 y.o. female who presents today for evaluation of    Problem   Bilateral Lower Extremity Edema    This is an ongoing problem, back in December patient has had mild swelling of the left lower extremity.  Has improved somewhat with use of Lasix 20 mg with potassium.  Over the last 2 to 3 weeks the swelling has returned and now present in bilateral lower extremities.  Unable to perform physical exam.  However, patient denies redness, swelling and calf pain.          Chronic Respiratory Failure (Hcc)    This is an ongoing problem, tested positive for COVID-19 9/4/2022 was discharged home with supplemental   Currently on 0.5 L/min as needed.  Saturating 94%.      Bilateral Chronic Knee Pain    Chronic knee pain on Norco 7.5-325 Q12H prn.   Pain is constant: weather makes it worse, patient is not able to ambulate due to multiple joint pain.  She is using a motorized scooter.  She is doing yoga/stretching 2-3 times per week.  PDMP reviewed today, no aberrant behavior, last fill of Norco was on 02/09/2023 , 60 tablets, 30-day supply.    Patient's pain has become progressively worse over the last few weeks.  She needed to take the third dose of Norco to help her alleviate the pain.  He is wondering if we can increase her dose from every 12 hours to every 8 hours.            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 90 Tablet 1    losartan (COZAAR) 100 MG Tab Take 1 Tablet by mouth every day. 90 Tablet 3    citalopram (CELEXA) 40 MG Tab Take 1 Tablet by mouth every day. 90 Tablet 3    potassium chloride ER (KLOR-CON) 10 MEQ tablet       amLODIPine (NORVASC) 5 MG Tab amlodipine 5 mg tablet   TAKE 1 TABLET BY MOUTH EVERY DAY      potassium chloride SA (K-DUR) 10 MEQ Tab CR potassium chloride ER 10 mEq tablet,extended release(part/cryst) Strength: 10 mEq 90 Tablet 1    pravastatin (PRAVACHOL) 20 MG Tab Take 1 Tablet by mouth at bedtime. 90 Tablet 3    ferrous sulfate 325 (65 Fe) MG tablet Take 1 Tablet by mouth 2 times a day. 180 Tablet 2    azelastine (ASTELIN) 137 MCG/SPRAY nasal spray Administer 1 Spray into affected nostril(S) 2 times a day. 30 mL 1    Multiple Vitamins-Minerals (PRESERVISION AREDS 2) Cap       ursodiol (ACTIGALL) 300 MG Cap Take 300 mg by mouth 2 times a day.      ascorbic acid (VITAMIN C) 100 MG tablet       vitamin D3, cholecalciferol, 400 UNIT Tab tablet Take 10 mcg by mouth every day.      B Complex-Folic Acid (B COMPLEX-VITAMIN B12 PO) Take 1 Tablet by mouth.       No current Epic-ordered facility-administered medications on file.     Review of Systems   Constitutional:  Negative for chills and fever.   Musculoskeletal:  Positive for joint pain.     Objective:     Exam:  /64   Pulse 94   SpO2 94%  There is no height or weight on file to calculate BMI.    Physical Exam  Vitals reviewed: Limited exam due to virtual  appointment..   Constitutional:       Appearance: Normal appearance.   Neurological:      Mental Status: She is alert.     Assessment & Plan:   Olive  is a pleasant 82 y.o. female with the following -     Problem List Items Addressed This Visit       Bilateral chronic knee pain (Chronic)     Chronic, constant moderate to severe pain in bilateral knees.  Will increase from every 12 to every 8 hours for better pain control.         Chronic respiratory failure (HCC) (Chronic)     Ongoing problem, weaning off oxygen, currently 1.5 L/min.  Saturating 94%.         Bilateral lower extremity edema     Bilateral lower extremity edema: Uncertain etiology, most likely secondary to venous insufficiency, possible DVT due to prolonged immobilization.   - Increase Lasix to 40 mg daily.   - Obtain bilateral lower extremity duplex to evaluate for thrombosis.         Relevant Orders    US-EXTREMITY VENOUS LOWER BILAT     Other Visit Diagnoses       Lower extremity edema        Relevant Medications    furosemide (LASIX) 20 MG Tab          Return if symptoms worsen or fail to improve.    Please note that this dictation was created using voice recognition software. I have made every reasonable attempt to correct obvious errors, but I expect that there are errors of grammar and possibly content that I did not discover before finalizing the note.

## 2023-02-15 NOTE — ASSESSMENT & PLAN NOTE
Chronic, constant moderate to severe pain in bilateral knees.  Will increase from every 12 to every 8 hours for better pain control.

## 2023-03-25 NOTE — ASSESSMENT & PLAN NOTE
This is a chronic problem, constant moderate to severe pain in bilateral knee joints.  Patient is taking Norco 7.5-325 mg every 12 hours as needed.  Controlled substance agreement on file, every 3 months appointment for medication refills.  Urine drug screen due in October 2022   [Fever] : no fever [Ear Pain] : no ear pain [Nasal Discharge] : nasal discharge [Nasal Congestion] : nasal congestion [Sore Throat] : sore throat [Cyanosis] : no cyanosis [Tachypnea] : not tachypneic [Wheezing] : no wheezing [Cough] : cough [Diarrhea] : no diarrhea [Vomiting] : no vomiting [Negative] : Genitourinary

## 2023-05-04 NOTE — TELEPHONE ENCOUNTER
Received request via: Patient     Was the patient seen in the last year in this department? Yes    Does the patient have an active prescription (recently filled or refills available) for medication(s) requested? NO    Does the patient have [facility] and need 100 day supply (blood pressure, diabetes and cholesterol meds only)? Patient does not have SCP     Requested Prescriptions     Pending Prescriptions Disp Refills    HYDROcodone-acetaminophen (NORCO) 7.5-325 MG tab 90 Tablet 0     Sig: Take 1 Tablet by mouth every 8 hours as needed for Moderate Pain or Severe Pain for up to 30 days.

## 2023-05-18 PROBLEM — J81.0 ACUTE PULMONARY EDEMA (HCC): Status: RESOLVED | Noted: 2022-09-05 | Resolved: 2023-01-01

## 2023-05-30 PROBLEM — K74.3 PRIMARY BILIARY CHOLANGITIS (HCC): Chronic | Status: ACTIVE | Noted: 2022-03-29

## 2023-05-30 PROBLEM — E66.01 MORBID OBESITY (HCC): Chronic | Status: ACTIVE | Noted: 2017-01-17

## 2023-05-30 NOTE — PROGRESS NOTES
Subjective:     CC:   Diagnoses of Chronic use of opiate for therapeutic purpose, Lower extremity edema, Essential hypertension, Nasal congestion, Primary biliary cholangitis (HCC), Pulmonary hypertension (HCC), Mild depression, Morbid obesity (HCC), Chronic respiratory failure with hypoxia (HCC), and Bilateral lower extremity edema were pertinent to this visit.    HPI: Olive is a pleasant 83 y.o. female who presents today with her daughter to follow-up on the following problems:  Problem   Bilateral Lower Extremity Edema    .   This is a chronic problem, symptoms started back in December.  Lasix 20 mg twice daily was somewhat helpful, however the lower extremity edema is progressing.  It is hard to adhere to low-sodium diet as patient eats at the restaurant in her building.           Pulmonary Hypertension (Hcc)    Echo RVSP estimated at 68 mmHg     Chronic Respiratory Failure (Hcc)    This is an ongoing problem, tested positive for COVID-19 9/4/2022 was discharged home with supplemental   Currently on 0.5 L/min as needed.  Saturating 94%.        Primary Biliary Cholangitis (Hcc)    Chronic, stable, on ursodiol.  Follows with GI.        Chronic Use of Opiate for Therapeutic Purpose    She is on Norco 7.5-325 every 8 hours as needed for her chronic knee and hip pain.  She tried physical therapy before, she does yoga and stretching at home.  Urine drug screen test was collected on Jam 5 2023  PDMP reviewed.  Last fill of Norco 7.5-325 on 5/4/2023, 90 tablets, 30-day supply.  No aberrant behavior.  Patient is aware of 3 months appointment policy       Mild Depression    Chart review: Chronic, stable on citalopram 40 mg tablet daily.  No SI/HI.           Essential Hypertension    Currently on amlodipine 5 mg and losartan 100 mg daily  BP has been on the higher side in the mornings 140s SBP.  Since we are increasing diuretics, expect BP to improve       Morbid Obesity (Hcc)    Body mass index is 43.07 kg/m².  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 180 Tablet 3    azelastine (ASTELIN) 137 MCG/SPRAY nasal spray Administer 1 Spray into affected nostril(S) 2 times a day. 90 mL 1    HYDROcodone-acetaminophen (NORCO) 7.5-325 MG tab Take 1 Tablet by mouth every 8 hours as needed for Moderate Pain or Severe Pain for up to 30 days. 90 Tablet 0    amLODIPine (NORVASC) 5 MG Tab TAKE ONE TABLET BY MOUTH DAILY 90 Tablet 3    losartan (COZAAR) 100 MG Tab Take 1 Tablet by mouth every day. 90 Tablet 3    citalopram (CELEXA) 40 MG Tab Take 1 Tablet by mouth every day. 90 Tablet 3    potassium chloride SA (K-DUR) 10 MEQ Tab CR potassium chloride ER 10 mEq tablet,extended release(part/cryst) Strength: 10 mEq 90 Tablet 1    pravastatin (PRAVACHOL) 20 MG Tab Take 1 Tablet by mouth at bedtime. 90 Tablet 3    ferrous sulfate 325 (65 Fe) MG tablet Take 1 Tablet by mouth 2 times a day. 180 Tablet 2    Multiple Vitamins-Minerals (PRESERVISION AREDS 2) Cap       ursodiol (ACTIGALL) 300 MG Cap Take 300 mg by mouth 2 times a day.      ascorbic acid (VITAMIN C) 100 MG tablet       vitamin D3, cholecalciferol, 400 UNIT Tab tablet Take 10 mcg by mouth every day.      B Complex-Folic Acid (B COMPLEX-VITAMIN B12 PO) Take 1 Tablet by mouth.       No current Epic-ordered facility-administered medications on file.     Health Maintenance: Patient declines bone density  scan.  She had adverse reaction to shingles vaccine, so she is not interested in second dose.  She also declines Tdap.     Review of Systems   Constitutional:  Negative for chills and fever.   HENT:  Negative for sore throat.    Respiratory:  Positive for shortness of breath.    Cardiovascular:  Positive for leg swelling. Negative for chest pain.   Gastrointestinal: Negative.    Musculoskeletal:  Positive for joint pain.   Neurological: Negative.    Psychiatric/Behavioral: Negative.       Objective:     Exam:  /76 (BP Location: Left arm, Patient Position: Sitting, BP Cuff Size: Adult)   Pulse 73   Temp 36.4 °C (97.5 °F) (Temporal)   SpO2 95%  There is no height or weight on file to calculate BMI.    Physical Exam  Constitutional:       Appearance: Normal appearance.   Cardiovascular:      Rate and Rhythm: Normal rate and regular rhythm.      Pulses: Normal pulses.      Heart sounds: Normal heart sounds. No murmur heard.  Pulmonary:      Effort: Pulmonary effort is normal.      Breath sounds: Normal breath sounds.      Comments: Nasal cannula, portable concentrator 0.5 L/min  Neurological:      Mental Status: She is alert.       Assessment & Plan:   Olive  is a pleasant 83 y.o. female with the following -     Problem List Items Addressed This Visit       Chronic respiratory failure (HCC) (Chronic)     Still oxygen dependent, multiple attempts to wean off are unsuccessful.  Patient requires 0.5 L/min to maintain saturation of 92 to 95%           Essential hypertension (Chronic)     Continue losartan 100 and amlodipine 5 mg daily.  Increase Lasix from 20 mg twice daily to 40 mg twice daily           Relevant Medications    furosemide (LASIX) 40 MG Tab    Mild depression (Chronic)     Chart eview: Chronic, stable, controlled on Celexa 40 mg daily.             Morbid obesity (HCC) (Chronic)     Chronic, associated with hypertension and osteoarthritis.  Patient tries to adhere to healthy diet, unable to  exercise.           Primary biliary cholangitis (HCC) (Chronic)     Chart review: Chronic, stable, follow-up with GI, continue ursodiol.           Pulmonary hypertension (HCC) (Chronic)     From chart review: RVSP 68 mmHg.  Stable oxygen requirement: 0.5 L/min.  Follow-up with cardiology and pulmonology.             Relevant Medications    furosemide (LASIX) 40 MG Tab    Bilateral lower extremity edema    Chronic use of opiate for therapeutic purpose     Chronic, stable, on Norco 7.5-325 mg every 8 hours as needed.  Was previously on every 12 hours, however the regimen was changed due to suboptimal pain control.   -Continue Norco every 8 hours as needed   - Follow-up in 3 months   - Consider referral to pain medicine            Other Visit Diagnoses       Lower extremity edema        Relevant Medications    furosemide (LASIX) 40 MG Tab    Nasal congestion        Relevant Medications    azelastine (ASTELIN) 137 MCG/SPRAY nasal spray          Return in about 3 months (around 8/30/2023), or if symptoms worsen or fail to improve, for med refill .    Please note that this dictation was created using voice recognition software. I have made every reasonable attempt to correct obvious errors, but I expect that there are errors of grammar and possibly content that I did not discover before finalizing the note.

## 2023-05-30 NOTE — ASSESSMENT & PLAN NOTE
Chronic, stable, on Norco 7.5-325 mg every 8 hours as needed.  Was previously on every 12 hours, however the regimen was changed due to suboptimal pain control.   -Continue Norco every 8 hours as needed   - Follow-up in 3 months   - Consider referral to pain medicine

## 2023-05-30 NOTE — ASSESSMENT & PLAN NOTE
From chart review: RVSP 68 mmHg.  Stable oxygen requirement: 0.5 L/min.  Follow-up with cardiology and pulmonology.

## 2023-05-30 NOTE — ASSESSMENT & PLAN NOTE
Still oxygen dependent, multiple attempts to wean off are unsuccessful.  Patient requires 0.5 L/min to maintain saturation of 92 to 95%

## 2023-05-30 NOTE — ASSESSMENT & PLAN NOTE
Chronic, associated with hypertension and osteoarthritis.  Patient tries to adhere to healthy diet, unable to exercise.

## 2023-05-30 NOTE — ASSESSMENT & PLAN NOTE
Continue losartan 100 and amlodipine 5 mg daily.  Increase Lasix from 20 mg twice daily to 40 mg twice daily

## 2023-08-31 NOTE — PROGRESS NOTES
Subjective:     CC:   Chief Complaint   Patient presents with    Medication Refill     Diagnoses of Bilateral chronic knee pain, Chronic use of opiate for therapeutic purpose, Mixed hyperlipidemia, Essential hypertension, and Vitamin D deficiency were pertinent to this visit.    HPI: Olive is a pleasant 83 y.o. female who presents today for medication follow-up:    Problem   Chronic Use of Opiate for Therapeutic Purpose    She is on Norco 7.5-325 every 8 hours as needed for her chronic knee and hip pain.  She tried physical therapy before, she does yoga and stretching at home.  Urine drug screen test was collected on Jan 5 2023  PDMP reviewed.  Last fill of Norco 7.5-325 on 08/07/2023 90 tablets, 30-day supply.  No aberrant behavior.  Patient is aware of 3 months appointment policy.   CS and opioid consent on file.     Essential Hypertension    Currently on amlodipine 5 mg and losartan 100 mg daily and furosemide 40 mg twice daily as needed home BP Runs in 120s over 60s at home.         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 90 Tablet 3    [START ON 11/8/2023] HYDROcodone-acetaminophen (NORCO) 7.5-325 MG tab Take 1 Tablet by mouth every 8 hours as needed for Moderate Pain or Severe Pain for up to 30 days. 90 Tablet 0    [START ON 10/8/2023] HYDROcodone-acetaminophen (NORCO) 7.5-325 MG tab Take 1 Tablet by mouth every 8 hours as needed for Moderate Pain or Severe Pain for up  "to 30 days. 90 Tablet 0    [START ON 9/7/2023] HYDROcodone-acetaminophen (NORCO) 7.5-325 MG tab Take 1 Tablet by mouth every 8 hours as needed for Moderate Pain or Severe Pain for up to 30 days. 90 Tablet 0    furosemide (LASIX) 40 MG Tab Take 1 Tablet by mouth 2 times a day as needed (LE edema). 180 Tablet 3    azelastine (ASTELIN) 137 MCG/SPRAY nasal spray Administer 1 Spray into affected nostril(S) 2 times a day. 90 mL 1    amLODIPine (NORVASC) 5 MG Tab TAKE ONE TABLET BY MOUTH DAILY 90 Tablet 3    losartan (COZAAR) 100 MG Tab Take 1 Tablet by mouth every day. 90 Tablet 3    citalopram (CELEXA) 40 MG Tab Take 1 Tablet by mouth every day. 90 Tablet 3    potassium chloride SA (K-DUR) 10 MEQ Tab CR potassium chloride ER 10 mEq tablet,extended release(part/cryst) Strength: 10 mEq 90 Tablet 1    ferrous sulfate 325 (65 Fe) MG tablet Take 1 Tablet by mouth 2 times a day. 180 Tablet 2    Multiple Vitamins-Minerals (PRESERVISION AREDS 2) Cap       ursodiol (ACTIGALL) 300 MG Cap Take 300 mg by mouth 2 times a day.      ascorbic acid (VITAMIN C) 100 MG tablet       vitamin D3, cholecalciferol, 400 UNIT Tab tablet Take 10 mcg by mouth every day.      B Complex-Folic Acid (B COMPLEX-VITAMIN B12 PO) Take 1 Tablet by mouth.       No current Epic-ordered facility-administered medications on file.     Review of Systems   Constitutional:  Negative for chills and fever.   HENT:  Negative for sore throat.    Respiratory:  Positive for shortness of breath.    Cardiovascular:  Negative for chest pain and leg swelling.   Gastrointestinal: Negative.    Musculoskeletal:  Positive for joint pain.   Neurological: Negative.    Psychiatric/Behavioral: Negative.       Objective:     Exam:  /70 (BP Location: Right arm, Patient Position: Sitting, BP Cuff Size: Adult)   Pulse 67   Temp 37.1 °C (98.8 °F) (Temporal)   Resp 17   Ht 1.702 m (5' 7\")   Wt 118 kg (260 lb)   SpO2 96%   BMI 40.72 kg/m²  Body mass index is 40.72 " kg/m².    Physical Exam  Constitutional:       Appearance: Normal appearance.   Cardiovascular:      Rate and Rhythm: Normal rate and regular rhythm.      Pulses: Normal pulses.      Heart sounds: Normal heart sounds. No murmur heard.  Pulmonary:      Effort: Pulmonary effort is normal. No respiratory distress.      Breath sounds: Normal breath sounds. No wheezing.      Comments: Nasal cannula, portable concentrator 0.5 L/min  Musculoskeletal:      Right knee: Deformity present. No swelling. Decreased range of motion.      Left knee: Deformity present. No swelling. Decreased range of motion.   Neurological:      Mental Status: She is alert.       Labs: pending     Assessment & Plan:   Olive  is a pleasant 83 y.o. female with the following -     Problem List Items Addressed This Visit       Bilateral chronic knee pain (Chronic)    Relevant Medications    HYDROcodone-acetaminophen (NORCO) 7.5-325 MG tab (Start on 11/8/2023)    HYDROcodone-acetaminophen (NORCO) 7.5-325 MG tab (Start on 10/8/2023)    HYDROcodone-acetaminophen (NORCO) 7.5-325 MG tab (Start on 9/7/2023)    Other Relevant Orders    DX-KNEE 3 VIEWS Atraumatic Pain/Swelling/Deformity    DX-KNEE 3 VIEWS Atraumatic Pain/Swelling/Deformity    Essential hypertension (Chronic)     The chronic problem, better controlled with diuretic, lower extremity edema has improved.  BP better controlled.  Continue current regimen.           Relevant Medications    pravastatin (PRAVACHOL) 20 MG Tab    Other Relevant Orders    CBC WITH DIFFERENTIAL    Comp Metabolic Panel    TSH WITH REFLEX TO FT4    Chronic use of opiate for therapeutic purpose     New problem, stable, uncomplicated.  Patient is on Norco 7.5 every 8 hours as needed.  Her pain is primarily in bilateral knees, limited range of motion, crepitus on palpation.  Pending x-rays of bilateral knees.         Relevant Medications    HYDROcodone-acetaminophen (NORCO) 7.5-325 MG tab (Start on 11/8/2023)     HYDROcodone-acetaminophen (NORCO) 7.5-325 MG tab (Start on 10/8/2023)    HYDROcodone-acetaminophen (NORCO) 7.5-325 MG tab (Start on 9/7/2023)    Other Relevant Orders    Consent for Opiate Prescription (Completed)    Hyperlipidemia    Relevant Medications    pravastatin (PRAVACHOL) 20 MG Tab    Other Relevant Orders    Lipid Profile     Other Visit Diagnoses       Vitamin D deficiency        Relevant Orders    VITAMIN D,25 HYDROXY (DEFICIENCY)          Return in about 3 months (around 11/30/2023), or if symptoms worsen or fail to improve.    Please note that this dictation was created using voice recognition software. I have made every reasonable attempt to correct obvious errors, but I expect that there are errors of grammar and possibly content that I did not discover before finalizing the note.

## 2023-08-31 NOTE — ASSESSMENT & PLAN NOTE
New problem, stable, uncomplicated.  Patient is on Norco 7.5 every 8 hours as needed.  Her pain is primarily in bilateral knees, limited range of motion, crepitus on palpation.  Pending x-rays of bilateral knees.

## 2023-08-31 NOTE — ASSESSMENT & PLAN NOTE
The chronic problem, better controlled with diuretic, lower extremity edema has improved.  BP better controlled.  Continue current regimen.

## 2023-11-27 PROBLEM — Z86.001 HISTORY OF CARCINOMA IN SITU OF CERVIX UTERI: Status: ACTIVE | Noted: 2019-01-19

## 2023-11-27 PROBLEM — Z85.9 HISTORY OF MALIGNANT CARCINOID TUMOR: Status: ACTIVE | Noted: 2019-02-05

## 2023-11-27 PROBLEM — E04.1 THYROID NODULE: Status: ACTIVE | Noted: 2023-01-01

## 2023-11-27 NOTE — PROGRESS NOTES
Subjective:     CC:   Chief Complaint   Patient presents with    Itching     Diagnoses of Thyroid nodule, Chronic use of opiate for therapeutic purpose, Impacted cerumen of both ears, Mild depression, Essential hypertension, History of carcinoma in situ of cervix uteri, and History of malignant carcinoid tumor were pertinent to this visit.      HPI: Olive is a pleasant 83 y.o. female who presents today with her  Jared to discuss the following problems:    Problem   Thyroid Nodule    IMPRESSION:     1. Two TR4 nodules in the right thyroid gland, measuring up to 1.4 cm  ACR TI-RADS Recommendations  TR4 (1-1.4cm) - follow up ultrasound in 1,2,3 and 5 years     2. Two TR3 nodules in the left thyroid gland, measuring up to 1.5 cm  ACR TI-RADS Recommendations  TR3 (1.5 -2.4cm) - follow up ultrasound in 1, 3, and 5 years.        Recommendations based on the [employer] College [employer] Thyroid imaging, reporting and Data System (TI-RADS) 2017.     Chronic Use of Opiate for Therapeutic Purpose    She is on Norco 7.5-325 every 8 hours as needed for her chronic knee and hip pain.  She tried physical therapy before, she does yoga and stretching at home.  Urine drug screen test was collected on Jan 5 2023  PDMP reviewed.  Last fill of Norco 7.5-325 on 11/08/2023 90 tablets, 30-day supply.  No aberrant behavior.  Patient is aware of 3 months appointment policy.   CS and opioid consent on file.     Mild Depression    Chronic, stable on citalopram 40 mg tablet daily.  No SI/HI.   Refills provided today.       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 She is s/p 5040 cGy  IMRT with Dr Crews as of June 2019. used to see Ash Kuo MD.     Dr. Brian Dalal, once a year  S/p radiation.     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      [START ON 12/8/2023] HYDROcodone-acetaminophen (NORCO) 7.5-325 MG tab Take 1 Tablet by mouth every 8 hours as needed for Moderate Pain or Severe Pain for up to 30 days. 90 Tablet 0    citalopram (CELEXA) 40 MG Tab Take 1 Tablet by mouth every day. 90 Tablet 3    losartan (COZAAR) 100 MG Tab Take 1 Tablet by mouth every day. 90 Tablet 3    [START ON 1/8/2024] HYDROcodone-acetaminophen (NORCO) 7.5-325 MG tab Take 1 Tablet by mouth every 8 hours as needed for Moderate Pain or Severe Pain for up to 30 days. 90 Tablet 0    [START ON 2/8/2024] HYDROcodone-acetaminophen (NORCO) 7.5-325 MG tab Take 1 Tablet by mouth every 8 hours as needed for Moderate Pain or Severe Pain for up to 30 days. 90 Tablet 0    potassium chloride ER (KLOR-CON) 10 MEQ tablet Take 2 Tablets by mouth every day. 180 Tablet 3    pravastatin (PRAVACHOL) 20 MG Tab Take 1 Tablet by mouth at bedtime. 90 Tablet 3    furosemide (LASIX) 40 MG Tab Take 1 Tablet by mouth 2 times a day as needed (LE edema). 180 Tablet 3    azelastine (ASTELIN) 137 MCG/SPRAY nasal spray Administer 1 Spray into affected nostril(S) 2  "times a day. 90 mL 1    amLODIPine (NORVASC) 5 MG Tab TAKE ONE TABLET BY MOUTH DAILY 90 Tablet 3    ferrous sulfate 325 (65 Fe) MG tablet Take 1 Tablet by mouth 2 times a day. 180 Tablet 2    Multiple Vitamins-Minerals (PRESERVISION AREDS 2) Cap       ursodiol (ACTIGALL) 300 MG Cap Take 300 mg by mouth 2 times a day.      ascorbic acid (VITAMIN C) 100 MG tablet       vitamin D3, cholecalciferol, 400 UNIT Tab tablet Take 10 mcg by mouth every day.      B Complex-Folic Acid (B COMPLEX-VITAMIN B12 PO) Take 1 Tablet by mouth.       No current Epic-ordered facility-administered medications on file.     Review of Systems   Constitutional:  Negative for chills and fever.   HENT:  Negative for sore throat.    Respiratory:  Positive for shortness of breath.    Cardiovascular:  Negative for chest pain and leg swelling.   Gastrointestinal: Negative.    Musculoskeletal:  Positive for joint pain.   Neurological: Negative.    Psychiatric/Behavioral: Negative.       Objective:     Exam:  /64 (BP Location: Left arm, Patient Position: Sitting, BP Cuff Size: Adult)   Pulse 93   Temp 36.3 °C (97.3 °F) (Temporal)   Ht 1.702 m (5' 7\")   Wt 118 kg (260 lb)   SpO2 94%   BMI 40.72 kg/m²  Body mass index is 40.72 kg/m².    Physical Exam  Constitutional:       Appearance: Normal appearance.   Cardiovascular:      Rate and Rhythm: Normal rate and regular rhythm.      Pulses: Normal pulses.      Heart sounds: Murmur heard.   Pulmonary:      Effort: Pulmonary effort is normal. No respiratory distress.      Breath sounds: Normal breath sounds.   Neurological:      Mental Status: She is alert.      Gait: Gait abnormal (scooter).       Assessment & Plan:   Olive  is a pleasant 83 y.o. female with the following -     Problem List Items Addressed This Visit       Essential hypertension (Chronic)    Relevant Medications    losartan (COZAAR) 100 MG Tab    Mild depression (Chronic)    Relevant Medications    citalopram (CELEXA) 40 MG Tab    " Chronic use of opiate for therapeutic purpose    Relevant Medications    HYDROcodone-acetaminophen (NORCO) 7.5-325 MG tab (Start on 12/8/2023)    HYDROcodone-acetaminophen (NORCO) 7.5-325 MG tab (Start on 1/8/2024)    HYDROcodone-acetaminophen (NORCO) 7.5-325 MG tab (Start on 2/8/2024)    History of carcinoma in situ of cervix uteri    History of malignant carcinoid tumor    Thyroid nodule     Other Visit Diagnoses       Impacted cerumen of both ears        Relevant Orders    Ear Irrigation ([facility] Only)          Return in about 3 months (around 2/27/2024), or if symptoms worsen or fail to improve, for Urine test 3 mo .    Please note that this dictation was created using voice recognition software. I have made every reasonable attempt to correct obvious errors, but I expect that there are errors of grammar and possibly content that I did not discover before finalizing the note.

## 2023-12-07 PROBLEM — Z85.42 HISTORY OF UTERINE CANCER: Status: ACTIVE | Noted: 2023-01-01

## 2023-12-07 PROBLEM — C78.6 PERITONEAL CARCINOMATOSIS (HCC): Status: ACTIVE | Noted: 2023-01-01

## 2023-12-07 PROBLEM — Z71.89 ACP (ADVANCE CARE PLANNING): Status: ACTIVE | Noted: 2021-08-23

## 2023-12-07 PROBLEM — N39.0 UTI (URINARY TRACT INFECTION): Status: ACTIVE | Noted: 2023-01-01

## 2023-12-07 NOTE — ED PROVIDER NOTES
ED Provider Note    CHIEF COMPLAINT  Chief Complaint   Patient presents with    N/V     Onset yesterday at 0900, pt reports last night she was unable to tolerate any PO intake. Pt given 4 mg of SL Zofran en route and reports improvement in symptoms. Pt denies ABD pain.        EXTERNAL RECORDS REVIEWED  Echocardiogram 9/5/2022, evaluated for history of pulmonary hypertension, revealed dilated RV size, estimated RV systolic pressure 68 mmHg  HPI/ROS  LIMITATION TO HISTORY   none  OUTSIDE HISTORIAN(S):   providing clinically relevant collateral history    Olive Alexandra is a 83 y.o. female with past medical history of hyperlipidemia, hypertension, obesity, pulmonary hypertension, uterine cancer in remission presenting to the emergency department for abdominal pain.  Patient says that she has had generalized abdominal discomfort, nausea, vomiting.  Symptoms started yesterday at approximately 9:00 AM.  Had 4 episodes of emesis throughout the night.  No associated chest discomfort, shortness of breath.  Denies any vomiting.  No hematemesis.  No fevers, chills.  No dysuria.      PAST MEDICAL HISTORY   has a past medical history of Acute pulmonary edema (HCC) (9/5/2022), Anemia, COVID-19, Depression, Hyperlipidemia, Hypertension, Interatrial cardiac shunt (09/05/2022), Obesity, Prediabetes, Pulmonary hypertension (HCC) (09/05/2022), and Uterine cancer (HCC).    SURGICAL HISTORY   has a past surgical history that includes open reduction; abdominal hysterectomy total; hernia repair; other; and colon resection.    FAMILY HISTORY  Family History   Problem Relation Age of Onset    Arthritis Mother     No Known Problems Father     No Known Problems Sister     No Known Problems Brother     Arthritis Maternal Aunt     Arthritis Maternal Grandmother     Arthritis Maternal Grandfather     Lung Disease Maternal Grandfather         coal [place]    Heart Disease Paternal Grandmother         MI age 70    No Known Problems  Brother        SOCIAL HISTORY  Social History     Tobacco Use    Smoking status: Never    Smokeless tobacco: Never   Vaping Use    Vaping Use: Never used   Substance and Sexual Activity    Alcohol use: Yes     Comment: once martini every 3-4 month    Drug use: Never    Sexual activity: Not on file       CURRENT MEDICATIONS  Home Medications       Reviewed by Juan Francisco Krueger (Pharmacy Tech) on 12/07/23 at 1349  Med List Status: Complete     Medication Last Dose Status   acetaminophen (TYLENOL) 500 MG Tab 12/5/2023 Active   amLODIPine (NORVASC) 5 MG Tab 12/6/2023 Active   ascorbic acid (VITAMIN C) 1000 MG tablet 12/5/2023 Active   Azelastine (ASTELIN) 137 MCG/SPRAY Solution 12/5/2023 Active   carboxymethylcellulose (REFRESH TEARS) 0.5 % Solution 12/7/2023 Active   citalopram (CELEXA) 40 MG Tab 12/5/2023 Active   ferrous sulfate 325 (65 Fe) MG tablet 12/5/2023 Active   furosemide (LASIX) 40 MG Tab 12/5/2023 Active   HYDROcodone-acetaminophen (NORCO) 7.5-325 MG tab 12/6/2023 Active   HYDROcodone-acetaminophen (NORCO) 7.5-325 MG tab FUTURE FILL Active   HYDROcodone-acetaminophen (NORCO) 7.5-325 MG tab FUTURE FILL Active   losartan (COZAAR) 100 MG Tab 12/5/2023 Active   Multiple Vitamins-Minerals (PRESERVISION AREDS 2) Cap 12/5/2023 Active   omeprazole (PRILOSEC OTC) 20 MG tablet 12/5/2023 Active   Polyethylene Glycol 400 (BLINK TEARS) 0.25 % Solution 12/6/2023 Active   potassium chloride SA (K-DUR) 10 MEQ Tab CR 12/5/2023 Active   pravastatin (PRAVACHOL) 20 MG Tab 12/5/2023 Active   ursodiol (ACTIGALL) 300 MG Cap 12/6/2023 Active   Vitamin D-Vitamin K (VITAMIN K2-VITAMIN D3 PO) 12/5/2023 Active                    ALLERGIES  Allergies   Allergen Reactions    Shellfish Allergy Rash and Vomiting     Sick     Iodine Rash and Vomiting     Has a Shellfish allergy.  Is OK with contrast if given pre-meds first.  Has not tried topical iodine/betadine - doesn't want to chance it      Shrimp (Diagnostic) Vomiting and Nausea      "Vomiting, feeling sick.     Gramineae Pollens Unspecified and Rash     Runny nose      Penicillins Rash     Decades ago as a child         PHYSICAL EXAM  VITAL SIGNS: /81   Pulse 83   Temp 36.4 °C (97.6 °F) (Temporal)   Resp (!) 21   Ht 1.702 m (5' 7\")   Wt 113 kg (250 lb)   SpO2 97%   BMI 39.16 kg/m²    General: Uncomfortable appearing non-toxic, no acute distress  Neuro: oriented x 3, moving all extremities.   HEENT:   - Head: Normocephalic, atraumatic  - Eyes: PERRL  - Ears/Nose: normal external nose and ears  - Mouth: moist mucosal membranes  Resp: clear to auscultation, no increased work of breathing  CV: Regular rate and rhythm  Abd: Soft, distended, generalized tenderness to palpation, no rigidity or guarding.  : No CVA tenderness to percussion.  Extremities: No peripheral edema  Psych: lucid and conversational         DIAGNOSTIC STUDIES / PROCEDURES    EKG  My independent EKG interpretation:  Results for orders placed or performed during the hospital encounter of 23   EKG (NOW)   Result Value Ref Range    Report       [facility] Emergency Dept.    Test Date:  2023  Pt Name:    JENNI NIXON                 Department: ER  MRN:        [id number]                      Room:       [facility]  Gender:     Female                       Technician: [id number]  :        1940                   Requested By:STEPHANIE TANG  Order #:    [id number]                    Reading MD:    Measurements  Intervals                                Axis  Rate:       83                           P:          -27  CT:         251                          QRS:        -6  QRSD:       136                          T:          7  QT:         438  QTc:        515    Interpretive Statements  Sinus rhythm  Prolonged CT interval  Right bundle branch block  Compared to ECG 2022 15:43:45  First degree AV block now present         LABS  Results for orders placed or performed during the hospital " encounter of 12/07/23   CBC WITH DIFFERENTIAL   Result Value Ref Range    WBC 10.3 4.8 - 10.8 K/uL    RBC 3.71 (L) 4.20 - 5.40 M/uL    Hemoglobin 10.6 (L) 12.0 - 16.0 g/dL    Hematocrit 33.1 (L) 37.0 - 47.0 %    MCV 89.2 81.4 - 97.8 fL    MCH 28.6 27.0 - 33.0 pg    MCHC 32.0 (L) 32.2 - 35.5 g/dL    RDW 49.7 35.9 - 50.0 fL    Platelet Count 424 164 - 446 K/uL    MPV 9.9 9.0 - 12.9 fL    Neutrophils-Polys 85.70 (H) 44.00 - 72.00 %    Lymphocytes 4.10 (L) 22.00 - 41.00 %    Monocytes 8.80 0.00 - 13.40 %    Eosinophils 0.20 0.00 - 6.90 %    Basophils 0.40 0.00 - 1.80 %    Immature Granulocytes 0.80 0.00 - 0.90 %    Nucleated RBC 0.00 0.00 - 0.20 /100 WBC    Neutrophils (Absolute) 8.87 (H) 1.82 - 7.42 K/uL    Lymphs (Absolute) 0.42 (L) 1.00 - 4.80 K/uL    Monos (Absolute) 0.91 (H) 0.00 - 0.85 K/uL    Eos (Absolute) 0.02 0.00 - 0.51 K/uL    Baso (Absolute) 0.04 0.00 - 0.12 K/uL    Immature Granulocytes (abs) 0.08 0.00 - 0.11 K/uL    NRBC (Absolute) 0.00 K/uL   COMP METABOLIC PANEL   Result Value Ref Range    Sodium 138 135 - 145 mmol/L    Potassium 4.3 3.6 - 5.5 mmol/L    Chloride 98 96 - 112 mmol/L    Co2 28 20 - 33 mmol/L    Anion Gap 12.0 7.0 - 16.0    Glucose 114 (H) 65 - 99 mg/dL    Bun 17 8 - 22 mg/dL    Creatinine 0.70 0.50 - 1.40 mg/dL    Calcium 10.4 8.5 - 10.5 mg/dL    Correct Calcium 10.8 (H) 8.5 - 10.5 mg/dL    AST(SGOT) 13 12 - 45 U/L    ALT(SGPT) 9 2 - 50 U/L    Alkaline Phosphatase 231 (H) 30 - 99 U/L    Total Bilirubin 0.8 0.1 - 1.5 mg/dL    Albumin 3.5 3.2 - 4.9 g/dL    Total Protein 7.6 6.0 - 8.2 g/dL    Globulin 4.1 (H) 1.9 - 3.5 g/dL    A-G Ratio 0.9 g/dL   LIPASE   Result Value Ref Range    Lipase 21 11 - 82 U/L   TROPONIN   Result Value Ref Range    Troponin T 25 (H) 6 - 19 ng/L   proBrain Natriuretic Peptide, NT   Result Value Ref Range    NT-proBNP 776 (H) 0 - 125 pg/mL   URINALYSIS (UA)    Specimen: Urine   Result Value Ref Range    Color Yellow     Character Cloudy (A)     Specific Gravity  >=1.045 (A) <1.035    Ph 6.0 5.0 - 8.0    Glucose Negative Negative mg/dL    Ketones 15 (A) Negative mg/dL    Protein 30 (A) Negative mg/dL    Bilirubin Negative Negative    Urobilinogen, Urine 1.0 Negative    Nitrite Negative Negative    Leukocyte Esterase Large (A) Negative    Occult Blood Trace (A) Negative    Micro Urine Req Microscopic    ESTIMATED GFR   Result Value Ref Range    GFR (CKD-EPI) 86 >60 mL/min/1.73 m 2   URINE MICROSCOPIC (W/UA)   Result Value Ref Range    WBC 10-20 (A) /hpf    RBC 2-5 (A) /hpf    Bacteria Moderate (A) None /hpf    Epithelial Cells Few /hpf    Trans Epithelial Cells Few /hpf    Mucous Threads Few /hpf    Amorphous Crystal Present /hpf    Hyaline Cast 0-2 /lpf   EKG (NOW)   Result Value Ref Range    Report       [facility] Emergency Dept.    Test Date:  2023  Pt Name:    JENNI NIXON                 Department: ER  MRN:        [id number]                      Room:       [facility]  Gender:     Female                       Technician: [id number]  :        1940                   Requested By:STEPHANIE TANG  Order #:    [id number]                    Reading MD:    Measurements  Intervals                                Axis  Rate:       83                           P:          -27  LA:         251                          QRS:        -6  QRSD:       136                          T:          7  QT:         438  QTc:        515    Interpretive Statements  Sinus rhythm  Prolonged LA interval  Right bundle branch block  Compared to ECG 2022 15:43:45  First degree AV block now present         RADIOLOGY  I have independently interpreted the diagnostic imaging associated with this visit and am waiting the final reading from the radiologist.   My preliminary interpretation is as follows:   - CT abdomen pelvis shows large loculated ascites collection  Radiologist interpretation:   CT-ABDOMEN-PELVIS WITH   Final Result      1.  New moderate to large amount of loculated  ascites with peritoneal enhancement and nodularity consistent with peritoneal carcinomatosis.   2.  No bowel obstruction or acute inflammation although serosal metastasis cannot be excluded.   3.  Moderate to large-sized hiatal hernia is unchanged.   4.  Trace of dependent left pleural effusion and bibasilar atelectasis.              MEDICAL DECISION MAKING    ED Observation Status? Yes; I am placing the patient in to an observation status due to a diagnostic uncertainty as well as therapeutic intensity. Patient placed in observation status at 10:00 AM, 12/7/2023.     Observation plan is as follows: Obtain baseline labs, urinalysis, CT abdomen pelvis, treat patient's nausea and vomiting.    Upon Reevaluation, the patient's condition has: not improved; and will be escalated to hospitalization.    Patient discharged from ED Observation status at 1:00 PM on 12/7/2023    ED COURSE AND PLAN    Olive Alexandra is a 83 y.o. female presenting to the emergency department for is abdominal discomfort, nausea, vomiting.  Symptoms started approximately 24 hours ago.  I obtain baseline labs, EKG, troponin, BNP as well as CT abdomen pelvis.  Patient was feeling dehydrated so she was treated with 1 L IV fluids.  EKG is nonischemic.  Labs reviewed reveal CBC with no leukocytosis, slightly decreased hemoglobin at 10.6 from 12.71-month ago.  Chemistry reveals borderline elevation in alk phos but is stable from priors.  Lipase is normal.  Troponin is elevated at 25 but at her baseline.  BNP is elevated at 776 but decreased from baseline.  CT abdomen pelvis revealing large loculated ascitic fluid collection with peritoneal thickening  concerning for carcinomatosis.  I had a discussion with patient regarding the above findings concerning for possible malignancy.    I discussed CODE STATUS with patient, at this time she would like to remain full code    ---Pertinent ED Course---:    10:23 AM I reviewed the patient's old records in [facility],  medication list, allergies, past medical history and performed a physical examination.   2:00 PM discussed case with hospitalist Dr. Nieves for admission  2:30 PM paracentesis performed, approximately 1.5 L of dark brown/brick colored fluid retrieved      HYDRATION: Based on the patient's presentation of Acute Vomiting and Dehydration the patient was given IV fluids. IV Hydration was used because oral hydration was not adequate alone. Upon recheck following hydration, the patient was stable.      Procedures:    Point of Care Ultrasound    ED POINT OF CARE ULTRASOUND: LIMITED abdomen, in preparation for paracentesis    Indication for exam: Need for paracentesis, evaluation to ensure adequate fluid pocket  Findings: Large ascitic fluid collection with multiple loculations, approximately 10 cm pocket free of intestine    Image retained through [facility] as seen below:      Additional interpretation: 2 cm thickness of anterior abdominal wall, unable to visualize inferior epigastric artery    This study is a limited ultrasound examination performed and interpreted to evaluate for limited conditions as outlined above. There may be other clinically important information contained in the images that is outside this scope. When clinically warranted, a comprehensive ultrasound through the appropriate department is considered.    Paracentesis Procedure Note    Indication: Ascites, loculated ascitic fluid collection, concern for carcinomatosis, diagnostic and therapeutic evaluation for Gram stain, culture, cytology to assess for possible underlying malignancy.    Consent: The patient was counseled regarding the procedure, it's indications, risks, potential complications and alternatives and any questions were answered. Consent was obtained.    Procedure: The patient was placed in the supine position and the appropriate landmarks were identified. The skin over the puncture site in the left lower quadrant region was prepped with  betadine and draped in a sterile fashion and draped in a sterile fashion. Local anesthesia was obtained by infiltration using 1% Lidocaine without epinephrine. A paracentesis catheter was then advanced into the abdominal cavity over a needle and the needle was withdrawn. Fluid was returned which was brick colored.  A total volume of 1200 cc was withdrawn which was sent to the lab for appropriate testing, cell count and differential, total protein, albumin, glucose, gram stain and culture, and cytology. The catheter was then withdrawn and a sterile dressing was placed over the site.     The patient tolerated the procedure well.    Complications: None      ----------------------------------------------------------------------------------  DISCUSSIONS    I have discussed management of the patient with the following physicians and DIMAS's: Admitting hospitalist as described above    Discussion of management with other \[facility]\"" or appropriate source(s): ED pharmacist    Escalation of care considered, and ultimately not performed: Considered but no indication for CT scan of the chest     Barriers to care at this time, including but not limited to: Limited physical capacity    Decision tools and prescription drugs considered including, but not limited to: Antibiotic utilization for urinary tract infection      FINAL IMPRESSION    No diagnosis found.      DISPOSITION        Admission: The patient will be admitted for further evaluation and treatment. Discussed case with consultants and relayed all necessary information.        This chart was dictated using an electronic voice recognition software. The chart has been reviewed and edited but there is still possibility for dictation errors due to limitation of software.    Jamie Guzman,  12/7/2023

## 2023-12-07 NOTE — ED NOTES
Med rec completed per patient with daughter at bedside, and medication list provided by patient (reviewed and returned to bedside).  Allergies reviewed with patient.  Patient's preferred pharmacy: [facility] on [facility]    Outpatient antibiotics within the last 30 days: NONE.    ANTICOAGULATION: NONE.    Chance Garza, PhT

## 2023-12-07 NOTE — ED NOTES
Assumed care of patient for MINE Dominguez. Pt resting in bed. States she still feels nauseated. Attempted little sip of water to see if she can take oral medications- pt's nausea worsened.

## 2023-12-07 NOTE — ASSESSMENT & PLAN NOTE
16 minutes spent discussing goals of care with patient.  She was explained the clinical diagnosis and treatment plan.  When asked about CODE STATUS she states that she would like to be full code and to have everything done, including chest compressions and intubation.

## 2023-12-07 NOTE — ASSESSMENT & PLAN NOTE
Paracentesis done  Follow culture and cytology results  Follow up with Gynecology Oncology as outpatient

## 2023-12-07 NOTE — ED TRIAGE NOTES
"Chief Complaint   Patient presents with    N/V     Onset yesterday at 0900, pt reports last night she was unable to tolerate any PO intake. Pt given 4 mg of SL Zofran en route and reports improvement in symptoms. Pt denies ABD pain.      BP (!) 147/67   Pulse 89   Temp 36.4 °C (97.6 °F) (Temporal)   Resp 20   Ht 1.702 m (5' 7\")   Wt 113 kg (250 lb)   SpO2 90%   BMI 39.16 kg/m²       "

## 2023-12-07 NOTE — H&P
Hospital Medicine History & Physical Note    Date of Service  12/7/2023    Primary Care Physician  Tamar Hoff M.D.    Consultants  None    Code Status  Full Code    Chief Complaint  Chief Complaint   Patient presents with    N/V     Onset yesterday at 0900, pt reports last night she was unable to tolerate any PO intake. Pt given 4 mg of SL Zofran en route and reports improvement in symptoms. Pt denies ABD pain.        History of Presenting Illness  Olive Alexandra is a 83 y.o. female who presented 12/7/2023 with abdominal distention.  She has noted abdominal distention for the last few days, associated with worsening generalized weakness, nausea, several episodes of nonbloody nonbilious vomiting.  She decided to come to ER for evaluation.    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  She has had a hysterectomy and radiation.  She continues to follow with oncology outpatient and has been in remission since then.    In the ER, patient found to have normal vital signs.  Pertinent labs include hemoglobin 10.6, troponin 25, .  UA suggestive of UTI.  CT abdomen showing new moderate to large amount of loculated ascites with peritoneal enhancement and nodularity consistent with peritoneal carcinomatosis.      I discussed the plan of care with patient.    Review of Systems  Review of Systems   Constitutional:  Positive for malaise/fatigue.   HENT: Negative.     Eyes: Negative.    Respiratory: Negative.     Cardiovascular: Negative.    Gastrointestinal:  Positive for abdominal pain, nausea and vomiting.   Genitourinary: Negative.    Musculoskeletal: Negative.    Skin: Negative.    Neurological: Negative.    Endo/Heme/Allergies: Negative.    Psychiatric/Behavioral: Negative.         Past Medical History   has a past medical history of Acute pulmonary edema (HCC) (9/5/2022), Anemia, COVID-19, Depression, Hyperlipidemia, Hypertension, Interatrial cardiac shunt  (09/05/2022), Obesity, Prediabetes, Pulmonary hypertension (HCC) (09/05/2022), and Uterine cancer (HCC).    Surgical History   has a past surgical history that includes open reduction; abdominal hysterectomy total; hernia repair; other; and colon resection.     Family History  family history includes Arthritis in her maternal aunt, maternal grandfather, maternal grandmother, and mother; Heart Disease in her paternal grandmother; Lung Disease in her maternal grandfather; No Known Problems in her brother, brother, father, and sister.   Family history reviewed with patient. There is no family history that is pertinent to the chief complaint.     Social History   reports that she has never smoked. She has never used smokeless tobacco. She reports current alcohol use. She reports that she does not use drugs.    Allergies  Allergies   Allergen Reactions    Shellfish Allergy Rash and Vomiting     Sick     Iodine Vomiting and Rash     Has a Shellfish allergy.  Is OK with contrast if given pre-meds first.  Has not tried topical iodine/betadine - doesn't want to chance it  Has a Shellfish allergy.  Is OK with contrast if given pre-meds first.  Has not tried topical iodine/betadine - doesn't want to chance it      Shrimp (Diagnostic)      Vomiting, feeling sick.     Gramineae Pollens Unspecified and Rash     Runny nose      Penicillins Rash     Decades ago  Decades ago as a child         Medications  Prior to Admission Medications   Prescriptions Last Dose Informant Patient Reported? Taking?   B Complex-Folic Acid (B COMPLEX-VITAMIN B12 PO)   Yes No   Sig: Take 1 Tablet by mouth.   HYDROcodone-acetaminophen (NORCO) 7.5-325 MG tab   No No   Sig: Take 1 Tablet by mouth every 8 hours as needed for Moderate Pain or Severe Pain for up to 30 days.   HYDROcodone-acetaminophen (NORCO) 7.5-325 MG tab   No No   Sig: Take 1 Tablet by mouth every 8 hours as needed for Moderate Pain or Severe Pain for up to 30 days.    HYDROcodone-acetaminophen (NORCO) 7.5-325 MG tab   No No   Sig: Take 1 Tablet by mouth every 8 hours as needed for Moderate Pain or Severe Pain for up to 30 days.   Multiple Vitamins-Minerals (PRESERVISION AREDS 2) Cap   Yes No   amLODIPine (NORVASC) 5 MG Tab   No No   Sig: TAKE ONE TABLET BY MOUTH DAILY   ascorbic acid (VITAMIN C) 100 MG tablet   Yes No   azelastine (ASTELIN) 137 MCG/SPRAY nasal spray   No No   Sig: Administer 1 Spray into affected nostril(S) 2 times a day.   citalopram (CELEXA) 40 MG Tab   No No   Sig: Take 1 Tablet by mouth every day.   ferrous sulfate 325 (65 Fe) MG tablet   No No   Sig: Take 1 Tablet by mouth 2 times a day.   furosemide (LASIX) 40 MG Tab   No No   Sig: Take 1 Tablet by mouth 2 times a day as needed (LE edema).   losartan (COZAAR) 100 MG Tab   No No   Sig: Take 1 Tablet by mouth every day.   omeprazole (PRILOSEC) 20 MG delayed-release capsule   Yes No   Sig: Take 20 mg by mouth every day.   potassium chloride ER (KLOR-CON) 10 MEQ tablet   No No   Sig: Take 2 Tablets by mouth every day.   pravastatin (PRAVACHOL) 20 MG Tab   No No   Sig: Take 1 Tablet by mouth at bedtime.   ursodiol (ACTIGALL) 300 MG Cap   Yes No   Sig: Take 300 mg by mouth 2 times a day.   vitamin D3, cholecalciferol, 400 UNIT Tab tablet   Yes No   Sig: Take 10 mcg by mouth every day.      Facility-Administered Medications: None       Physical Exam  Temp:  [36.4 °C (97.6 °F)] 36.4 °C (97.6 °F)  Pulse:  [76-95] 95  Resp:  [15-20] 15  BP: (129-151)/(61-67) 129/61  SpO2:  [89 %-99 %] 95 %  Blood Pressure : 129/61   Temperature: 36.4 °C (97.6 °F)   Pulse: 95   Respiration: 15   Pulse Oximetry: 95 %       Physical Exam  Constitutional:       Appearance: Normal appearance. She is normal weight.   HENT:      Head: Normocephalic.      Nose: Nose normal.   Eyes:      Pupils: Pupils are equal, round, and reactive to light.   Cardiovascular:      Rate and Rhythm: Normal rate and regular rhythm.      Pulses: Normal pulses.    Pulmonary:      Effort: Pulmonary effort is normal.      Breath sounds: Normal breath sounds.   Abdominal:      General: Abdomen is flat. Bowel sounds are normal.      Palpations: Abdomen is soft.   Musculoskeletal:         General: Swelling present.      Cervical back: Neck supple.   Skin:     General: Skin is warm.   Neurological:      General: No focal deficit present.      Mental Status: She is alert and oriented to person, place, and time. Mental status is at baseline.   Psychiatric:         Mood and Affect: Mood normal.         Behavior: Behavior normal.         Thought Content: Thought content normal.         Judgment: Judgment normal.         Laboratory:  Recent Labs     12/07/23  1021   WBC 10.3   RBC 3.71*   HEMOGLOBIN 10.6*   HEMATOCRIT 33.1*   MCV 89.2   MCH 28.6   MCHC 32.0*   RDW 49.7   PLATELETCT 424   MPV 9.9     Recent Labs     12/07/23  1021   SODIUM 138   POTASSIUM 4.3   CHLORIDE 98   CO2 28   GLUCOSE 114*   BUN 17   CREATININE 0.70   CALCIUM 10.4     Recent Labs     12/07/23  1021   ALTSGPT 9   ASTSGOT 13   ALKPHOSPHAT 231*   TBILIRUBIN 0.8   LIPASE 21   GLUCOSE 114*         Recent Labs     12/07/23  1021   NTPROBNP 776*         Recent Labs     12/07/23  1021   TROPONINT 25*       Imaging:  CT-ABDOMEN-PELVIS WITH   Final Result      1.  New moderate to large amount of loculated ascites with peritoneal enhancement and nodularity consistent with peritoneal carcinomatosis.   2.  No bowel obstruction or acute inflammation although serosal metastasis cannot be excluded.   3.  Moderate to large-sized hiatal hernia is unchanged.   4.  Trace of dependent left pleural effusion and bibasilar atelectasis.          X-Ray:  I have personally reviewed the images and compared with prior images.    Assessment/Plan:  Justification for Admission Status  I anticipate this patient will require at least two midnights for appropriate medical management, necessitating inpatient admission because patient has suspected  peritoneal carcinomatosis    Patient will need a Telemetry bed on EMERGENCY service .  The need is secondary to peritoneal carcinomatosis.    * Peritoneal carcinomatosis (HCC)  Assessment & Plan  Spoke to ERP.  Patient presents for abdominal pain nausea and vomiting.    CT abdomen demonstrates New moderate to large amount of loculated ascites with peritoneal enhancement and nodularity consistent with peritoneal carcinomatosis.   Ultrasound-guided paracentesis ordered for diagnostic and therapeutic purposes  Zofran      History of uterine cancer  Assessment & Plan  History of uterine cancer that spread to appendix  Status post hysterectomy/radiation  Has been following outpatient and having surveillance with PET scans to which she has been told she has been in remission    UTI (urinary tract infection)  Assessment & Plan  Rocephin    Hypertension- (present on admission)  Assessment & Plan  Restart prn    Hyperlipidemia- (present on admission)  Assessment & Plan  Continue pravachol    ACP (advance care planning)- (present on admission)  Assessment & Plan  16 minutes spent discussing goals of care with patient.  She was explained the clinical diagnosis and treatment plan.  When asked about CODE STATUS she states that she would like to be full code and to have everything done, including chest compressions and intubation.        VTE prophylaxis: enoxaparin ppx

## 2023-12-08 PROBLEM — E83.42 HYPOMAGNESEMIA: Status: ACTIVE | Noted: 2023-01-01

## 2023-12-08 PROBLEM — K44.9 HIATAL HERNIA: Status: ACTIVE | Noted: 2023-01-01

## 2023-12-08 PROBLEM — J96.11 CHRONIC RESPIRATORY FAILURE WITH HYPOXIA (HCC): Status: ACTIVE | Noted: 2022-09-04

## 2023-12-08 PROBLEM — R82.81 PYURIA: Status: ACTIVE | Noted: 2023-01-01

## 2023-12-08 PROBLEM — R94.31 PROLONGED QT INTERVAL: Status: ACTIVE | Noted: 2023-01-01

## 2023-12-08 PROBLEM — R11.2 INTRACTABLE NAUSEA AND VOMITING: Status: ACTIVE | Noted: 2023-01-01

## 2023-12-08 PROBLEM — R18.8 ASCITES: Status: ACTIVE | Noted: 2023-01-01

## 2023-12-08 PROBLEM — K21.9 GERD (GASTROESOPHAGEAL REFLUX DISEASE): Status: ACTIVE | Noted: 2023-01-01

## 2023-12-08 NOTE — DIETARY
"Nutrition services: Day 1 of admit.  Olive Alexandra is a 83 y.o. female with admitting DX of Peritoneal carcinomatosis (HCC) [C78.6]     Consult received for MST 2: unplanned wt loss 14-23 lb x >1 yr, no poor PO intake PTA. RD visited pt at bedside; visitor present. Pt states she had intentional wt loss over the past year due to reducing portion sizes. Pt denied negative outcomes associated w/ wt loss.    Assessment:  Height: 170.2 cm (5' 7\")  Weight: 113 kg (250 lb)  Body mass index is 39.16 kg/m²., BMI classification: Obesity class II  Diet/Intake: Full liquid; no PO documented yet    Evaluation:   PMHx includes: anemia, COVID-19, depression, HLD, HTN, pre-DM, uterine cancer. Dx list includes peritoneal carcinomatosis, UTI  Labs: glu 163, AST 8, alk phos 186  MAR: valium, lasix, protonix, ursodiol  Skin: no wounds or edema documented  Wt hx per chart review shows 9.1% wt loss in 15 months which is not significant. Question accuracy of wt on 11/27 as it appears copied forward from 08/31/23:  12/07/23 113 kg (250 lb)  11/27/23 118 kg (260 lb)  08/31/23 118 kg (260 lb)  09/22/22 125 kg (275 lb)    Malnutrition Risk: No noted criteria; intentional wt loss d/t diet changed per pt.    Recommendations/Plan:  Encourage intake of meals >75%.  Document intake of all PO as % taken in ADL's to provide interdisciplinary communication across all shifts.   Monitor weight.  Nutrition rep will continue to see patient for ongoing meal and snack preferences.     RD will screen weekly per department policy; available PRN.          "

## 2023-12-08 NOTE — PROGRESS NOTES
Hospital Medicine Daily Progress Note    Date of Service  12/8/2023    Chief Complaint  Olive Alexandra is a 83 y.o. female admitted 12/7/2023 with nausea and vomiting    Hospital Course  Admitted with intractable nausea vomiting, CT scan of the abdomen pelvis showed large amount of ascites, and peritoneal carcinomatosis.  Since disease was done at the ED.    Interval Problem Update  Ascites - cultures negative so far  Peritoneal carcinomatosis - cytology pending  Nausea and vomiting - persistent  Hypertension - sbp 118-170  Pyuria - culture pending  Low magnesium      Updates given and plan of care discussed with patient's family who are at bedside.  Lengthy discussion, multiple questions answered.    I have discussed this patient's plan of care and discharge plan at IDT rounds today with Case Management, Nursing, Nursing leadership, and other members of the IDT team.    Consultants/Specialty  Gynecology oncology    Code Status  Full Code    Disposition  The patient is not medically cleared for discharge to home or a post-acute facility.  Anticipate discharge to: home with close outpatient follow-up    I have placed the appropriate orders for post-discharge needs.    Review of Systems  Review of Systems   Constitutional:  Positive for malaise/fatigue. Negative for chills, diaphoresis and fever.   HENT:  Negative for congestion, hearing loss and sore throat.    Eyes:  Negative for blurred vision.   Respiratory:  Negative for cough, shortness of breath and wheezing.    Cardiovascular:  Positive for leg swelling. Negative for chest pain and palpitations.   Gastrointestinal:  Positive for abdominal pain, heartburn, nausea and vomiting. Negative for blood in stool and melena.   Genitourinary:  Negative for dysuria, flank pain and hematuria.   Musculoskeletal:  Negative for back pain, joint pain, myalgias and neck pain.   Skin:  Negative for rash.   Neurological:  Positive for weakness and headaches. Negative for  dizziness, sensory change, speech change and focal weakness.   Psychiatric/Behavioral:  The patient is nervous/anxious.         Physical Exam  Temp:  [36.3 °C (97.3 °F)-37.7 °C (99.9 °F)] 36.3 °C (97.3 °F)  Pulse:  [83-98] 98  Resp:  [15-21] 18  BP: (118-170)/(56-81) 140/56  SpO2:  [95 %-98 %] 96 %    Physical Exam  Vitals and nursing note reviewed.   Constitutional:       Appearance: She is obese.   HENT:      Head: Normocephalic and atraumatic.      Nose: No congestion.      Mouth/Throat:      Mouth: Mucous membranes are dry.   Eyes:      Extraocular Movements: Extraocular movements intact.      Conjunctiva/sclera: Conjunctivae normal.   Cardiovascular:      Rate and Rhythm: Normal rate and regular rhythm.      Heart sounds: Murmur heard.   Pulmonary:      Effort: Pulmonary effort is normal.      Breath sounds: Normal breath sounds.   Abdominal:      General: There is distension.      Tenderness: There is abdominal tenderness. There is no guarding or rebound.   Musculoskeletal:      Cervical back: No tenderness.      Right lower leg: Edema present.      Left lower leg: Edema present.   Skin:     General: Skin is warm and dry.   Neurological:      General: No focal deficit present.      Mental Status: She is alert and oriented to person, place, and time.      Cranial Nerves: No cranial nerve deficit.   Psychiatric:         Mood and Affect: Mood is anxious.         Fluids    Intake/Output Summary (Last 24 hours) at 12/8/2023 1253  Last data filed at 12/8/2023 0700  Gross per 24 hour   Intake --   Output 5 ml   Net -5 ml       Laboratory  Recent Labs     12/07/23  1021 12/08/23  0848   WBC 10.3 9.3   RBC 3.71* 3.55*   HEMOGLOBIN 10.6* 10.2*   HEMATOCRIT 33.1* 32.0*   MCV 89.2 90.1   MCH 28.6 28.7   MCHC 32.0* 31.9*   RDW 49.7 50.3*   PLATELETCT 424 491*   MPV 9.9 9.5     Recent Labs     12/07/23  1021 12/08/23  0848   SODIUM 138 141   POTASSIUM 4.3 3.6   CHLORIDE 98 98   CO2 28 26   GLUCOSE 114* 163*   BUN 17 16    CREATININE 0.70 0.80   CALCIUM 10.4 9.6     Recent Labs     12/08/23  0848   INR 1.22*               Imaging  CT-ABDOMEN-PELVIS WITH   Final Result      1.  New moderate to large amount of loculated ascites with peritoneal enhancement and nodularity consistent with peritoneal carcinomatosis.   2.  No bowel obstruction or acute inflammation although serosal metastasis cannot be excluded.   3.  Moderate to large-sized hiatal hernia is unchanged.   4.  Trace of dependent left pleural effusion and bibasilar atelectasis.           Assessment/Plan  * Peritoneal carcinomatosis (HCC)- (present on admission)  Assessment & Plan  Paracentesis done  Follow culture and cytology results  Consult Gynecology Oncology  Check CEA. AFP, CA 19-9,       Intractable nausea and vomiting- (present on admission)  Assessment & Plan  Trial of Phenergan, Scopolamine, Diazepam, Benadryl  Trial of Full liquids    Ascites- (present on admission)  Assessment & Plan  Paracentesis done  Follow culture and cytology results    Primary biliary cholangitis (HCC)- (present on admission)  Assessment & Plan  Ursodiol    Prolonged QT interval- (present on admission)  Assessment & Plan  Follow EKG    GERD (gastroesophageal reflux disease)- (present on admission)  Assessment & Plan  Start IV Protonix    Hiatal hernia- (present on admission)  Assessment & Plan  Aspiration precautions    History of uterine cancer- (present on admission)  Assessment & Plan  Consult Gynecology oncology    Pyuria- (present on admission)  Assessment & Plan  IV Rocephin  Follow cultures    Pulmonary hypertension (HCC)- (present on admission)  Assessment & Plan  Start IV lasix  Monitor volume status    Chronic respiratory failure with hypoxia (HCC)- (present on admission)  Assessment & Plan  RT protocol    Hypertension- (present on admission)  Assessment & Plan  Losartan, Norvasc  Start IV labetalol and hydralazine as needed with parameters    Hypomagnesemia- (present on  admission)  Assessment & Plan  IV Mg 2 g  Follow level    Morbid obesity (HCC)- (present on admission)  Assessment & Plan  Body mass index is 39.16 kg/m².     Hyperlipidemia- (present on admission)  Assessment & Plan  Hold Pravastatin         VTE prophylaxis:   SCDs/TEDs      I have performed a physical exam and reviewed and updated ROS and Plan today (12/8/2023). In review of yesterday's note (12/7/2023), there are no changes except as documented above.

## 2023-12-08 NOTE — PROGRESS NOTES
Patient arrived to [place] [id number] from ED. Patient arrived with her daughter and requested a private room. Advised we would assist with that request as one becomes available. Patient required three people to transfer from stretcher to bed. Bed alarm and waffle mattress in use. Pur wick will be utilized.

## 2023-12-08 NOTE — THERAPY
Physical Therapy Contact Note    Patient Name: Olive Alexandra  Age:  83 y.o., Sex:  female  Medical Record #: [id number]  Today's Date: 12/8/2023    PT consult received, per last admission, 9/2022 pt utilized slide board transfer to power w/c at [facility] with 4 hours of caregiving a day; had not walked in 9 years at that point; will monitor for acute PT needs/round with pt when able;     Zuleika ABERNATHY, PT,  [phone]

## 2023-12-08 NOTE — PROGRESS NOTES
4 Eyes Skin Assessment Completed by David RN and MINE Araiza.    Head WDL  Ears WDL  Nose WDL  Mouth WDL  Neck WDL  Breast/Chest WDL  Shoulder Blades WDL  Spine WDL  (R) Arm/Elbow/Hand WDL  (L) Arm/Elbow/Hand WDL  Abdomen WDL  Groin WDL  Scrotum/Coccyx/Buttocks WDL  (R) Leg WDL  (L) Leg WDL  (R) Heel/Foot/Toe WDL  (L) Heel/Foot/Toe WDL          Devices In Places Nasal Cannula      Interventions In Place NC W/Ear Foams, Waffle Overlay, Pillows, Q2 Turns, and Pressure Redistribution Mattress    Possible Skin Injury No    Pictures Uploaded Into Epic N/A  Wound Consult Placed N/A  RN Wound Prevention Protocol Ordered No

## 2023-12-08 NOTE — CARE PLAN
Problem: Fall Risk  Goal: Patient will remain free from falls  Description: Target End Date:  Prior to discharge or change in level of care    Document interventions on the [facility] Fall Risk Assessment    1.  Assess for fall risk factors  2.  Implement fall precautions  Outcome: Progressing   The patient is Watcher - Medium risk of patient condition declining or worsening    Shift Goals  Clinical Goals: Reduce episodes of nausea  Patient Goals: Stop vomiting    Progress made toward(s) clinical / shift goals:  Pt is W/C bound at baseline.  Turns self in bed and calls for assistance    Patient is not progressing towards the following goals:

## 2023-12-09 PROBLEM — E83.39 HYPOPHOSPHATEMIA: Status: ACTIVE | Noted: 2023-01-01

## 2023-12-09 PROBLEM — R73.9 HYPERGLYCEMIA: Status: ACTIVE | Noted: 2023-01-01

## 2023-12-09 NOTE — PROGRESS NOTES
Assumed care at 0700. Report received by Martina BLOUNT  PT a & o x4  Pt denies nausea since vomiting this a.m after morning pills from [facility]. Prolonged Qtc. Pt can have Benedryl, Valium, Phenergan. Pt has Scopalamine patch on.   Pt receiving Kphos IV piggyback.   Pt tolerating fulls but poor po intake. Adv to soft when tolerating.   Pt w/c bound.   Pt incontinent of urine. Purewick. Pt on waffle cushion.   IV abx  Plan for today  Manage nausea/vomiting encourage PO intake  Echo pending.

## 2023-12-09 NOTE — CONSULTS
Gynecologic Oncology Consultation    Date: 2023    Requesting Physician: Dr. Rust     Consulting Physician: Dr. Brian Dalal, SHRAVAN Forbes     Reason for consultation: History of uterine cancer     CC: nausea and vomiting     HPI:     Mrs. Alexandra is a pleasant 83 year old  white female whose LMP was in her early 50's. She has a past medical history significant for endometrial cancer, thyroid nodules, abdominal hernia, HTN, hyperlipidemia, anemia (iron deficiency), elevated BMI, hepatitis A, PBC, GERD, fatty liver, arthritis with secondary chronic knee pain and shoulder pain, right rotator cuff injury, and inability to walk. She has an extensive past surgical history including robotic hysterectomy, BSO, RP mass excision, appendectomy, cytoreduction, enterolysis, and omental resection on 19, as well as umbilical hernia repair, tonsillectomy, and surgical fixation for a broken right ankle.     She initially presented with c/o PMB.  She was diagnosed and treated for her endometrial cancer in [place], [place]. 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 She is s/p 5040  cGy IMRT with Dr. Crews in 2019, and was followed for surveillance every six months by Dr. Ash Kuo. She did not  believe that she had a  measured before or during her treatment  She has not history of HRT. She moved to [place] and was referred to Dr. Dalal for further monitoring.     She has been monitored with our office with , CT, and     - on 2/3/23 was 17.6    -CT CAP @ [facility] on 22: There is a 5mm Pulmonary nodule identified anteriorly in the right upper lobe. Differential diagnosis does include primary or metastatic neoplasm as well as inflammation. No other CT evidence of metastatic disease in chest or abdomen or  pelvis.    - 8/3/22 was 205    -CT  CAP @ [facility] on 8/8/22 with results showing LIO    - on 1/5/23 was 214    -PET CT on 3/31/23 showed no evidence for recurrent tumor in the pelvis. No evidence for metastatic  disease. Probable inflammatory changes in the distal esophagus. Moderate loculated ascites. Small left pleural effusion with  associated atelectasis.    She was last seen in office on 6/5/23. Although her PET showed LIO, Dr. Dalal discussed the possibility of cancer recurrence and that treatment options would include surgical intervention or chemotherapy, but that  and that treatment would be difficult due to her age. Patient opted to continue with monitoring and a plan was made to repeat a PET in 12/2023.     She presented to the ER on 12/7/23 with c/o nausea and vomiting with abdominal pain. CT abdomen showed new moderate to large amount of loculated ascites with peritoneal enhancement and nodularity consistent with peritoneal carcinomatosis. Approximally 1.2 L of dark brown fluid was removed via paracentesis on 12/7. Cardiac workup in the ER was unremarkable. Given these findings, she was admitted to the hospitalist service for management of her n/v, pain, and for treatment of UTI and Gynecology Oncology was consulted.     Patient was seen and examined at bedside this afternoon. She states that she was in her usual state of health until Tuesday night. She has been at a party and ate some food that she thinks she soul not have; subsequently vomited that night. Wednesday morning she drank a bit, but did not have the urge to eat or drink at all the rest of the day. Thursday morning she woke with severe nausea and vomiting. She was not able to keep any food or water down. She has associated epigastric pain secondary to coughing with emesis. She also reported worsening of her chronic GERD symptoms. She then presented to the ER for her symptoms. She has been vomiting until this afternoon when a  scopolamine patch seemed to take effect. Her abdominal pain has also now resolved since she has not vomited.  She felt that her abdomen had been a bit firmer a few days ago, but did not notice this prior. She has noticed that her abdominal hernia was a bit larger over sevral months and that she was burping more for the past 6 or so months. Also endorses mildly reduced appetite over 6 months, but also reports that she had been making an effort to loose weight by eating less and has lost about one dress size in 6 months. Had one loose BM in the hospital today but otherwise has been having 1-2 normal BM daily. She denies urinary symptoms, blood in urine, or stools, or back tarry stools. She denies vaginal bleeding. She was on home oxgen after florinda COVID-19 but has been weaning off of that to 0.5-1L at night only.     Review of Systems   Constitutional:  Positive for weight loss. Negative for chills, fever and malaise/fatigue.   HENT:  Negative for congestion, sinus pain and sore throat.    Eyes:  Negative for blurred vision.   Respiratory:  Negative for cough, shortness of breath and wheezing.    Cardiovascular:  Positive for leg swelling. Negative for chest pain.   Gastrointestinal:  Positive for abdominal pain, heartburn, nausea and vomiting. Negative for blood in stool, constipation and melena.   Genitourinary:  Negative for dysuria, flank pain, frequency, hematuria and urgency.   Musculoskeletal:  Positive for joint pain.   Neurological:  Negative for dizziness, sensory change, speech change, focal weakness and headaches.   Psychiatric/Behavioral:  The patient is not nervous/anxious.         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 shoulder arthroscopy 2017    OTHER      right shoulder arthroscopy 2017       OB/GYN History: ,  x 2  LMP in her early 50s menarche age 11   Last pap 23 was WNL. No history of abnormal  Not sexually active/BCM  History fibroids    Current Facility-Administered Medications   Medication Dose Route Frequency Provider Last Rate Last Admin    pantoprazole (Protonix) injection 40 mg  40 mg Intravenous BID Pipo Fleming M.D.   40 mg at 23 1303    promethazine (Phenergan) tablet 25 mg  25 mg Oral Q6HRS PRN Pipo Fleming M.D.        diazePAM (Valium) injection 2.5 mg  2.5 mg Intravenous Q6HRS PRN Pipo Fleming M.D.        scopolamine (Transderm-Scop) patch 1 Patch  1 Patch Transdermal Q72HRS Pipo Fleming M.D.   1 Patch at 23 1254    diphenhydrAMINE (Benadryl) injection 25 mg  25 mg Intravenous Q6HRS PRN Pipo Fleming M.D.        labetalol (Normodyne/Trandate) injection 10 mg  10 mg Intravenous Q6HRS PRN Pipo Fleming M.D.        hydrALAZINE (Apresoline) injection 10 mg  10 mg Intravenous Q6HRS PRN Pipo Fleming M.D.        [START ON 2023] furosemide (Lasix) injection 20 mg  20 mg Intravenous Q DAY Pipo Fleming M.D.        amLODIPine (Norvasc) tablet 5 mg  5 mg Oral DAILY Gerhard Nieves M.D.        citalopram (CeleXA) tablet 40 mg  40 mg Oral DAILY Gerhard Nieves M.D.        [START ON 2024] HYDROcodone-acetaminophen (Norco) 5-325 MG per tablet 1 Tablet  1 Tablet Oral Q8HRS PRN Gerhard Nieves M.D.        [Held by provider] pravastatin (Pravachol) tablet 20 mg  20 mg Oral QHS Gerhard Nieves M.D.   20 mg at  12/07/23 2128    ursodiol (Actigall) capsule 300 mg  300 mg Oral BID WITH MEALS Gerhard Nieves M.D.        losartan (Cozaar) tablet 100 mg  100 mg Oral DAILY Gerhard Nieves M.D.   100 mg at 12/07/23 2128    [Held by provider] enoxaparin (Lovenox) inj 40 mg  40 mg Subcutaneous DAILY AT 1800 Gerhard Nieves M.D.   40 mg at 12/07/23 1849    cefTRIAXone (Rocephin) syringe 1,000 mg  1,000 mg Intravenous Q24HR Gerhard Nieves M.D.           Allergies:  Shellfish allergy, Iodine, Shrimp (diagnostic), Gramineae pollens, and Penicillins    Social History     Socioeconomic History    Marital status:      Spouse name: Not on file    Number of children: Not on file    Years of education: Not on file    Highest education level: Bachelor's degree (e.g., BA, AB, BS)   Occupational History    Not on file   Tobacco Use    Smoking status: Never    Smokeless tobacco: Never   Vaping Use    Vaping Use: Never used   Substance and Sexual Activity    Alcohol use: Yes     Comment: once martini every 3-4 month    Drug use: Never    Sexual activity: Not on file   Other Topics Concern    Not on file   Social History Narrative    Not on file     Social Determinants of Health     Financial Resource Strain: Low Risk  (5/23/2022)    Overall Financial Resource Strain (CARDIA)     Difficulty of Paying Living Expenses: Not hard at all   Food Insecurity: No Food Insecurity (5/23/2022)    Hunger Vital Sign     Worried About Running Out of Food in the Last Year: Never true     Ran Out of Food in the Last Year: Never true   Transportation Needs: No Transportation Needs (5/23/2022)    PRAPARE - Transportation     Lack of Transportation (Medical): No     Lack of Transportation (Non-Medical): No   Physical Activity: Sufficiently Active (5/23/2022)    Exercise Vital Sign     Days of Exercise per Week: 5 days     Minutes of Exercise per Session: 50 min   Stress: Not on file   Social Connections: Socially Integrated (5/23/2022)    Social  "Connection and Isolation Panel [NHANES]     Frequency of Communication with Friends and Family: More than three times a week     Frequency of Social Gatherings with Friends and Family: More than three times a week     Attends [employer] Services: More than 4 times per year     Active Member of Clubs or Organizations: Yes     Attends Club or Organization Meetings: More than 4 times per year     Marital Status:    Intimate Partner Violence: Not on file   Housing Stability: Low Risk  (5/23/2022)    Housing Stability Vital Sign     Unable to Pay for Housing in the Last Year: No     Number of Places Lived in the Last Year: 2     Unstable Housing in the Last Year: No       Family History   Problem Relation Age of Onset    Arthritis Mother     No Known Problems Father     No Known Problems Sister     No Known Problems Brother     Arthritis Maternal Aunt     Arthritis Maternal Grandmother     Arthritis Maternal Grandfather     Lung Disease Maternal Grandfather         coal mine    Heart Disease Paternal Grandmother         MI age 70    No Known Problems Brother          Physical Exam:  /71   Pulse 97   Temp 37.2 °C (99 °F) (Temporal)   Resp 18   Ht 1.702 m (5' 7\")   Wt 113 kg (250 lb)   SpO2 95%       Physical Exam  Constitutional:       General: She is not in acute distress.  HENT:      Head: Normocephalic.      Mouth/Throat:      Mouth: Mucous membranes are dry.   Eyes:      General:         Right eye: No discharge.         Left eye: No discharge.   Cardiovascular:      Rate and Rhythm: Normal rate.   Pulmonary:      Effort: Pulmonary effort is normal. No respiratory distress.   Abdominal:      Tenderness: There is no abdominal tenderness.      Comments: Rounded abdomen. Large midline abdominal hernia with 4x5 cm area of nodularity.    Musculoskeletal:      Right lower leg: No edema.      Left lower leg: No edema.   Lymphadenopathy:      Cervical: No cervical adenopathy.   Skin:     General: Skin is warm " and dry.      Capillary Refill: Capillary refill takes 2 to 3 seconds.   Neurological:      General: No focal deficit present.      Mental Status: She is alert and oriented to person, place, and time.      Cranial Nerves: No cranial nerve deficit.   Psychiatric:         Mood and Affect: Mood normal.          Labs:  Recent Labs     12/07/23  1021 12/08/23  0848   WBC 10.3 9.3   RBC 3.71* 3.55*   HEMOGLOBIN 10.6* 10.2*   HEMATOCRIT 33.1* 32.0*   MCV 89.2 90.1   MCH 28.6 28.7   MCHC 32.0* 31.9*   RDW 49.7 50.3*   PLATELETCT 424 491*   MPV 9.9 9.5     Recent Labs     12/07/23  1021 12/08/23  0848   SODIUM 138 141   POTASSIUM 4.3 3.6   CHLORIDE 98 98   CO2 28 26   GLUCOSE 114* 163*   BUN 17 16   CREATININE 0.70 0.80   CALCIUM 10.4 9.6     Recent Labs     12/08/23  0848   INR 1.22*     Recent Labs     12/07/23  1021 12/08/23  0848   ASTSGOT 13 8*   ALTSGPT 9 7   TBILIRUBIN 0.8 0.6   ALKPHOSPHAT 231* 186*   GLOBULIN 4.1* 3.4   INR  --  1.22*       Radiology:   CT AP 12/7/23:   1.  New moderate to large amount of loculated ascites with peritoneal enhancement and nodularity consistent with peritoneal carcinomatosis.  2.  No bowel obstruction or acute inflammation although serosal metastasis cannot be excluded.  3.  Moderate to large-sized hiatal hernia is unchanged.  4.  Trace of dependent left pleural effusion and bibasilar atelectasis.    Assessment/Plan: This is a 83 y.o. female with history of endometrial cancer who presents with acute onset of n/v and abdominal pain.      History of IIIB endometrial adenocarcinoma. 4 years and 6 months since last treatment. Has had elevated  since 8/2022. Serial imaging has showed LIO, however, most recent CT findings concerning for cancer recurrence. Most recent  107, CA 19-9 973, and CEA 8.2. Paracentesis fluid sent for cytology; results pending. Will need treatment planning discussion with Dr. Dalal.   Peritoneal Carcinomatosis: suggested on CT and concerning for disease  recurrence.   Nausea and vomiting: secondary to above. + BM. No evidence of bowel obstruction. Currently controlled with scopolamine. Tolerating FLD.  ADAT.   UTI- Rocephin per primary team.   HTN- management per primary team.   HLD- management per primary team.     Case discussed with Dr. Dalal. If patient's nausea and vomiting are controlled, and she is able to tolerate solid food she may discharge when cleared by primary team. Dr. Dalal will plan to see her outpatient next week for treatment planning. We will sign off but be available if needed.

## 2023-12-09 NOTE — CARE PLAN
Problem: Knowledge Deficit - Standard  Goal: Patient and family/care givers will demonstrate understanding of plan of care, disease process/condition, diagnostic tests and medications  Outcome: Progressing     Problem: Fall Risk  Goal: Patient will remain free from falls  Outcome: Progressing     Problem: Gastrointestinal Irritability  Goal: Nausea and vomiting will be absent or improve  Outcome: Progressing   The patient is Stable - Low risk of patient condition declining or worsening    Shift Goals  Clinical Goals: manage/ nausea vomiting; echo  Patient Goals: bath  Family Goals: none present    Progress made toward(s) clinical / shift goals:  high fall risk. Bed in lowest and locked position. Upper side rails up. Yellow fall risk band on. Pt calls appropriately and doesn't attempt to get out of bed without staff assistance. Bed and chair alarm on.     Pt had mild nausea today. Hasn't required anti-emetics. Pt advance to full liquid. Poor appetite. Plan to possible dc home tomorrow pending nausea.      Patient is not progressing towards the following goals:

## 2023-12-09 NOTE — DISCHARGE PLANNING
Care Transition Team Assessment    RNCM met with patient and daughter Jen at bedside to discuss discharge planning. Face sheet information verified,RNCM informed patient and her daughter that case management was consulted for possible placement. Patient stated she was all set and does not need placement. Patient states she lives at an independent living facility ([facility]) here in [place] with her . They live on the 4th floor but has elevator access. Patient uses a scooter at home, 0.5L oxygen at bedtime, and is established with [facility]. She informed this CM that prior to this admission she was independent with her ADLS/IADLS, however; she does have a care-giver that helps bedtime routine. [facility] Pharmacy is [facility] at [facility].       Information Source  Orientation Level: Oriented X4  Information Given By: Patient  Informant's Name: Olive  Who is responsible for making decisions for patient? : Patient    Readmission Evaluation  Is this a readmission?: No    Elopement Risk  Legal Hold: No  Ambulatory or Self Mobile in Wheelchair: No-Not an Elopement Risk  Elopement Risk: Not at Risk for Elopement    Interdisciplinary Discharge Planning  Does Admitting Nurse Feel This Could be a Complex Discharge?: No  Primary Care Physician: Ruth Boyle MD  Lives with - Patient's Self Care Capacity: Spouse  Patient or legal guardian wants to designate a caregiver: No  Support Systems: Family Member(s), Spouse / Significant Other  Housing / Facility: Independent Living Facility, Other (Comments) (4th floor with elevator access.)  Do You Take your Prescribed Medications Regularly: Yes  Able to Return to Previous ADL's: Yes  Prior Services: Other (Comments) (Home with spouse; has care-giver from [facility])  Patient Prefers to be Discharged to:: Home  Assistance Needed: No  Durable Medical Equipment: Home Oxygen  DME Provider / Phone: Samantha    Discharge  Preparedness  What is your plan after discharge?: Home with help  What are your discharge supports?: Spouse, Other (comment) (Adult daughter)  Prior Functional Level: Independent with Activities of Daily Living, Other (Comments) (uses scooter)  Difficulity with ADLs: None  Difficulity with IADLs: None    Functional Assesment  Prior Functional Level: Independent with Activities of Daily Living, Other (Comments) (uses scooter)    Finances  Financial Barriers to Discharge: No  Prescription Coverage: Yes    Vision / Hearing Impairment  Vision Impairment : Yes  Right Eye Vision: Impaired, Wears Glasses  Left Eye Vision: Impaired, Wears Glasses  Hearing Impairment : No         Advance Directive  Advance Directive?: None    Domestic Abuse  Have you ever been the victim of abuse or violence?: No  Physical Abuse or Sexual Abuse: No  Verbal Abuse or Emotional Abuse: No  Possible Abuse/Neglect Reported to:: Not Applicable    Psychological Assessment  History of Substance Abuse: None  History of Psychiatric Problems: No    Discharge Risks or Barriers  Discharge risks or barriers?: No  Patient risk factors: Other (comment) (none)    Anticipated Discharge Information  Discharge Disposition: Discharged to home/self care (01)

## 2023-12-09 NOTE — PROGRESS NOTES
Hospital Medicine Daily Progress Note    Date of Service  12/9/2023    Chief Complaint  Olive Alexandra is a 83 y.o. female admitted 12/7/2023 with nausea and vomiting    Hospital Course  Admitted with intractable nausea and vomiting, CT scan of the abdomen and pelvis showed large amount of ascites, and peritoneal carcinomatosis.  Paracentesis was done at the ED. Gynecology oncology was consulted on the case    Interval Problem Update  Ascites - cultures negative so far  Peritoneal carcinomatosis - cytology pending, Gynecology oncology recommendations noted  Nausea and vomiting - tolerating full liquids, however had an episode of vomiting after taking her morning pills  Hypertension - sbp 112-154  Pyuria - culture pending  Pulm HTN - probnp 3400  Low phosphorus      Updates given and plan of care discussed with patient's family who were at bedside.  Lengthy discussion, multiple questions answered.    I have discussed this patient's plan of care and discharge plan at IDT rounds today with Case Management, Nursing, Nursing leadership, and other members of the IDT team.    Consultants/Specialty  Gynecology oncology    Code Status  Full Code    Disposition  The patient is not medically cleared for discharge to home or a post-acute facility.  Anticipate discharge to: home with close outpatient follow-up    I have placed the appropriate orders for post-discharge needs.    Review of Systems  Review of Systems   Constitutional:  Positive for malaise/fatigue. Negative for chills, diaphoresis and fever.   HENT:  Negative for congestion, hearing loss and sore throat.    Eyes:  Negative for blurred vision.   Respiratory:  Negative for cough, shortness of breath and wheezing.    Cardiovascular:  Positive for leg swelling. Negative for chest pain and palpitations.   Gastrointestinal:  Positive for abdominal pain, heartburn, nausea and vomiting. Negative for blood in stool and melena.   Genitourinary:  Negative for dysuria,  flank pain and hematuria.   Musculoskeletal:  Negative for back pain, joint pain, myalgias and neck pain.   Skin:  Negative for rash.   Neurological:  Positive for weakness. Negative for dizziness, sensory change, speech change, focal weakness and headaches.   Psychiatric/Behavioral:  The patient is nervous/anxious.         Physical Exam  Temp:  [36.8 °C (98.2 °F)-37.2 °C (99 °F)] 36.8 °C (98.2 °F)  Pulse:  [75-98] 75  Resp:  [17-18] 17  BP: (112-154)/() 139/106  SpO2:  [95 %-97 %] 96 %    Physical Exam  Vitals and nursing note reviewed.   Constitutional:       Appearance: She is obese.   HENT:      Head: Normocephalic and atraumatic.      Nose: No congestion.      Mouth/Throat:      Mouth: Mucous membranes are moist.   Eyes:      Extraocular Movements: Extraocular movements intact.      Conjunctiva/sclera: Conjunctivae normal.   Cardiovascular:      Rate and Rhythm: Normal rate and regular rhythm.      Heart sounds: Murmur heard.   Pulmonary:      Effort: Pulmonary effort is normal.      Breath sounds: Normal breath sounds.   Abdominal:      General: There is distension.      Tenderness: There is abdominal tenderness. There is no guarding or rebound.   Musculoskeletal:      Cervical back: No tenderness.      Right lower leg: Edema present.      Left lower leg: Edema present.   Skin:     General: Skin is warm and dry.   Neurological:      General: No focal deficit present.      Mental Status: She is alert and oriented to person, place, and time.      Cranial Nerves: No cranial nerve deficit.   Psychiatric:         Mood and Affect: Mood is anxious.         Fluids    Intake/Output Summary (Last 24 hours) at 12/9/2023 1245  Last data filed at 12/9/2023 0454  Gross per 24 hour   Intake 350 ml   Output 720 ml   Net -370 ml         Laboratory  Recent Labs     12/07/23  1021 12/08/23  0848 12/09/23  0131   WBC 10.3 9.3 9.5   RBC 3.71* 3.55* 3.50*   HEMOGLOBIN 10.6* 10.2* 10.1*   HEMATOCRIT 33.1* 32.0* 31.3*   MCV  89.2 90.1 89.4   MCH 28.6 28.7 28.9   MCHC 32.0* 31.9* 32.3   RDW 49.7 50.3* 49.9   PLATELETCT 424 491* 459*   MPV 9.9 9.5 9.9       Recent Labs     12/07/23  1021 12/08/23  0848 12/09/23  0131   SODIUM 138 141 141   POTASSIUM 4.3 3.6 3.7   CHLORIDE 98 98 99   CO2 28 26 28   GLUCOSE 114* 163* 138*   BUN 17 16 16   CREATININE 0.70 0.80 0.67   CALCIUM 10.4 9.6 9.8       Recent Labs     12/08/23  0848   INR 1.22*                 Imaging  CT-ABDOMEN-PELVIS WITH   Final Result      1.  New moderate to large amount of loculated ascites with peritoneal enhancement and nodularity consistent with peritoneal carcinomatosis.   2.  No bowel obstruction or acute inflammation although serosal metastasis cannot be excluded.   3.  Moderate to large-sized hiatal hernia is unchanged.   4.  Trace of dependent left pleural effusion and bibasilar atelectasis.      EC-ECHOCARDIOGRAM COMPLETE W/O CONT    (Results Pending)        Assessment/Plan  * Peritoneal carcinomatosis (HCC)- (present on admission)  Assessment & Plan  Paracentesis done  Follow culture and cytology results  Follow up with Gynecology Oncology as outpatient        Intractable nausea and vomiting- (present on admission)  Assessment & Plan  Phenergan, Scopolamine, Diazepam, Benadryl  Full liquids, ADAT GI soft, low fat    Ascites- (present on admission)  Assessment & Plan  Paracentesis done  Follow culture and cytology results    Primary biliary cholangitis (HCC)- (present on admission)  Assessment & Plan  Ursodiol    Hyperglycemia- (present on admission)  Assessment & Plan  Hgba1c pending    Hypomagnesemia- (present on admission)  Assessment & Plan  IV Mg given  Follow level    Prolonged QT interval- (present on admission)  Assessment & Plan  Follow EKG    GERD (gastroesophageal reflux disease)- (present on admission)  Assessment & Plan  IV Protonix    Hiatal hernia- (present on admission)  Assessment & Plan  Aspiration precautions    History of uterine cancer- (present  on admission)  Assessment & Plan  Follow up with Gynecology oncology as outpatient    Pyuria- (present on admission)  Assessment & Plan  IV Rocephin  Follow cultures    Pulmonary hypertension (HCC)- (present on admission)  Assessment & Plan  increase IV lasix  Monitor volume status  Check Echocardiogram    Chronic respiratory failure with hypoxia (HCC)- (present on admission)  Assessment & Plan  RT protocol    Hypertension- (present on admission)  Assessment & Plan  Losartan, Norvasc  IV labetalol and hydralazine as needed with parameters    Morbid obesity (HCC)- (present on admission)  Assessment & Plan  Body mass index is 39.16 kg/m².     Hyperlipidemia- (present on admission)  Assessment & Plan  Hold Pravastatin         VTE prophylaxis:   SCDs/TEDs      I have performed a physical exam and reviewed and updated ROS and Plan today (12/9/2023). In review of yesterday's note (12/8/2023), there are no changes except as documented above.

## 2023-12-09 NOTE — CARE PLAN
The patient is Watcher - Medium risk of patient condition declining or worsening    Shift Goals  Clinical Goals: nausea/vomitting control, hydration  Patient Goals: no emesis  Family Goals: MELY    Progress made toward(s) clinical / shift goals:  Patient states n/v improved, however had x1 emesis shortly after. Vallium given with patient stating it has been effective in controlling nausea. Patient able to sleep afterwards.     Problem: Knowledge Deficit - Standard  Goal: Patient and family/care givers will demonstrate understanding of plan of care, disease process/condition, diagnostic tests and medications  Outcome: Progressing     Problem: Skin Integrity  Goal: Skin integrity is maintained or improved  Outcome: Progressing     Problem: Fall Risk  Goal: Patient will remain free from falls  Outcome: Progressing

## 2023-12-09 NOTE — CARE PLAN
The patient is Stable - Low risk of patient condition declining or worsening    Shift Goals  Clinical Goals: Reduce episodes of Nausea and vomitting throughout shift  Patient Goals: Stop vomitting  Family Goals: Communication, nausea control    Progress made toward(s) clinical / shift goals:  Started off the shift with pt having recurrent emesis every 10-30 mins. Nothing was helping control the vomiting. Provider then ordered a scopolamine patch and that seemed to finally help. Pt states she has not been sleeping due to the nausea and finally got a few naps in due to the relief. It did make her a little forgetful and altered even though she remained Aox4. Morning meds were even refused due to the nausea. All meds following the changes were given and tolerated well. Daughter was very involved in care. Consult with her normal oncologist's NP occurred and oncologist will visit tomorrow. PT remained incontinent due to refusing to get up due to pain, despite having good strength in all 4 extremities upon assessment. She is worried that he fluids were stopped even though she has had no intake.     Problem: Knowledge Deficit - Standard  Goal: Patient and family/care givers will demonstrate understanding of plan of care, disease process/condition, diagnostic tests and medications  Outcome: Progressing     Problem: Skin Integrity  Goal: Skin integrity is maintained or improved  Outcome: Progressing     Problem: Fall Risk  Goal: Patient will remain free from falls  Outcome: Progressing

## 2023-12-10 NOTE — CARE PLAN
Problem: Knowledge Deficit - Standard  Goal: Patient and family/care givers will demonstrate understanding of plan of care, disease process/condition, diagnostic tests and medications  Outcome: Progressing     Problem: Skin Integrity  Goal: Skin integrity is maintained or improved  Outcome: Progressing     Problem: Fall Risk  Goal: Patient will remain free from falls  Outcome: Progressing     Problem: Communication  Goal: The ability to communicate needs accurately and effectively will improve  Outcome: Progressing     Problem: Gastrointestinal Irritability  Goal: Nausea and vomiting will be absent or improve  Outcome: Progressing  Goal: Diarrhea will be absent or improved  Outcome: Progressing   The patient is Stable - Low risk of patient condition declining or worsening    Shift Goals  Clinical Goals: Nausea/vomitting and echo  Patient Goals: Rest  Family Goals: none present    Progress made toward(s) clinical / shift goals:  pt was able to maintain SATs in the mid-90s when up with PT.    Patient is not progressing towards the following goals:

## 2023-12-10 NOTE — THERAPY
"Physical Therapy   Initial Evaluation     Patient Name: Olive Alexandra  Age:  83 y.o., Sex:  female  Medical Record #: [id number]  Today's Date: 12/10/2023     Precautions  Precautions: Fall Risk    Assessment   Olive Alexandra is a 83 y.o. female admitted 12/7/2023 with nausea and vomiting, CT scan of the abdomen and pelvis showed large amount of ascites, and peritoneal carcinomatosis.  PMH includes a history of uterine cancer with mets to the appendix.  Additionally, due to arthritis, she has been power wheelchair bound, and assisted with bed mobility, some ADL, and transfers via slide board at baseline.     She has supportive spouse and daughter, as well as caregiver assist 7x/week morning and evening hours for needed help.  She uses supplemental O2 at night.  She has had home health in the past, states she \"will think about it\" and says there is a PT that operates in the [facility] where she [facility].  I encouraged her to think about home health therapies to help with decreased activity tolerance and endurance that results from a hospital stay - even sitting up in her chair may be harder after a hospital stay.  She will continue to benefit from assist per family and caregiver upon DC to help prevent immobility injuries and all mobility.  Acute PT is not indicated at this time, at baseline for mobility needs.    Plan    Physical Therapy Initial Treatment Plan   Duration: Discharge Needs Only    DC Equipment Recommendations: None (Owns needed DME)  Discharge Recommendations: Recommend home health for continued physical therapy services       Subjective    Pt is agreeable to particiapte, daughter and spouse present(at beginning) and supportive, \"we were just talking about how it would be nice to sit at the edge of the bed\"     Objective       12/10/23 1106    Services   Is patient using  services for this encounter? No   Initial Contact Note    Initial Contact Note Order Received and Verified, " Physical Therapy Evaluation in Progress with Full Report to Follow.   Precautions   Precautions Fall Risk   Vitals   Vitals Comments Spo2 on room air during therapy evaluation ranged from 87%-92% - pt uses O2 at night   Pain 0 - 10 Group   Therapist Pain Assessment Post Activity Pain Same as Prior to Activity;Nurse Notified;0   Prior Living Situation   Prior Services Intermittent Physical Support for ADL Per Service;Intermittent Physical Support for ADL Per Family   Housing / Facility Independent Living Facility   Steps Into Home 0   Steps In Home 0   Elevator Yes   Bathroom Set up Walk In Shower   Equipment Owned Power Wheel Chair;Slide Board;Tub / Shower Seat;Hand Held Shower;Oxygen   Lives with - Patient's Self Care Capacity Spouse   Comments Supportive daughter lives nearby   Prior Level of Functional Mobility   Bed Mobility Required Assist   Transfer Status Required Assist   Ambulation   (DOes not ambulate at baseline)   Assistive Devices Used Power Wheel Chair   Wheelchair Independent   History of Falls   History of Falls No   Cognition    Level of Consciousness Alert   Comments Awake, pleasant, able to make needs known   Passive ROM Lower Body   Passive ROM Lower Body WDL   Active ROM Lower Body    Active ROM Lower Body  WDL   Comments Adequate ROM for supine/sitting   Strength Lower Body   Lower Body Strength  X   Comments Pt is generally 2+/5 in major myotomes   Other Treatments   Other Treatments Provided Coordinated with nurse staff for positioning, linen adjustment, pure wick and room air trials.  While sitting edge of bed, pt particiapte in conversation (maintains SpO2 on room air 87-92% and seated grooming tasks.   Balance Assessment   Sitting Balance (Static) Good  (Once positioned edge of bed with feet on floor and hips equal)   Sitting Balance (Dynamic) Fair -   Standing Balance (Static) Dependent   Standing Balance (Dynamic) Dependent   Weight Shift Sitting Poor   Comments Weakness limits scooting  effort, unable to reposition self in sitting   Bed Mobility    Supine to Sit Maximal Assist   Sit to Supine Maximal Assist   Scooting Maximal Assist   Rolling Maximal Assist to Rt.;Maximum Assist to Lt.   Gait Analysis   Comments Pt has not walked in greater than 5 years   Functional Mobility   Comments Familiar mobility tools are not present in room (ie scooter and slide board) patietn wanted to sit edge of bed only, declined offer to assist to a chair/wheelchair   How much difficulty does the patient currently have...   Turning over in bed (including adjusting bedclothes, sheets and blankets)? 1   Sitting down on and standing up from a chair with arms (e.g., wheelchair, bedside commode, etc.) 1   Moving from lying on back to sitting on the side of the bed? 1   How much help from another person does the patient currently need...   Moving to and from a bed to a chair (including a wheelchair)? 1   Need to walk in a hospital room? 1   Climbing 3-5 steps with a railing? 1   6 clicks Mobility Score 6   Activity Tolerance   Sitting Edge of Bed 20 minutes   Education Group   Education Provided Role of Physical Therapist   Role of Physical Therapist Patient Response Patient;Acceptance;Explanation;Verbal Demonstration   Physical Therapy Initial Treatment Plan    Duration Discharge Needs Only   Anticipated Discharge Equipment and Recommendations   DC Equipment Recommendations None  (Owns needed DME)   Discharge Recommendations Recommend home health for continued physical therapy services   Interdisciplinary Plan of Care Collaboration   IDT Collaboration with  Nursing   Patient Position at End of Therapy In Bed;Call Light within Reach;Tray Table within Reach;Phone within Reach;Family / Friend in Room   Collaboration Comments RN awar eof session   Session Information   Date / Session Number  12/10 - PT eval complete, DAVIN PT

## 2023-12-10 NOTE — PROGRESS NOTES
Assumed care of patient. AO x4. With complaints of pain 5/5. Routine assessment done. Vital signs within normal limits. Call light within reach and personal belongings within reach. Bed locked and in lowest position. Policies and procedures reinforced patient verbalized understanding.Treaded socks in place. Care plan ongoing.

## 2023-12-10 NOTE — PROGRESS NOTES
Pt became nauseated around 1545. Benadryl IV given with little relief. Pt refused valium till bedtime. 1755 pt received phenergan oral. Pt sleeping. Pt had no emesis for day shift.

## 2023-12-10 NOTE — PROGRESS NOTES
Hospital Medicine Daily Progress Note    Date of Service  12/10/2023    Chief Complaint  Olive Alexandra is a 83 y.o. female admitted 12/7/2023 with nausea and vomiting    Hospital Course  Admitted with intractable nausea and vomiting, CT scan of the abdomen and pelvis showed large amount of ascites, and peritoneal carcinomatosis.  Paracentesis was done at the ED. Gynecology oncology was consulted on the case    Interval Problem Update  Ascites - cultures negative so far  Peritoneal carcinomatosis - cytology pending  Nausea and vomiting - tolerating full liquids  Hypertension - sbp 119-159  Pyuria - culture pending  Pulm HTN - probnp 1400      Updates given and plan of care discussed with patient's family who were at bedside.     I have discussed this patient's plan of care and discharge plan at IDT rounds today with Case Management, Nursing, Nursing leadership, and other members of the IDT team.    Consultants/Specialty  Gynecology oncology    Code Status  Full Code    Disposition  The patient is not medically cleared for discharge to home or a post-acute facility.  Anticipate discharge to: home with close outpatient follow-up    I have placed the appropriate orders for post-discharge needs.    Review of Systems  Review of Systems   Constitutional:  Positive for malaise/fatigue. Negative for chills, diaphoresis and fever.   HENT:  Negative for congestion, hearing loss and sore throat.    Eyes:  Negative for blurred vision.   Respiratory:  Negative for cough, shortness of breath and wheezing.    Cardiovascular:  Positive for leg swelling. Negative for chest pain and palpitations.   Gastrointestinal:  Positive for abdominal pain and heartburn. Negative for blood in stool, melena, nausea and vomiting.   Genitourinary:  Negative for dysuria, flank pain and hematuria.   Musculoskeletal:  Negative for back pain, joint pain, myalgias and neck pain.   Skin:  Negative for rash.   Neurological:  Positive for weakness.  Negative for dizziness, sensory change, speech change, focal weakness and headaches.   Psychiatric/Behavioral:  The patient is nervous/anxious.         Physical Exam  Temp:  [36.6 °C (97.8 °F)-37.1 °C (98.8 °F)] 36.7 °C (98 °F)  Pulse:  [72-78] 78  Resp:  [17-18] 17  BP: (119-159)/(51-71) 132/51  SpO2:  [94 %-98 %] 97 %    Physical Exam  Vitals and nursing note reviewed.   Constitutional:       Appearance: She is obese.   HENT:      Head: Normocephalic and atraumatic.      Nose: No congestion.      Mouth/Throat:      Mouth: Mucous membranes are moist.   Eyes:      Extraocular Movements: Extraocular movements intact.      Conjunctiva/sclera: Conjunctivae normal.   Cardiovascular:      Rate and Rhythm: Normal rate and regular rhythm.      Heart sounds: Murmur heard.   Pulmonary:      Effort: Pulmonary effort is normal.      Breath sounds: Normal breath sounds.   Abdominal:      General: There is distension.      Tenderness: There is abdominal tenderness. There is no guarding or rebound.   Musculoskeletal:      Cervical back: No tenderness.      Right lower leg: Edema present.      Left lower leg: Edema present.   Skin:     General: Skin is warm and dry.   Neurological:      General: No focal deficit present.      Mental Status: She is alert and oriented to person, place, and time.      Cranial Nerves: No cranial nerve deficit.   Psychiatric:         Mood and Affect: Mood is anxious.         Fluids  No intake or output data in the 24 hours ending 12/10/23 1431      Laboratory  Recent Labs     12/08/23  0848 12/09/23  0131 12/10/23  0625   WBC 9.3 9.5 8.0   RBC 3.55* 3.50* 3.34*   HEMOGLOBIN 10.2* 10.1* 9.7*   HEMATOCRIT 32.0* 31.3* 30.7*   MCV 90.1 89.4 91.9   MCH 28.7 28.9 29.0   MCHC 31.9* 32.3 31.6*   RDW 50.3* 49.9 51.2*   PLATELETCT 491* 459* 378   MPV 9.5 9.9 9.6       Recent Labs     12/08/23  0848 12/09/23  0131 12/10/23  0310   SODIUM 141 141 140   POTASSIUM 3.6 3.7 3.8   CHLORIDE 98 99 97   CO2 26 28 31    GLUCOSE 163* 138* 106*   BUN 16 16 14   CREATININE 0.80 0.67 0.57   CALCIUM 9.6 9.8 9.3       Recent Labs     12/08/23  0848   INR 1.22*                 Imaging  IR-US GUIDED PIV   Final Result    Ultrasound-guided PERIPHERAL IV INSERTION performed by    qualified nursing staff as above.      CT-ABDOMEN-PELVIS WITH   Final Result      1.  New moderate to large amount of loculated ascites with peritoneal enhancement and nodularity consistent with peritoneal carcinomatosis.   2.  No bowel obstruction or acute inflammation although serosal metastasis cannot be excluded.   3.  Moderate to large-sized hiatal hernia is unchanged.   4.  Trace of dependent left pleural effusion and bibasilar atelectasis.      EC-ECHOCARDIOGRAM COMPLETE W/O CONT    (Results Pending)        Assessment/Plan  * Peritoneal carcinomatosis (HCC)- (present on admission)  Assessment & Plan  Paracentesis done  Follow culture and cytology results  Follow up with Gynecology Oncology as outpatient        Intractable nausea and vomiting- (present on admission)  Assessment & Plan  Phenergan, Scopolamine, Diazepam, Benadryl  Full liquids  ADAT - Low fat, GI soft    Ascites- (present on admission)  Assessment & Plan  Paracentesis done  Follow culture and cytology results    Primary biliary cholangitis (HCC)- (present on admission)  Assessment & Plan  Ursodiol    Hypophosphatemia- (present on admission)  Assessment & Plan  IV Kphos given  Follow level    Hyperglycemia- (present on admission)  Assessment & Plan  Hgba1c 5.3    Hypomagnesemia- (present on admission)  Assessment & Plan  IV Mg given  Follow level    Prolonged QT interval- (present on admission)  Assessment & Plan  Follow EKG    GERD (gastroesophageal reflux disease)- (present on admission)  Assessment & Plan  IV Protonix    Hiatal hernia- (present on admission)  Assessment & Plan  Aspiration precautions    History of uterine cancer- (present on admission)  Assessment & Plan  Follow up with  Gynecology oncology as outpatient    Pyuria- (present on admission)  Assessment & Plan  IV Rocephin  Follow cultures    Pulmonary hypertension (HCC)- (present on admission)  Assessment & Plan  IV lasix  Monitor volume status  Check Echocardiogram    Chronic respiratory failure with hypoxia (HCC)- (present on admission)  Assessment & Plan  RT protocol    Hypertension- (present on admission)  Assessment & Plan  Losartan, Norvasc  IV labetalol and hydralazine as needed with parameters    Morbid obesity (HCC)- (present on admission)  Assessment & Plan  Body mass index is 39.16 kg/m².     Hyperlipidemia- (present on admission)  Assessment & Plan  Hold Pravastatin         VTE prophylaxis:   SCDs/TEDs      I have performed a physical exam and reviewed and updated ROS and Plan today (12/10/2023). In review of yesterday's note (12/9/2023), there are no changes except as documented above.

## 2023-12-10 NOTE — CARE PLAN
The patient is Stable - Low risk of patient condition declining or worsening    Shift Goals  Clinical Goals: Nausea/vomitting and echo  Patient Goals: Rest  Family Goals: none present    Progress made toward(s) clinical / shift goals:    Problem: Knowledge Deficit - Standard  Goal: Patient and family/care givers will demonstrate understanding of plan of care, disease process/condition, diagnostic tests and medications  Outcome: Progressing     Problem: Skin Integrity  Goal: Skin integrity is maintained or improved  Outcome: Progressing     Problem: Gastrointestinal Irritability  Goal: Nausea and vomiting will be absent or improve  Outcome: Progressing  Goal: Diarrhea will be absent or improved  Outcome: Progressing       Patient is not progressing towards the following goals:

## 2023-12-10 NOTE — CARE PLAN
Problem: Knowledge Deficit - Standard  Goal: Patient and family/care givers will demonstrate understanding of plan of care, disease process/condition, diagnostic tests and medications  12/10/2023 1256 by Nilay Cartagena R.N.  Outcome: Progressing  12/10/2023 1251 by AMAN LevinN.  Outcome: Progressing     Problem: Skin Integrity  Goal: Skin integrity is maintained or improved  12/10/2023 1256 by AMAN LevinN.  Outcome: Progressing  12/10/2023 1251 by AMAN LevinN.  Outcome: Progressing     Problem: Fall Risk  Goal: Patient will remain free from falls  12/10/2023 1256 by AMAN LevinN.  Outcome: Progressing  12/10/2023 1251 by AMAN LevinN.  Outcome: Progressing     Problem: Communication  Goal: The ability to communicate needs accurately and effectively will improve  12/10/2023 1256 by Nilay Cartagena R.N.  Outcome: Progressing  12/10/2023 1251 by AMAN LevinN.  Outcome: Progressing     Problem: Gastrointestinal Irritability  Goal: Nausea and vomiting will be absent or improve  12/10/2023 1256 by AMAN LevinN.  Outcome: Progressing  12/10/2023 1251 by Nilay Cartagena RNuraN.  Outcome: Progressing  Goal: Diarrhea will be absent or improved  12/10/2023 1256 by AMAN LevinN.  Outcome: Progressing  12/10/2023 1251 by AMAN LevinN.  Outcome: Progressing   The patient is Stable - Low risk of patient condition declining or worsening    Shift Goals  Clinical Goals: Nausea/vomitting and echo  Patient Goals: Rest  Family Goals: none present    Progress made toward(s) clinical / shift goals:  Pt has reported no pain thus far.       Patient is not progressing towards the following goals:

## 2023-12-11 NOTE — TELEPHONE ENCOUNTER
Called patient regarding current admission.  Left voicemail.  Advised to follow-up in clinic after the discharge.

## 2023-12-11 NOTE — DISCHARGE SUMMARY
Discharge Summary    CHIEF COMPLAINT ON ADMISSION  Chief Complaint   Patient presents with    N/V     Onset yesterday at 0900, pt reports last night she was unable to tolerate any PO intake. Pt given 4 mg of SL Zofran en route and reports improvement in symptoms. Pt denies ABD pain.        Reason for Admission  EMS     Admission Date  12/7/2023    CODE STATUS  Full Code    HPI & HOSPITAL COURSE  This is a 83 y.o. female here with   intractable nausea and vomiting, CT scan of the abdomen and pelvis showed large amount of ascites, and peritoneal carcinomatosis.  Paracentesis was done at the ED. Gynecology oncology was consulted on the case.  Symptoms of nausea and vomiting were managed.  Electrolyte deficiencies were replaced.  She was able to advance from clear liquids to full liquids with mild nausea, but no vomiting.  Fluid culture and Gram stain were noted to be negative.  Cytology was still pending.  Gynecology oncology recommended outpatient follow-up.  Plan of care and discharge plan was discussed with the patient's spouse who was at bedside.      Therefore, she is discharged in fair and stable condition to home with close outpatient follow-up.    The patient met 2-midnight criteria for an inpatient stay at the time of discharge.    Discharge Date  12/11/2023    FOLLOW UP ITEMS POST DISCHARGE  Follow up as below    DISCHARGE DIAGNOSES  Principal Problem:    Peritoneal carcinomatosis (HCC) (POA: Yes)  Active Problems:    Primary biliary cholangitis (HCC) (Chronic) (POA: Yes)      Overview: Chronic, stable, on ursodiol.  Follows with GI.     Ascites (POA: Yes)    Intractable nausea and vomiting (POA: Yes)    Hypertension (POA: Yes)      Overview: Currently on amlodipine 5 mg and losartan 100 mg daily and furosemide 40       mg twice daily as needed home BP Runs in 120s over 60s at home.          Chronic respiratory failure with hypoxia (HCC) (POA: Yes)      Overview: This is an ongoing problem, tested positive  for COVID-19 9/4/2022 was       discharged home with supplemental   Currently on 0.5 L/min as needed.        Saturating 94%.     Pulmonary hypertension (HCC) (Chronic) (POA: Yes)      Overview: Echo RVSP estimated at 68 mmHg    Pyuria (POA: Yes)    History of uterine cancer (POA: Yes)    Hiatal hernia (POA: Yes)    GERD (gastroesophageal reflux disease) (POA: Yes)    Prolonged QT interval (POA: Yes)    Hypomagnesemia (POA: Yes)    Hyperglycemia (POA: Yes)    Hypophosphatemia (POA: Yes)    Hyperlipidemia (POA: Yes)      Overview: On pravastatin    Morbid obesity (HCC) (Chronic) (POA: Yes)      Overview: Body mass index is 43.07 kg/m².      Healthful lifestyle measures        Resolved Problems:    * No resolved hospital problems. *      FOLLOW UP  Future Appointments   Date Time Provider Department Center   12/14/2023  8:00 AM [facility] [address]   12/22/2023  7:30 AM [address] [facility]     Tamar Hoff M.D.  [address]  [address]  [place] [address]  [phone]    Follow up      Brian Dalal M.D.  [address] [facility]   [address]  [place] [address]  [phone]    Follow up        MEDICATIONS ON DISCHARGE     Medication List        START taking these medications        Instructions   diphenhydrAMINE 25 MG capsule  Commonly known as: Benadryl   Take 1 Capsule by mouth every 6 hours as needed (nausea).  Dose: 25 mg     scopolamine 1 mg/72hr Pt72  Commonly known as: Transderm-Scop   Place 1 Patch on the skin every 72 hours.  Dose: 1 Patch            CONTINUE taking these medications        Instructions   acetaminophen 500 MG Tabs  Commonly known as: Tylenol   Take 500 mg by mouth 1 time a day as needed for Mild Pain.  Dose: 500 mg     amLODIPine 5 MG Tabs  Commonly known as: Norvasc   Take 5 mg by mouth every day.  Dose: 5 mg     ascorbic acid 1000 MG tablet  Commonly known as: Vitamin C   Take 1,000 mg by mouth every day.  Dose: 1,000 mg     Azelastine 137 MCG/SPRAY  Soln  Commonly known as: Astelin   Administer 1 Spray into each nostril every day.  Dose: 1 Spray     Blink Tears 0.25 % Soln  Generic drug: Polyethylene Glycol 400   Administer 1 Drop into both eyes every evening.  Dose: 1 Drop     citalopram 40 MG Tabs  Commonly known as: CeleXA   Take 1 Tablet by mouth every day.  Dose: 40 mg     ferrous sulfate 325 (65 Fe) MG tablet   Take 325 mg by mouth 2 times a day with meals.  Dose: 325 mg     furosemide 40 MG Tabs  Commonly known as: Lasix   Take 1 Tablet by mouth 2 times a day as needed (LE edema).  Dose: 40 mg     * HYDROcodone-acetaminophen 7.5-325 MG tab  Commonly known as: Norco   Doctor's comments: From 12/08/2023 to 1/7/2024.  May fill 1-2 days earlier. NOT A DUPLICATE! Controlled substance 90 days supply, month #1/3, please fill according to the dates above. Do not send refill requests.  Take 1 Tablet by mouth every 8 hours as needed for Moderate Pain or Severe Pain for up to 30 days.  Dose: 1 Tablet     * HYDROcodone-acetaminophen 7.5-325 MG tab  Start taking on: January 8, 2024  Commonly known as: Norco   Doctor's comments: From 1/8/2024 to 2/7/2024. May fill 1-2 days earlier. NOT A DUPLICATE! Controlled substance 90 days supply, month #2/3, please fill according to the dates above. Do not send refill requests.  Take 1 Tablet by mouth every 8 hours as needed for Moderate Pain or Severe Pain for up to 30 days.  Dose: 1 Tablet     * HYDROcodone-acetaminophen 7.5-325 MG tab  Start taking on: February 8, 2024  Commonly known as: Norco   Doctor's comments: From 2/08/2024 to 3/9/2024. May fill 1-2 days earlier. NOT A DUPLICATE! Controlled substance 90 days supply, month #3/3, please fill according to the dates above. Do not send refill requests.  Take 1 Tablet by mouth every 8 hours as needed for Moderate Pain or Severe Pain for up to 30 days.  Dose: 1 Tablet     losartan 100 MG Tabs  Commonly known as: Cozaar   Take 1 Tablet by mouth every day.  Dose: 100 mg      potassium chloride SA 10 MEQ Tbcr  Commonly known as: K-Dur   Take 10 mEq by mouth every evening.  Dose: 10 mEq     pravastatin 20 MG Tabs  Commonly known as: Pravachol   Take 1 Tablet by mouth at bedtime.  Dose: 20 mg     PreserVision AREDS 2 Caps   Take 1 Capsule by mouth 2 times a day.  Dose: 1 Capsule     PriLOSEC OTC 20 MG tablet  Generic drug: omeprazole   Take 20 mg by mouth every day.  Dose: 20 mg     Refresh Tears 0.5 % Soln  Generic drug: carboxymethylcellulose   Administer 3-4 Drops into both eyes every morning.  Dose: 3-4 Drop     ursodiol 300 MG Caps  Commonly known as: Actigall   Take 300 mg by mouth 2 times a day with meals.  Dose: 300 mg     VITAMIN K2-VITAMIN D3 PO   Take 1 Tablet by mouth every afternoon on Monday through Friday. 1 tablet = 5,000 units vitamin D3  Dose: 1 Tablet           * This list has 3 medication(s) that are the same as other medications prescribed for you. Read the directions carefully, and ask your doctor or other care provider to review them with you.                  Allergies  Allergies   Allergen Reactions    Shellfish Allergy Rash and Vomiting     Sick     Iodine Rash and Vomiting     Has a Shellfish allergy.  Is OK with contrast if given pre-meds first.  Has not tried topical iodine/betadine - doesn't want to chance it      Shrimp (Diagnostic) Vomiting and Nausea     Vomiting, feeling sick.     Gramineae Pollens Unspecified and Rash     Runny nose      Penicillins Rash     Decades ago as a child         DIET  Orders Placed This Encounter   Procedures    Diet Order Diet: Full Liquid     Standing Status:   Standing     Number of Occurrences:   1     Order Specific Question:   Diet:     Answer:   Full Liquid [11]       ACTIVITY  As tolerated.  Weight bearing as tolerated    CONSULTATIONS  Gynecology Oncology - Dalal    PROCEDURES  Paracentesis     LABORATORY  Lab Results   Component Value Date    SODIUM 136 12/11/2023    POTASSIUM 3.5 (L) 12/11/2023    CHLORIDE 95 (L)  12/11/2023    CO2 33 12/11/2023    GLUCOSE 107 (H) 12/11/2023    BUN 11 12/11/2023    CREATININE 0.53 12/11/2023        Lab Results   Component Value Date    WBC 7.8 12/11/2023    HEMOGLOBIN 9.5 (L) 12/11/2023    HEMATOCRIT 31.2 (L) 12/11/2023    PLATELETCT 386 12/11/2023        Total time of the discharge process exceeds 40 minutes.

## 2023-12-11 NOTE — PROGRESS NOTES
Alert and able to let her needs known, states she tolerated all her meals yesterday without nausea and would consume around 50% of each meal. Did have some nausea this evening with turning and repositioning in bed; medicated as requested. She does not want to advance her clear liquid diet order

## 2023-12-12 NOTE — PROGRESS NOTES
Transitional Care Management  TCM Outreach Date and Time: Filed (12/12/2023  8:49 AM)    Discharge Questions  Actual Discharge Date: 12/11/23  Now that you are home, how are you feeling?: Good (feeling much better, is able to eat, scopalamine patch helping with nausea, has fv with Dr. Dalal 12/14, will call to make fv with pcp after)  Did you receive any new prescriptions?: Yes  Were you able to get them filled?: Yes  Meds to Bed or Pharmacy filled?: Pharmacy  Do you have any questions about your current medications or new medications (Review Med Rec)?: No  Did you have any durable medical equipment ordered?: No  Do you have a follow up appointment scheduled with your PCP?: No  Was an appointment scheduled for the patient?: No  Any issues or paperwork you wish to discuss with your PCP?: No  Are you (patient) able to get to the appointment?: Yes  Reason not scheduled?: Declines  If Home Health was ordered, have they contacted you (Patient): Not Applicable  Did you have enough support after your last discharge?: Yes  Does this patient qualify for the CCM program?: Yes    Transitional Care  Number of attempts made to contact patient: 1  Current or previous attempts competed within two business days of discharge? : Yes  Provided education regarding treatment plan, medications, self-management, ADLs?: Yes  Has patient completed an Advanced Directive?: No  Has the Care Manager's phone number provided?: Yes  Is there anything else I can help you with?: No    Discharge Summary  Chief Complaint: N/V - Onset yesterday at 0900, pt reports last night she was unable to tolerate any PO intake. Pt given 4 mg of SL Zofran en route and reports improvement in symptoms. Pt denies ABD pain.  Admitting Diagnosis: EMS  Discharge Diagnosis: peritoneal carcinomatosis

## 2024-01-01 ENCOUNTER — HOME CARE VISIT (OUTPATIENT)
Dept: HOSPICE | Facility: HOSPICE | Age: 84
End: 2024-01-01
Payer: MEDICARE

## 2024-01-01 ENCOUNTER — HOSPITAL ENCOUNTER (INPATIENT)
Facility: MEDICAL CENTER | Age: 84
LOS: 5 days | DRG: 641 | End: 2024-01-07
Attending: EMERGENCY MEDICINE | Admitting: SPECIALIST
Payer: MEDICARE

## 2024-01-01 ENCOUNTER — HOSPICE ADMISSION (OUTPATIENT)
Dept: HOSPICE | Facility: HOSPICE | Age: 84
End: 2024-01-01
Payer: MEDICARE

## 2024-01-01 ENCOUNTER — TELEMEDICINE (OUTPATIENT)
Dept: MEDICAL GROUP | Facility: MEDICAL CENTER | Age: 84
End: 2024-01-01
Payer: MEDICARE

## 2024-01-01 ENCOUNTER — PHARMACY VISIT (OUTPATIENT)
Dept: PHARMACY | Facility: MEDICAL CENTER | Age: 84
End: 2024-01-01
Payer: COMMERCIAL

## 2024-01-01 ENCOUNTER — TELEPHONE (OUTPATIENT)
Dept: PALLIATIVE MEDICINE | Facility: HOSPICE | Age: 84
End: 2024-01-01
Payer: MEDICARE

## 2024-01-01 ENCOUNTER — APPOINTMENT (OUTPATIENT)
Dept: CARDIOLOGY | Facility: MEDICAL CENTER | Age: 84
DRG: 641 | End: 2024-01-01
Attending: STUDENT IN AN ORGANIZED HEALTH CARE EDUCATION/TRAINING PROGRAM
Payer: MEDICARE

## 2024-01-01 ENCOUNTER — APPOINTMENT (OUTPATIENT)
Dept: RADIOLOGY | Facility: MEDICAL CENTER | Age: 84
DRG: 641 | End: 2024-01-01
Attending: STUDENT IN AN ORGANIZED HEALTH CARE EDUCATION/TRAINING PROGRAM
Payer: MEDICARE

## 2024-01-01 ENCOUNTER — APPOINTMENT (OUTPATIENT)
Dept: RADIOLOGY | Facility: MEDICAL CENTER | Age: 84
DRG: 641 | End: 2024-01-01
Payer: MEDICARE

## 2024-01-01 ENCOUNTER — APPOINTMENT (OUTPATIENT)
Dept: RADIOLOGY | Facility: MEDICAL CENTER | Age: 84
DRG: 641 | End: 2024-01-01
Attending: EMERGENCY MEDICINE
Payer: MEDICARE

## 2024-01-01 ENCOUNTER — PATIENT OUTREACH (OUTPATIENT)
Dept: MEDICAL GROUP | Facility: MEDICAL CENTER | Age: 84
End: 2024-01-01
Payer: MEDICARE

## 2024-01-01 ENCOUNTER — TELEPHONE (OUTPATIENT)
Dept: MEDICAL GROUP | Facility: MEDICAL CENTER | Age: 84
End: 2024-01-01

## 2024-01-01 ENCOUNTER — PHARMACY VISIT (OUTPATIENT)
Dept: PHARMACY | Facility: MEDICAL CENTER | Age: 84
End: 2024-01-01

## 2024-01-01 ENCOUNTER — PATIENT MESSAGE (OUTPATIENT)
Dept: MEDICAL GROUP | Facility: MEDICAL CENTER | Age: 84
End: 2024-01-01
Payer: MEDICARE

## 2024-01-01 VITALS — DIASTOLIC BLOOD PRESSURE: 40 MMHG | RESPIRATION RATE: 24 BRPM | HEART RATE: 80 BPM | SYSTOLIC BLOOD PRESSURE: 104 MMHG

## 2024-01-01 VITALS — HEART RATE: 84 BPM | DIASTOLIC BLOOD PRESSURE: 50 MMHG | RESPIRATION RATE: 12 BRPM | SYSTOLIC BLOOD PRESSURE: 108 MMHG

## 2024-01-01 VITALS — DIASTOLIC BLOOD PRESSURE: 50 MMHG | SYSTOLIC BLOOD PRESSURE: 90 MMHG | RESPIRATION RATE: 12 BRPM | HEART RATE: 68 BPM

## 2024-01-01 VITALS — RESPIRATION RATE: 16 BRPM

## 2024-01-01 VITALS — RESPIRATION RATE: 20 BRPM | HEART RATE: 56 BPM | DIASTOLIC BLOOD PRESSURE: 56 MMHG | SYSTOLIC BLOOD PRESSURE: 90 MMHG

## 2024-01-01 VITALS
SYSTOLIC BLOOD PRESSURE: 120 MMHG | HEART RATE: 78 BPM | DIASTOLIC BLOOD PRESSURE: 57 MMHG | WEIGHT: 225 LBS | BODY MASS INDEX: 35.24 KG/M2

## 2024-01-01 VITALS
BODY MASS INDEX: 35.47 KG/M2 | SYSTOLIC BLOOD PRESSURE: 130 MMHG | TEMPERATURE: 97.9 F | WEIGHT: 225.97 LBS | HEIGHT: 67 IN | HEART RATE: 93 BPM | DIASTOLIC BLOOD PRESSURE: 57 MMHG | OXYGEN SATURATION: 98 % | RESPIRATION RATE: 16 BRPM

## 2024-01-01 VITALS — SYSTOLIC BLOOD PRESSURE: 90 MMHG | HEART RATE: 88 BPM | DIASTOLIC BLOOD PRESSURE: 56 MMHG

## 2024-01-01 VITALS — DIASTOLIC BLOOD PRESSURE: 60 MMHG | HEART RATE: 88 BPM | RESPIRATION RATE: 28 BRPM | SYSTOLIC BLOOD PRESSURE: 98 MMHG

## 2024-01-01 VITALS — SYSTOLIC BLOOD PRESSURE: 109 MMHG | HEART RATE: 80 BPM | DIASTOLIC BLOOD PRESSURE: 85 MMHG

## 2024-01-01 DIAGNOSIS — C78.6 PERITONEAL CARCINOMATOSIS (HCC): Primary | ICD-10-CM

## 2024-01-01 DIAGNOSIS — M25.561 BILATERAL CHRONIC KNEE PAIN: Chronic | ICD-10-CM

## 2024-01-01 DIAGNOSIS — R57.9 SHOCK (HCC): ICD-10-CM

## 2024-01-01 DIAGNOSIS — C78.6 PERITONEAL CARCINOMATOSIS (HCC): ICD-10-CM

## 2024-01-01 DIAGNOSIS — I10 ESSENTIAL HYPERTENSION: ICD-10-CM

## 2024-01-01 DIAGNOSIS — G89.29 BILATERAL CHRONIC KNEE PAIN: Chronic | ICD-10-CM

## 2024-01-01 DIAGNOSIS — F32.A MILD DEPRESSION: ICD-10-CM

## 2024-01-01 DIAGNOSIS — E66.01 MORBID OBESITY (HCC): Chronic | ICD-10-CM

## 2024-01-01 DIAGNOSIS — R11.2 INTRACTABLE NAUSEA AND VOMITING: ICD-10-CM

## 2024-01-01 DIAGNOSIS — Z79.891 CHRONIC USE OF OPIATE FOR THERAPEUTIC PURPOSE: ICD-10-CM

## 2024-01-01 DIAGNOSIS — R26.2 UNABLE TO WALK: ICD-10-CM

## 2024-01-01 DIAGNOSIS — Z85.42 HISTORY OF UTERINE CANCER: ICD-10-CM

## 2024-01-01 DIAGNOSIS — E86.0 DEHYDRATION: ICD-10-CM

## 2024-01-01 DIAGNOSIS — M25.551 BILATERAL HIP PAIN: ICD-10-CM

## 2024-01-01 DIAGNOSIS — F32.A MILD DEPRESSION: Chronic | ICD-10-CM

## 2024-01-01 DIAGNOSIS — D64.9 ANEMIA, UNSPECIFIED TYPE: ICD-10-CM

## 2024-01-01 DIAGNOSIS — R11.2 NAUSEA AND VOMITING, UNSPECIFIED VOMITING TYPE: ICD-10-CM

## 2024-01-01 DIAGNOSIS — I10 PRIMARY HYPERTENSION: ICD-10-CM

## 2024-01-01 DIAGNOSIS — R63.0 APPETITE IMPAIRED: ICD-10-CM

## 2024-01-01 DIAGNOSIS — K59.00 CONSTIPATION, UNSPECIFIED CONSTIPATION TYPE: ICD-10-CM

## 2024-01-01 DIAGNOSIS — M25.552 BILATERAL HIP PAIN: ICD-10-CM

## 2024-01-01 DIAGNOSIS — Z09 HOSPITAL DISCHARGE FOLLOW-UP: ICD-10-CM

## 2024-01-01 DIAGNOSIS — M25.562 BILATERAL CHRONIC KNEE PAIN: Chronic | ICD-10-CM

## 2024-01-01 DIAGNOSIS — K74.3 PRIMARY BILIARY CHOLANGITIS (HCC): Chronic | ICD-10-CM

## 2024-01-01 DIAGNOSIS — R60.0 LOWER EXTREMITY EDEMA: ICD-10-CM

## 2024-01-01 DIAGNOSIS — K42.9 UMBILICAL HERNIA WITHOUT OBSTRUCTION AND WITHOUT GANGRENE: ICD-10-CM

## 2024-01-01 LAB
ALBUMIN SERPL BCP-MCNC: 2.8 G/DL (ref 3.2–4.9)
ALBUMIN SERPL BCP-MCNC: 3 G/DL (ref 3.2–4.9)
ALBUMIN/GLOB SERPL: 0.8 G/DL
ALBUMIN/GLOB SERPL: 0.8 G/DL
ALP SERPL-CCNC: 126 U/L (ref 30–99)
ALP SERPL-CCNC: 134 U/L (ref 30–99)
ALT SERPL-CCNC: 7 U/L (ref 2–50)
ALT SERPL-CCNC: 8 U/L (ref 2–50)
ANION GAP SERPL CALC-SCNC: 10 MMOL/L (ref 7–16)
ANION GAP SERPL CALC-SCNC: 12 MMOL/L (ref 7–16)
ANION GAP SERPL CALC-SCNC: 16 MMOL/L (ref 7–16)
ANION GAP SERPL CALC-SCNC: 17 MMOL/L (ref 7–16)
APTT PPP: 32.4 SEC (ref 24.7–36)
AST SERPL-CCNC: 10 U/L (ref 12–45)
AST SERPL-CCNC: 12 U/L (ref 12–45)
BASOPHILS # BLD AUTO: 0.1 % (ref 0–1.8)
BASOPHILS # BLD AUTO: 0.2 % (ref 0–1.8)
BASOPHILS # BLD AUTO: 0.2 % (ref 0–1.8)
BASOPHILS # BLD AUTO: 0.3 % (ref 0–1.8)
BASOPHILS # BLD: 0.01 K/UL (ref 0–0.12)
BASOPHILS # BLD: 0.02 K/UL (ref 0–0.12)
BILIRUB SERPL-MCNC: 0.4 MG/DL (ref 0.1–1.5)
BILIRUB SERPL-MCNC: 0.6 MG/DL (ref 0.1–1.5)
BUN SERPL-MCNC: 23 MG/DL (ref 8–22)
BUN SERPL-MCNC: 32 MG/DL (ref 8–22)
BUN SERPL-MCNC: 34 MG/DL (ref 8–22)
BUN SERPL-MCNC: 36 MG/DL (ref 8–22)
CALCIUM ALBUM COR SERPL-MCNC: 10 MG/DL (ref 8.5–10.5)
CALCIUM ALBUM COR SERPL-MCNC: 9.9 MG/DL (ref 8.5–10.5)
CALCIUM SERPL-MCNC: 8.6 MG/DL (ref 8.5–10.5)
CALCIUM SERPL-MCNC: 8.9 MG/DL (ref 8.5–10.5)
CALCIUM SERPL-MCNC: 9.1 MG/DL (ref 8.5–10.5)
CALCIUM SERPL-MCNC: 9.2 MG/DL (ref 8.5–10.5)
CHLORIDE SERPL-SCNC: 92 MMOL/L (ref 96–112)
CHLORIDE SERPL-SCNC: 93 MMOL/L (ref 96–112)
CHLORIDE SERPL-SCNC: 98 MMOL/L (ref 96–112)
CHLORIDE SERPL-SCNC: 98 MMOL/L (ref 96–112)
CO2 SERPL-SCNC: 23 MMOL/L (ref 20–33)
CO2 SERPL-SCNC: 27 MMOL/L (ref 20–33)
CO2 SERPL-SCNC: 29 MMOL/L (ref 20–33)
CO2 SERPL-SCNC: 30 MMOL/L (ref 20–33)
CORTIS SERPL-MCNC: 28.8 UG/DL (ref 0–23)
CREAT SERPL-MCNC: 0.7 MG/DL (ref 0.5–1.4)
CREAT SERPL-MCNC: 0.99 MG/DL (ref 0.5–1.4)
CREAT SERPL-MCNC: 1.01 MG/DL (ref 0.5–1.4)
CREAT SERPL-MCNC: 1.18 MG/DL (ref 0.5–1.4)
EKG IMPRESSION: NORMAL
EOSINOPHIL # BLD AUTO: 0 K/UL (ref 0–0.51)
EOSINOPHIL # BLD AUTO: 0.04 K/UL (ref 0–0.51)
EOSINOPHIL # BLD AUTO: 0.07 K/UL (ref 0–0.51)
EOSINOPHIL # BLD AUTO: 0.16 K/UL (ref 0–0.51)
EOSINOPHIL NFR BLD: 0 % (ref 0–6.9)
EOSINOPHIL NFR BLD: 0.6 % (ref 0–6.9)
EOSINOPHIL NFR BLD: 1 % (ref 0–6.9)
EOSINOPHIL NFR BLD: 1.8 % (ref 0–6.9)
ERYTHROCYTE [DISTWIDTH] IN BLOOD BY AUTOMATED COUNT: 54.3 FL (ref 35.9–50)
ERYTHROCYTE [DISTWIDTH] IN BLOOD BY AUTOMATED COUNT: 56.1 FL (ref 35.9–50)
ERYTHROCYTE [DISTWIDTH] IN BLOOD BY AUTOMATED COUNT: 57.1 FL (ref 35.9–50)
ERYTHROCYTE [DISTWIDTH] IN BLOOD BY AUTOMATED COUNT: 57.2 FL (ref 35.9–50)
GFR SERPLBLD CREATININE-BSD FMLA CKD-EPI: 46 ML/MIN/1.73 M 2
GFR SERPLBLD CREATININE-BSD FMLA CKD-EPI: 55 ML/MIN/1.73 M 2
GFR SERPLBLD CREATININE-BSD FMLA CKD-EPI: 57 ML/MIN/1.73 M 2
GFR SERPLBLD CREATININE-BSD FMLA CKD-EPI: 86 ML/MIN/1.73 M 2
GLOBULIN SER CALC-MCNC: 3.5 G/DL (ref 1.9–3.5)
GLOBULIN SER CALC-MCNC: 3.8 G/DL (ref 1.9–3.5)
GLUCOSE SERPL-MCNC: 115 MG/DL (ref 65–99)
GLUCOSE SERPL-MCNC: 151 MG/DL (ref 65–99)
GLUCOSE SERPL-MCNC: 94 MG/DL (ref 65–99)
GLUCOSE SERPL-MCNC: 94 MG/DL (ref 65–99)
HCT VFR BLD AUTO: 24.7 % (ref 37–47)
HCT VFR BLD AUTO: 26.9 % (ref 37–47)
HCT VFR BLD AUTO: 30.8 % (ref 37–47)
HCT VFR BLD AUTO: 31.8 % (ref 37–47)
HCT VFR BLD AUTO: 34.3 % (ref 37–47)
HEMOCCULT STL QL: NEGATIVE
HGB BLD-MCNC: 10.4 G/DL (ref 12–16)
HGB BLD-MCNC: 10.7 G/DL (ref 12–16)
HGB BLD-MCNC: 7.9 G/DL (ref 12–16)
HGB BLD-MCNC: 8.6 G/DL (ref 12–16)
HGB BLD-MCNC: 9.7 G/DL (ref 12–16)
IMM GRANULOCYTES # BLD AUTO: 0.03 K/UL (ref 0–0.11)
IMM GRANULOCYTES # BLD AUTO: 0.03 K/UL (ref 0–0.11)
IMM GRANULOCYTES # BLD AUTO: 0.06 K/UL (ref 0–0.11)
IMM GRANULOCYTES # BLD AUTO: 0.08 K/UL (ref 0–0.11)
IMM GRANULOCYTES NFR BLD AUTO: 0.4 % (ref 0–0.9)
IMM GRANULOCYTES NFR BLD AUTO: 0.5 % (ref 0–0.9)
IMM GRANULOCYTES NFR BLD AUTO: 0.9 % (ref 0–0.9)
IMM GRANULOCYTES NFR BLD AUTO: 0.9 % (ref 0–0.9)
INR PPP: 1.15 (ref 0.87–1.13)
LACTATE SERPL-SCNC: 0.7 MMOL/L (ref 0.5–2)
LACTATE SERPL-SCNC: 1.1 MMOL/L (ref 0.5–2)
LIPASE SERPL-CCNC: 9 U/L (ref 11–82)
LV EJECT FRACT  99904: 60
LV EJECT FRACT MOD 2C 99903: 69.71
LV EJECT FRACT MOD 4C 99902: 69.81
LV EJECT FRACT MOD BP 99901: 69.26
LYMPHOCYTES # BLD AUTO: 0.48 K/UL (ref 1–4.8)
LYMPHOCYTES # BLD AUTO: 0.48 K/UL (ref 1–4.8)
LYMPHOCYTES # BLD AUTO: 0.52 K/UL (ref 1–4.8)
LYMPHOCYTES # BLD AUTO: 0.62 K/UL (ref 1–4.8)
LYMPHOCYTES NFR BLD: 6.8 % (ref 22–41)
LYMPHOCYTES NFR BLD: 7.1 % (ref 22–41)
LYMPHOCYTES NFR BLD: 7.4 % (ref 22–41)
LYMPHOCYTES NFR BLD: 7.5 % (ref 22–41)
MAGNESIUM SERPL-MCNC: 1.9 MG/DL (ref 1.5–2.5)
MAGNESIUM SERPL-MCNC: 2 MG/DL (ref 1.5–2.5)
MAGNESIUM SERPL-MCNC: 2.1 MG/DL (ref 1.5–2.5)
MCH RBC QN AUTO: 27.9 PG (ref 27–33)
MCH RBC QN AUTO: 28.2 PG (ref 27–33)
MCH RBC QN AUTO: 28.2 PG (ref 27–33)
MCH RBC QN AUTO: 28.3 PG (ref 27–33)
MCHC RBC AUTO-ENTMCNC: 31.2 G/DL (ref 32.2–35.5)
MCHC RBC AUTO-ENTMCNC: 31.5 G/DL (ref 32.2–35.5)
MCHC RBC AUTO-ENTMCNC: 32 G/DL (ref 32.2–35.5)
MCHC RBC AUTO-ENTMCNC: 32.7 G/DL (ref 32.2–35.5)
MCV RBC AUTO: 86.4 FL (ref 81.4–97.8)
MCV RBC AUTO: 88.2 FL (ref 81.4–97.8)
MCV RBC AUTO: 89.5 FL (ref 81.4–97.8)
MCV RBC AUTO: 89.6 FL (ref 81.4–97.8)
MONOCYTES # BLD AUTO: 0.16 K/UL (ref 0–0.85)
MONOCYTES # BLD AUTO: 0.76 K/UL (ref 0–0.85)
MONOCYTES # BLD AUTO: 0.77 K/UL (ref 0–0.85)
MONOCYTES # BLD AUTO: 0.96 K/UL (ref 0–0.85)
MONOCYTES NFR BLD AUTO: 10.4 % (ref 0–13.4)
MONOCYTES NFR BLD AUTO: 10.6 % (ref 0–13.4)
MONOCYTES NFR BLD AUTO: 12 % (ref 0–13.4)
MONOCYTES NFR BLD AUTO: 2.5 % (ref 0–13.4)
NEUTROPHILS # BLD AUTO: 5.11 K/UL (ref 1.82–7.42)
NEUTROPHILS # BLD AUTO: 5.8 K/UL (ref 1.82–7.42)
NEUTROPHILS # BLD AUTO: 5.9 K/UL (ref 1.82–7.42)
NEUTROPHILS # BLD AUTO: 7.24 K/UL (ref 1.82–7.42)
NEUTROPHILS NFR BLD: 79.2 % (ref 44–72)
NEUTROPHILS NFR BLD: 79.8 % (ref 44–72)
NEUTROPHILS NFR BLD: 80.8 % (ref 44–72)
NEUTROPHILS NFR BLD: 89 % (ref 44–72)
NRBC # BLD AUTO: 0 K/UL
NRBC BLD-RTO: 0 /100 WBC (ref 0–0.2)
PHOSPHATE SERPL-MCNC: 2.1 MG/DL (ref 2.5–4.5)
PHOSPHATE SERPL-MCNC: 2.4 MG/DL (ref 2.5–4.5)
PLATELET # BLD AUTO: 315 K/UL (ref 164–446)
PLATELET # BLD AUTO: 370 K/UL (ref 164–446)
PLATELET # BLD AUTO: 437 K/UL (ref 164–446)
PLATELET # BLD AUTO: 479 K/UL (ref 164–446)
PMV BLD AUTO: 9.2 FL (ref 9–12.9)
PMV BLD AUTO: 9.5 FL (ref 9–12.9)
PMV BLD AUTO: 9.6 FL (ref 9–12.9)
PMV BLD AUTO: 9.8 FL (ref 9–12.9)
POTASSIUM SERPL-SCNC: 3.4 MMOL/L (ref 3.6–5.5)
POTASSIUM SERPL-SCNC: 3.5 MMOL/L (ref 3.6–5.5)
POTASSIUM SERPL-SCNC: 3.7 MMOL/L (ref 3.6–5.5)
POTASSIUM SERPL-SCNC: 4.3 MMOL/L (ref 3.6–5.5)
PROCALCITONIN SERPL-MCNC: 0.19 NG/ML
PROT SERPL-MCNC: 6.3 G/DL (ref 6–8.2)
PROT SERPL-MCNC: 6.8 G/DL (ref 6–8.2)
PROTHROMBIN TIME: 14.9 SEC (ref 12–14.6)
RBC # BLD AUTO: 2.8 M/UL (ref 4.2–5.4)
RBC # BLD AUTO: 3.44 M/UL (ref 4.2–5.4)
RBC # BLD AUTO: 3.68 M/UL (ref 4.2–5.4)
RBC # BLD AUTO: 3.83 M/UL (ref 4.2–5.4)
SODIUM SERPL-SCNC: 133 MMOL/L (ref 135–145)
SODIUM SERPL-SCNC: 135 MMOL/L (ref 135–145)
SODIUM SERPL-SCNC: 138 MMOL/L (ref 135–145)
SODIUM SERPL-SCNC: 139 MMOL/L (ref 135–145)
T4 FREE SERPL-MCNC: 1.47 NG/DL (ref 0.93–1.7)
TROPONIN T SERPL-MCNC: 27 NG/L (ref 6–19)
TSH SERPL DL<=0.005 MIU/L-ACNC: 1.97 UIU/ML (ref 0.38–5.33)
WBC # BLD AUTO: 6.4 K/UL (ref 4.8–10.8)
WBC # BLD AUTO: 6.5 K/UL (ref 4.8–10.8)
WBC # BLD AUTO: 7.3 K/UL (ref 4.8–10.8)
WBC # BLD AUTO: 9.1 K/UL (ref 4.8–10.8)

## 2024-01-01 PROCEDURE — 84100 ASSAY OF PHOSPHORUS: CPT

## 2024-01-01 PROCEDURE — 700102 HCHG RX REV CODE 250 W/ 637 OVERRIDE(OP)

## 2024-01-01 PROCEDURE — 93005 ELECTROCARDIOGRAM TRACING: CPT | Performed by: EMERGENCY MEDICINE

## 2024-01-01 PROCEDURE — 700101 HCHG RX REV CODE 250

## 2024-01-01 PROCEDURE — 36556 INSERT NON-TUNNEL CV CATH: CPT

## 2024-01-01 PROCEDURE — S9126 HOSPICE CARE, IN THE HOME, P: HCPCS

## 2024-01-01 PROCEDURE — 93005 ELECTROCARDIOGRAM TRACING: CPT

## 2024-01-01 PROCEDURE — 36556 INSERT NON-TUNNEL CV CATH: CPT | Performed by: STUDENT IN AN ORGANIZED HEALTH CARE EDUCATION/TRAINING PROGRAM

## 2024-01-01 PROCEDURE — C1751 CATH, INF, PER/CENT/MIDLINE: HCPCS

## 2024-01-01 PROCEDURE — 99285 EMERGENCY DEPT VISIT HI MDM: CPT

## 2024-01-01 PROCEDURE — G0299 HHS/HOSPICE OF RN EA 15 MIN: HCPCS

## 2024-01-01 PROCEDURE — A9270 NON-COVERED ITEM OR SERVICE: HCPCS

## 2024-01-01 PROCEDURE — 36556 INSERT NON-TUNNEL CV CATH: CPT | Performed by: NURSE PRACTITIONER

## 2024-01-01 PROCEDURE — 71045 X-RAY EXAM CHEST 1 VIEW: CPT

## 2024-01-01 PROCEDURE — 93308 TTE F-UP OR LMTD: CPT | Mod: 26 | Performed by: INTERNAL MEDICINE

## 2024-01-01 PROCEDURE — RXMED WILLOW AMBULATORY MEDICATION CHARGE: Performed by: REHABILITATION PRACTITIONER

## 2024-01-01 PROCEDURE — 97535 SELF CARE MNGMENT TRAINING: CPT

## 2024-01-01 PROCEDURE — 93010 ELECTROCARDIOGRAM REPORT: CPT | Performed by: INTERNAL MEDICINE

## 2024-01-01 PROCEDURE — 700111 HCHG RX REV CODE 636 W/ 250 OVERRIDE (IP): Mod: JZ | Performed by: STUDENT IN AN ORGANIZED HEALTH CARE EDUCATION/TRAINING PROGRAM

## 2024-01-01 PROCEDURE — 700111 HCHG RX REV CODE 636 W/ 250 OVERRIDE (IP)

## 2024-01-01 PROCEDURE — 83735 ASSAY OF MAGNESIUM: CPT

## 2024-01-01 PROCEDURE — 700105 HCHG RX REV CODE 258: Performed by: EMERGENCY MEDICINE

## 2024-01-01 PROCEDURE — 700117 HCHG RX CONTRAST REV CODE 255: Performed by: EMERGENCY MEDICINE

## 2024-01-01 PROCEDURE — 93010 ELECTROCARDIOGRAM REPORT: CPT | Performed by: STUDENT IN AN ORGANIZED HEALTH CARE EDUCATION/TRAINING PROGRAM

## 2024-01-01 PROCEDURE — 99233 SBSQ HOSP IP/OBS HIGH 50: CPT | Performed by: EMERGENCY MEDICINE

## 2024-01-01 PROCEDURE — G0156 HHCP-SVS OF AIDE,EA 15 MIN: HCPCS

## 2024-01-01 PROCEDURE — 84145 PROCALCITONIN (PCT): CPT

## 2024-01-01 PROCEDURE — 700102 HCHG RX REV CODE 250 W/ 637 OVERRIDE(OP): Performed by: NURSE PRACTITIONER

## 2024-01-01 PROCEDURE — 80053 COMPREHEN METABOLIC PANEL: CPT

## 2024-01-01 PROCEDURE — 99222 1ST HOSP IP/OBS MODERATE 55: CPT | Performed by: INTERNAL MEDICINE

## 2024-01-01 PROCEDURE — 700105 HCHG RX REV CODE 258

## 2024-01-01 PROCEDURE — 99496 TRANSJ CARE MGMT HIGH F2F 7D: CPT | Mod: 95 | Performed by: INTERNAL MEDICINE

## 2024-01-01 PROCEDURE — 84443 ASSAY THYROID STIM HORMONE: CPT

## 2024-01-01 PROCEDURE — 85730 THROMBOPLASTIN TIME PARTIAL: CPT

## 2024-01-01 PROCEDURE — 700111 HCHG RX REV CODE 636 W/ 250 OVERRIDE (IP): Mod: JZ

## 2024-01-01 PROCEDURE — 85025 COMPLETE CBC W/AUTO DIFF WBC: CPT

## 2024-01-01 PROCEDURE — 80048 BASIC METABOLIC PNL TOTAL CA: CPT

## 2024-01-01 PROCEDURE — 700102 HCHG RX REV CODE 250 W/ 637 OVERRIDE(OP): Performed by: EMERGENCY MEDICINE

## 2024-01-01 PROCEDURE — A9270 NON-COVERED ITEM OR SERVICE: HCPCS | Performed by: NURSE PRACTITIONER

## 2024-01-01 PROCEDURE — 99214 OFFICE O/P EST MOD 30 MIN: CPT | Mod: 95 | Performed by: INTERNAL MEDICINE

## 2024-01-01 PROCEDURE — 700111 HCHG RX REV CODE 636 W/ 250 OVERRIDE (IP): Performed by: STUDENT IN AN ORGANIZED HEALTH CARE EDUCATION/TRAINING PROGRAM

## 2024-01-01 PROCEDURE — A9270 NON-COVERED ITEM OR SERVICE: HCPCS | Performed by: EMERGENCY MEDICINE

## 2024-01-01 PROCEDURE — 700102 HCHG RX REV CODE 250 W/ 637 OVERRIDE(OP): Performed by: STUDENT IN AN ORGANIZED HEALTH CARE EDUCATION/TRAINING PROGRAM

## 2024-01-01 PROCEDURE — A9270 NON-COVERED ITEM OR SERVICE: HCPCS | Performed by: STUDENT IN AN ORGANIZED HEALTH CARE EDUCATION/TRAINING PROGRAM

## 2024-01-01 PROCEDURE — 700101 HCHG RX REV CODE 250: Performed by: STUDENT IN AN ORGANIZED HEALTH CARE EDUCATION/TRAINING PROGRAM

## 2024-01-01 PROCEDURE — 84439 ASSAY OF FREE THYROXINE: CPT

## 2024-01-01 PROCEDURE — 93005 ELECTROCARDIOGRAM TRACING: CPT | Performed by: SPECIALIST

## 2024-01-01 PROCEDURE — P9045 ALBUMIN (HUMAN), 5%, 250 ML: HCPCS | Mod: JZ,JG | Performed by: EMERGENCY MEDICINE

## 2024-01-01 PROCEDURE — 85610 PROTHROMBIN TIME: CPT

## 2024-01-01 PROCEDURE — 85018 HEMOGLOBIN: CPT

## 2024-01-01 PROCEDURE — G0155 HHCP-SVS OF CSW,EA 15 MIN: HCPCS

## 2024-01-01 PROCEDURE — 770004 HCHG ROOM/CARE - ONCOLOGY PRIVATE *

## 2024-01-01 PROCEDURE — 700101 HCHG RX REV CODE 250: Performed by: EMERGENCY MEDICINE

## 2024-01-01 PROCEDURE — 770022 HCHG ROOM/CARE - ICU (200)

## 2024-01-01 PROCEDURE — 74177 CT ABD & PELVIS W/CONTRAST: CPT

## 2024-01-01 PROCEDURE — 84484 ASSAY OF TROPONIN QUANT: CPT

## 2024-01-01 PROCEDURE — 96375 TX/PRO/DX INJ NEW DRUG ADDON: CPT

## 2024-01-01 PROCEDURE — 36415 COLL VENOUS BLD VENIPUNCTURE: CPT

## 2024-01-01 PROCEDURE — 83605 ASSAY OF LACTIC ACID: CPT

## 2024-01-01 PROCEDURE — 82533 TOTAL CORTISOL: CPT

## 2024-01-01 PROCEDURE — 700105 HCHG RX REV CODE 258: Performed by: SPECIALIST

## 2024-01-01 PROCEDURE — 700111 HCHG RX REV CODE 636 W/ 250 OVERRIDE (IP): Mod: JZ | Performed by: EMERGENCY MEDICINE

## 2024-01-01 PROCEDURE — 96374 THER/PROPH/DIAG INJ IV PUSH: CPT

## 2024-01-01 PROCEDURE — 700111 HCHG RX REV CODE 636 W/ 250 OVERRIDE (IP): Mod: JZ,JG | Performed by: EMERGENCY MEDICINE

## 2024-01-01 PROCEDURE — 665036 HSPC NOTICE OF ELECTION NOE

## 2024-01-01 PROCEDURE — 05HY33Z INSERTION OF INFUSION DEVICE INTO UPPER VEIN, PERCUTANEOUS APPROACH: ICD-10-PCS | Performed by: STUDENT IN AN ORGANIZED HEALTH CARE EDUCATION/TRAINING PROGRAM

## 2024-01-01 PROCEDURE — 82272 OCCULT BLD FECES 1-3 TESTS: CPT

## 2024-01-01 PROCEDURE — 99291 CRITICAL CARE FIRST HOUR: CPT | Performed by: STUDENT IN AN ORGANIZED HEALTH CARE EDUCATION/TRAINING PROGRAM

## 2024-01-01 PROCEDURE — 700105 HCHG RX REV CODE 258: Performed by: STUDENT IN AN ORGANIZED HEALTH CARE EDUCATION/TRAINING PROGRAM

## 2024-01-01 PROCEDURE — 99291 CRITICAL CARE FIRST HOUR: CPT | Mod: 25 | Performed by: EMERGENCY MEDICINE

## 2024-01-01 PROCEDURE — 83690 ASSAY OF LIPASE: CPT

## 2024-01-01 PROCEDURE — 93325 DOPPLER ECHO COLOR FLOW MAPG: CPT

## 2024-01-01 PROCEDURE — 85014 HEMATOCRIT: CPT

## 2024-01-01 RX ORDER — SENNA AND DOCUSATE SODIUM 50; 8.6 MG/1; MG/1
2 TABLET, FILM COATED ORAL
Qty: 30 TABLET | Refills: 23 | Status: SHIPPED | OUTPATIENT
Start: 2024-01-01 | End: 2024-02-16

## 2024-01-01 RX ORDER — CITALOPRAM 40 MG/1
40 TABLET ORAL DAILY
Qty: 30 TABLET | Refills: 11 | Status: SHIPPED | OUTPATIENT
Start: 2024-01-01 | End: 2025-01-19

## 2024-01-01 RX ORDER — LORAZEPAM 2 MG/ML
.5-1 CONCENTRATE ORAL
Qty: 360 ML | Refills: 0 | Status: SHIPPED | OUTPATIENT
Start: 2024-01-01 | End: 2025-01-19

## 2024-01-01 RX ORDER — CITALOPRAM 20 MG/1
40 TABLET ORAL DAILY
Status: DISCONTINUED | OUTPATIENT
Start: 2024-01-01 | End: 2024-01-01 | Stop reason: HOSPADM

## 2024-01-01 RX ORDER — MEPERIDINE HYDROCHLORIDE 25 MG/ML
6.25 INJECTION INTRAMUSCULAR; INTRAVENOUS; SUBCUTANEOUS
Status: CANCELLED | OUTPATIENT
Start: 2024-01-01

## 2024-01-01 RX ORDER — MAGNESIUM SULFATE HEPTAHYDRATE 40 MG/ML
2 INJECTION, SOLUTION INTRAVENOUS ONCE
Status: COMPLETED | OUTPATIENT
Start: 2024-01-01 | End: 2024-01-01

## 2024-01-01 RX ORDER — SODIUM CHLORIDE 9 MG/ML
1000 INJECTION, SOLUTION INTRAVENOUS ONCE
Status: COMPLETED | OUTPATIENT
Start: 2024-01-01 | End: 2024-01-01

## 2024-01-01 RX ORDER — DIPHENHYDRAMINE HCL 25 MG
25 CAPSULE ORAL EVERY 6 HOURS PRN
Qty: 30 CAPSULE | Refills: 11 | Status: SHIPPED | OUTPATIENT
Start: 2024-01-01 | End: 2025-01-19

## 2024-01-01 RX ORDER — CALCIUM CARBONATE 500 MG/1
1000 TABLET, CHEWABLE ORAL 2 TIMES DAILY PRN
Status: DISCONTINUED | OUTPATIENT
Start: 2024-01-01 | End: 2024-01-01 | Stop reason: HOSPADM

## 2024-01-01 RX ORDER — HYDROCODONE BITARTRATE AND ACETAMINOPHEN 7.5; 325 MG/1; MG/1
1-2 TABLET ORAL EVERY 4 HOURS PRN
Qty: 240 TABLET | Refills: 0 | Status: SHIPPED | OUTPATIENT
Start: 2024-01-01 | End: 2025-01-19

## 2024-01-01 RX ORDER — DIPHENHYDRAMINE HYDROCHLORIDE 50 MG/ML
12.5 INJECTION INTRAMUSCULAR; INTRAVENOUS
Status: CANCELLED | OUTPATIENT
Start: 2024-01-01

## 2024-01-01 RX ORDER — ONDANSETRON 2 MG/ML
4 INJECTION INTRAMUSCULAR; INTRAVENOUS ONCE
Status: COMPLETED | OUTPATIENT
Start: 2024-01-01 | End: 2024-01-01

## 2024-01-01 RX ORDER — OMEPRAZOLE 20 MG/1
20 CAPSULE, DELAYED RELEASE ORAL DAILY
Status: DISCONTINUED | OUTPATIENT
Start: 2024-01-01 | End: 2024-01-01 | Stop reason: HOSPADM

## 2024-01-01 RX ORDER — BISACODYL 10 MG
10 SUPPOSITORY, RECTAL RECTAL
Status: DISCONTINUED | OUTPATIENT
Start: 2024-01-01 | End: 2024-01-01 | Stop reason: HOSPADM

## 2024-01-01 RX ORDER — POTASSIUM CHLORIDE 20MEQ/15ML
10 LIQUID (ML) ORAL DAILY
Qty: 125 ML | Refills: 11 | Status: SHIPPED | OUTPATIENT
Start: 2024-01-01 | End: 2024-01-01

## 2024-01-01 RX ORDER — FUROSEMIDE 40 MG/1
40 TABLET ORAL 2 TIMES DAILY PRN
Qty: 60 TABLET | Refills: 23 | Status: SHIPPED | OUTPATIENT
Start: 2024-01-01 | End: 2025-01-19

## 2024-01-01 RX ORDER — HYDRALAZINE HYDROCHLORIDE 20 MG/ML
5 INJECTION INTRAMUSCULAR; INTRAVENOUS
Status: CANCELLED | OUTPATIENT
Start: 2024-01-01

## 2024-01-01 RX ORDER — HYDROMORPHONE HYDROCHLORIDE 1 MG/ML
0.2 INJECTION, SOLUTION INTRAMUSCULAR; INTRAVENOUS; SUBCUTANEOUS
Status: CANCELLED | OUTPATIENT
Start: 2024-01-01

## 2024-01-01 RX ORDER — OXYCODONE HCL 5 MG/5 ML
10 SOLUTION, ORAL ORAL
Status: CANCELLED | OUTPATIENT
Start: 2024-01-01

## 2024-01-01 RX ORDER — ACETAMINOPHEN 650 MG/1
650 SUPPOSITORY RECTAL EVERY 6 HOURS PRN
Qty: 5 SUPPOSITORY | Refills: 10 | Status: SHIPPED | OUTPATIENT
Start: 2024-01-01 | End: 2025-01-19

## 2024-01-01 RX ORDER — POTASSIUM CHLORIDE 7.45 MG/ML
10 INJECTION INTRAVENOUS
Status: COMPLETED | OUTPATIENT
Start: 2024-01-01 | End: 2024-01-01

## 2024-01-01 RX ORDER — ONDANSETRON 4 MG/1
4 TABLET, ORALLY DISINTEGRATING ORAL EVERY 6 HOURS PRN
Status: DISCONTINUED | OUTPATIENT
Start: 2024-01-01 | End: 2024-01-01

## 2024-01-01 RX ORDER — SODIUM CHLORIDE, SODIUM LACTATE, POTASSIUM CHLORIDE, AND CALCIUM CHLORIDE .6; .31; .03; .02 G/100ML; G/100ML; G/100ML; G/100ML
500 INJECTION, SOLUTION INTRAVENOUS ONCE
Status: COMPLETED | OUTPATIENT
Start: 2024-01-01 | End: 2024-01-01

## 2024-01-01 RX ORDER — SODIUM CHLORIDE, SODIUM LACTATE, POTASSIUM CHLORIDE, AND CALCIUM CHLORIDE .6; .31; .03; .02 G/100ML; G/100ML; G/100ML; G/100ML
1000 INJECTION, SOLUTION INTRAVENOUS ONCE
Status: COMPLETED | OUTPATIENT
Start: 2024-01-01 | End: 2024-01-01

## 2024-01-01 RX ORDER — GAUZE BANDAGE 2" X 2"
100 BANDAGE TOPICAL DAILY
Status: DISCONTINUED | OUTPATIENT
Start: 2024-01-01 | End: 2024-01-01 | Stop reason: HOSPADM

## 2024-01-01 RX ORDER — AMLODIPINE BESYLATE 5 MG/1
5 TABLET ORAL DAILY
Status: DISCONTINUED | OUTPATIENT
Start: 2024-01-01 | End: 2024-01-01 | Stop reason: HOSPADM

## 2024-01-01 RX ORDER — ONDANSETRON 4 MG/1
4 TABLET, ORALLY DISINTEGRATING ORAL EVERY 6 HOURS PRN
Status: DISCONTINUED | OUTPATIENT
Start: 2024-01-01 | End: 2024-01-01 | Stop reason: HOSPADM

## 2024-01-01 RX ORDER — MIDODRINE HYDROCHLORIDE 5 MG/1
15 TABLET ORAL EVERY 8 HOURS
Status: DISCONTINUED | OUTPATIENT
Start: 2024-01-01 | End: 2024-01-01 | Stop reason: HOSPADM

## 2024-01-01 RX ORDER — AZELASTINE HYDROCHLORIDE 137 UG/1
1 SPRAY, METERED NASAL DAILY
Qty: 30 ML | Refills: 23 | Status: SHIPPED | OUTPATIENT
Start: 2024-01-01 | End: 2024-02-16

## 2024-01-01 RX ORDER — BISACODYL 10 MG
10 SUPPOSITORY, RECTAL RECTAL PRN
Qty: 5 SUPPOSITORY | Refills: 10 | Status: SHIPPED | OUTPATIENT
Start: 2024-01-01 | End: 2025-01-19

## 2024-01-01 RX ORDER — ACETAMINOPHEN 500 MG
1000 TABLET ORAL EVERY 6 HOURS PRN
Qty: 30 TABLET | Refills: 10 | Status: SHIPPED | OUTPATIENT
Start: 2024-01-01 | End: 2025-01-19

## 2024-01-01 RX ORDER — ALBUMIN, HUMAN INJ 5% 5 %
25 SOLUTION INTRAVENOUS ONCE
Status: COMPLETED | OUTPATIENT
Start: 2024-01-01 | End: 2024-01-01

## 2024-01-01 RX ORDER — PROCHLORPERAZINE EDISYLATE 5 MG/ML
10 INJECTION INTRAMUSCULAR; INTRAVENOUS EVERY 6 HOURS PRN
Status: DISCONTINUED | OUTPATIENT
Start: 2024-01-01 | End: 2024-01-01 | Stop reason: HOSPADM

## 2024-01-01 RX ORDER — HYDROCODONE BITARTRATE AND ACETAMINOPHEN 5; 325 MG/1; MG/1
1.5 TABLET ORAL EVERY 8 HOURS PRN
Status: DISCONTINUED | OUTPATIENT
Start: 2024-01-01 | End: 2024-01-01 | Stop reason: HOSPADM

## 2024-01-01 RX ORDER — SODIUM CHLORIDE, SODIUM LACTATE, POTASSIUM CHLORIDE, CALCIUM CHLORIDE 600; 310; 30; 20 MG/100ML; MG/100ML; MG/100ML; MG/100ML
INJECTION, SOLUTION INTRAVENOUS CONTINUOUS
Status: DISCONTINUED | OUTPATIENT
Start: 2024-01-01 | End: 2024-01-01 | Stop reason: HOSPADM

## 2024-01-01 RX ORDER — OXYCODONE HCL 5 MG/5 ML
5 SOLUTION, ORAL ORAL
Status: CANCELLED | OUTPATIENT
Start: 2024-01-01

## 2024-01-01 RX ORDER — SCOLOPAMINE TRANSDERMAL SYSTEM 1 MG/1
1 PATCH, EXTENDED RELEASE TRANSDERMAL
Qty: 5 PATCH | Refills: 23 | Status: SHIPPED | OUTPATIENT
Start: 2024-01-01 | End: 2025-01-19

## 2024-01-01 RX ORDER — ONDANSETRON 4 MG/1
4 TABLET, ORALLY DISINTEGRATING ORAL EVERY 4 HOURS PRN
Status: DISCONTINUED | OUTPATIENT
Start: 2024-01-01 | End: 2024-01-01

## 2024-01-01 RX ORDER — POLYETHYLENE GLYCOL 400 2.5 MG/ML
1 SOLUTION/ DROPS OPHTHALMIC EVERY EVENING
Qty: 1.5 ML | Refills: 11 | Status: SHIPPED | OUTPATIENT
Start: 2024-01-01 | End: 2025-01-19

## 2024-01-01 RX ORDER — POTASSIUM CHLORIDE 750 MG/1
TABLET, EXTENDED RELEASE ORAL
Qty: 30 TABLET | Refills: 4 | OUTPATIENT
Start: 2024-01-01

## 2024-01-01 RX ORDER — HALOPERIDOL 5 MG/ML
1 INJECTION INTRAMUSCULAR
Status: CANCELLED | OUTPATIENT
Start: 2024-01-01

## 2024-01-01 RX ORDER — METRONIDAZOLE 500 MG/100ML
500 INJECTION, SOLUTION INTRAVENOUS EVERY 12 HOURS
Status: DISCONTINUED | OUTPATIENT
Start: 2024-01-01 | End: 2024-01-01

## 2024-01-01 RX ORDER — PROCHLORPERAZINE EDISYLATE 5 MG/ML
10 INJECTION INTRAMUSCULAR; INTRAVENOUS ONCE
Status: COMPLETED | OUTPATIENT
Start: 2024-01-01 | End: 2024-01-01

## 2024-01-01 RX ORDER — ENOXAPARIN SODIUM 100 MG/ML
40 INJECTION SUBCUTANEOUS EVERY EVENING
Status: DISCONTINUED | OUTPATIENT
Start: 2024-01-01 | End: 2024-01-01 | Stop reason: HOSPADM

## 2024-01-01 RX ORDER — AMOXICILLIN 250 MG
2 CAPSULE ORAL 2 TIMES DAILY
Status: DISCONTINUED | OUTPATIENT
Start: 2024-01-01 | End: 2024-01-01 | Stop reason: HOSPADM

## 2024-01-01 RX ORDER — METHYLPREDNISOLONE SODIUM SUCCINATE 125 MG/2ML
125 INJECTION, POWDER, LYOPHILIZED, FOR SOLUTION INTRAMUSCULAR; INTRAVENOUS ONCE
Status: COMPLETED | OUTPATIENT
Start: 2024-01-01 | End: 2024-01-01

## 2024-01-01 RX ORDER — OMEPRAZOLE 20 MG/1
20 TABLET, DELAYED RELEASE ORAL DAILY
Qty: 30 TABLET | Refills: 11 | Status: SHIPPED | OUTPATIENT
Start: 2024-01-01 | End: 2025-01-19

## 2024-01-01 RX ORDER — LOSARTAN POTASSIUM 50 MG/1
100 TABLET ORAL EVERY EVENING
Status: DISCONTINUED | OUTPATIENT
Start: 2024-01-01 | End: 2024-01-01 | Stop reason: HOSPADM

## 2024-01-01 RX ORDER — ONDANSETRON 4 MG/1
4-8 TABLET, ORALLY DISINTEGRATING ORAL EVERY 6 HOURS PRN
Qty: 15 TABLET | Refills: 10 | Status: SHIPPED | OUTPATIENT
Start: 2024-01-01 | End: 2025-01-19

## 2024-01-01 RX ORDER — LABETALOL HYDROCHLORIDE 5 MG/ML
5 INJECTION, SOLUTION INTRAVENOUS
Status: CANCELLED | OUTPATIENT
Start: 2024-01-01

## 2024-01-01 RX ORDER — HYDROMORPHONE HYDROCHLORIDE 1 MG/ML
0.1 INJECTION, SOLUTION INTRAMUSCULAR; INTRAVENOUS; SUBCUTANEOUS
Status: CANCELLED | OUTPATIENT
Start: 2024-01-01

## 2024-01-01 RX ORDER — NOREPINEPHRINE BITARTRATE 0.03 MG/ML
0-1 INJECTION, SOLUTION INTRAVENOUS CONTINUOUS
Status: DISCONTINUED | OUTPATIENT
Start: 2024-01-01 | End: 2024-01-01

## 2024-01-01 RX ORDER — MORPHINE SULFATE 100 MG/5ML
5-10 SOLUTION ORAL
Qty: 240 ML | Refills: 0 | Status: SHIPPED | OUTPATIENT
Start: 2024-01-01 | End: 2025-01-19

## 2024-01-01 RX ORDER — CARBOXYMETHYLCELLULOSE SODIUM 5 MG/ML
3-4 SOLUTION/ DROPS OPHTHALMIC PRN
Qty: 15 ML | Refills: 11 | Status: SHIPPED | OUTPATIENT
Start: 2024-01-01 | End: 2025-01-19

## 2024-01-01 RX ORDER — POLYETHYLENE GLYCOL 3350 17 G/17G
1 POWDER, FOR SOLUTION ORAL
Status: DISCONTINUED | OUTPATIENT
Start: 2024-01-01 | End: 2024-01-01 | Stop reason: HOSPADM

## 2024-01-01 RX ORDER — PROCHLORPERAZINE MALEATE 10 MG
10 TABLET ORAL EVERY 6 HOURS PRN
Status: DISCONTINUED | OUTPATIENT
Start: 2024-01-01 | End: 2024-01-01 | Stop reason: HOSPADM

## 2024-01-01 RX ORDER — AMLODIPINE BESYLATE 5 MG/1
5 TABLET ORAL DAILY
Qty: 90 TABLET | Refills: 3 | Status: SHIPPED | OUTPATIENT
Start: 2024-01-01 | End: 2024-01-01

## 2024-01-01 RX ORDER — SENNA AND DOCUSATE SODIUM 50; 8.6 MG/1; MG/1
2 TABLET, FILM COATED ORAL 2 TIMES DAILY
Qty: 60 TABLET | Refills: 23 | Status: SHIPPED | OUTPATIENT
Start: 2024-01-01 | End: 2024-01-01 | Stop reason: SDUPTHER

## 2024-01-01 RX ORDER — HYDROMORPHONE HYDROCHLORIDE 1 MG/ML
0.4 INJECTION, SOLUTION INTRAMUSCULAR; INTRAVENOUS; SUBCUTANEOUS
Status: CANCELLED | OUTPATIENT
Start: 2024-01-01

## 2024-01-01 RX ORDER — EPHEDRINE SULFATE 50 MG/ML
5 INJECTION, SOLUTION INTRAVENOUS
Status: CANCELLED | OUTPATIENT
Start: 2024-01-01

## 2024-01-01 RX ORDER — METOPROLOL TARTRATE 1 MG/ML
1 INJECTION, SOLUTION INTRAVENOUS
Status: CANCELLED | OUTPATIENT
Start: 2024-01-01

## 2024-01-01 RX ORDER — GUAIFENESIN 600 MG/1
TABLET, EXTENDED RELEASE ORAL
Qty: 28 TABLET | Refills: 4 | OUTPATIENT
Start: 2024-01-01

## 2024-01-01 RX ORDER — MORPHINE SULFATE 4 MG/ML
2 INJECTION INTRAVENOUS
Status: DISCONTINUED | OUTPATIENT
Start: 2024-01-01 | End: 2024-01-01 | Stop reason: HOSPADM

## 2024-01-01 RX ORDER — SODIUM CHLORIDE, SODIUM LACTATE, POTASSIUM CHLORIDE, CALCIUM CHLORIDE 600; 310; 30; 20 MG/100ML; MG/100ML; MG/100ML; MG/100ML
INJECTION, SOLUTION INTRAVENOUS CONTINUOUS
Status: CANCELLED | OUTPATIENT
Start: 2024-01-01

## 2024-01-01 RX ORDER — ONDANSETRON 2 MG/ML
4 INJECTION INTRAMUSCULAR; INTRAVENOUS
Status: CANCELLED | OUTPATIENT
Start: 2024-01-01

## 2024-01-01 RX ORDER — ACETAMINOPHEN 325 MG/1
650 TABLET ORAL EVERY 6 HOURS PRN
Status: DISCONTINUED | OUTPATIENT
Start: 2024-01-01 | End: 2024-01-01 | Stop reason: HOSPADM

## 2024-01-01 RX ORDER — ACETAMINOPHEN 500 MG
1000 TABLET ORAL ONCE
Status: CANCELLED | OUTPATIENT
Start: 2024-01-01 | End: 2024-01-01

## 2024-01-01 RX ORDER — MIDODRINE HYDROCHLORIDE 5 MG/1
15 TABLET ORAL
Status: DISCONTINUED | OUTPATIENT
Start: 2024-01-01 | End: 2024-01-01

## 2024-01-01 RX ORDER — URSODIOL 300 MG/1
300 CAPSULE ORAL 2 TIMES DAILY WITH MEALS
Qty: 60 CAPSULE | Refills: 11 | Status: SHIPPED | OUTPATIENT
Start: 2024-01-01 | End: 2024-02-16

## 2024-01-01 RX ORDER — BISACODYL 10 MG
10 SUPPOSITORY, RECTAL RECTAL DAILY
Qty: 30 SUPPOSITORY | Refills: 0 | Status: SHIPPED | OUTPATIENT
Start: 2024-01-01 | End: 2024-01-01

## 2024-01-01 RX ORDER — DIPHENHYDRAMINE HYDROCHLORIDE 50 MG/ML
12.5 INJECTION INTRAMUSCULAR; INTRAVENOUS ONCE
Status: COMPLETED | OUTPATIENT
Start: 2024-01-01 | End: 2024-01-01

## 2024-01-01 RX ADMIN — LORAZEPAM 1 MG: 2 CONCENTRATE ORAL at 11:25

## 2024-01-01 RX ADMIN — PROCHLORPERAZINE EDISYLATE 10 MG: 5 INJECTION INTRAMUSCULAR; INTRAVENOUS at 13:22

## 2024-01-01 RX ADMIN — METRONIDAZOLE 500 MG: 500 INJECTION, SOLUTION INTRAVENOUS at 05:40

## 2024-01-01 RX ADMIN — PROCHLORPERAZINE EDISYLATE 10 MG: 5 INJECTION, SOLUTION INTRAMUSCULAR; INTRAVENOUS at 16:31

## 2024-01-01 RX ADMIN — PROCHLORPERAZINE MALEATE 10 MG: 10 TABLET ORAL at 15:48

## 2024-01-01 RX ADMIN — ALBUMIN (HUMAN) 25 G: 2.5 SOLUTION INTRAVENOUS at 17:22

## 2024-01-01 RX ADMIN — ENOXAPARIN SODIUM 40 MG: 100 INJECTION SUBCUTANEOUS at 17:29

## 2024-01-01 RX ADMIN — ENOXAPARIN SODIUM 40 MG: 100 INJECTION SUBCUTANEOUS at 21:46

## 2024-01-01 RX ADMIN — MIDODRINE HYDROCHLORIDE 15 MG: 5 TABLET ORAL at 22:22

## 2024-01-01 RX ADMIN — HYDROCODONE BITARTRATE AND ACETAMINOPHEN 1.5 TABLET: 5; 325 TABLET ORAL at 11:17

## 2024-01-01 RX ADMIN — METRONIDAZOLE 500 MG: 500 INJECTION, SOLUTION INTRAVENOUS at 17:31

## 2024-01-01 RX ADMIN — MIDODRINE HYDROCHLORIDE 15 MG: 5 TABLET ORAL at 09:04

## 2024-01-01 RX ADMIN — DOCUSATE SODIUM 50 MG AND SENNOSIDES 8.6 MG 2 TABLET: 8.6; 5 TABLET, FILM COATED ORAL at 21:46

## 2024-01-01 RX ADMIN — Medication 100 MG: at 10:48

## 2024-01-01 RX ADMIN — ENOXAPARIN SODIUM 40 MG: 100 INJECTION SUBCUTANEOUS at 17:11

## 2024-01-01 RX ADMIN — CEFTRIAXONE SODIUM 2000 MG: 10 INJECTION, POWDER, FOR SOLUTION INTRAVENOUS at 05:35

## 2024-01-01 RX ADMIN — SODIUM CHLORIDE, POTASSIUM CHLORIDE, SODIUM LACTATE AND CALCIUM CHLORIDE 500 ML: 600; 310; 30; 20 INJECTION, SOLUTION INTRAVENOUS at 20:56

## 2024-01-01 RX ADMIN — SODIUM CHLORIDE 1000 ML: 9 INJECTION, SOLUTION INTRAVENOUS at 13:21

## 2024-01-01 RX ADMIN — PROCHLORPERAZINE EDISYLATE 10 MG: 5 INJECTION, SOLUTION INTRAMUSCULAR; INTRAVENOUS at 22:57

## 2024-01-01 RX ADMIN — MIDODRINE HYDROCHLORIDE 15 MG: 5 TABLET ORAL at 05:35

## 2024-01-01 RX ADMIN — POTASSIUM PHOSPHATE, MONOBASIC AND POTASSIUM PHOSPHATE, DIBASIC 15 MMOL: 224; 236 INJECTION, SOLUTION, CONCENTRATE INTRAVENOUS at 11:06

## 2024-01-01 RX ADMIN — METHYLPREDNISOLONE SODIUM SUCCINATE 125 MG: 125 INJECTION, POWDER, FOR SOLUTION INTRAMUSCULAR; INTRAVENOUS at 15:12

## 2024-01-01 RX ADMIN — MIDODRINE HYDROCHLORIDE 15 MG: 5 TABLET ORAL at 00:14

## 2024-01-01 RX ADMIN — AMLODIPINE BESYLATE 5 MG: 5 TABLET ORAL at 21:45

## 2024-01-01 RX ADMIN — POLYETHYLENE GLYCOL-3350 AND ELECTROLYTES 4 L: 236; 6.74; 5.86; 2.97; 22.74 POWDER, FOR SOLUTION ORAL at 15:04

## 2024-01-01 RX ADMIN — MIDODRINE HYDROCHLORIDE 15 MG: 5 TABLET ORAL at 13:41

## 2024-01-01 RX ADMIN — PROCHLORPERAZINE EDISYLATE 10 MG: 5 INJECTION, SOLUTION INTRAMUSCULAR; INTRAVENOUS at 09:00

## 2024-01-01 RX ADMIN — SODIUM CHLORIDE, POTASSIUM CHLORIDE, SODIUM LACTATE AND CALCIUM CHLORIDE: 600; 310; 30; 20 INJECTION, SOLUTION INTRAVENOUS at 18:49

## 2024-01-01 RX ADMIN — DIPHENHYDRAMINE HYDROCHLORIDE 12.5 MG: 50 INJECTION, SOLUTION INTRAMUSCULAR; INTRAVENOUS at 13:21

## 2024-01-01 RX ADMIN — SODIUM CHLORIDE, POTASSIUM CHLORIDE, SODIUM LACTATE AND CALCIUM CHLORIDE: 600; 310; 30; 20 INJECTION, SOLUTION INTRAVENOUS at 16:04

## 2024-01-01 RX ADMIN — MAGNESIUM SULFATE HEPTAHYDRATE 2 G: 2 INJECTION, SOLUTION INTRAVENOUS at 09:36

## 2024-01-01 RX ADMIN — DOCUSATE SODIUM 50 MG AND SENNOSIDES 8.6 MG 2 TABLET: 8.6; 5 TABLET, FILM COATED ORAL at 06:07

## 2024-01-01 RX ADMIN — HYDROCODONE BITARTRATE AND ACETAMINOPHEN 1.5 TABLET: 5; 325 TABLET ORAL at 20:01

## 2024-01-01 RX ADMIN — ENOXAPARIN SODIUM 40 MG: 100 INJECTION SUBCUTANEOUS at 17:36

## 2024-01-01 RX ADMIN — OMEPRAZOLE 20 MG: 20 CAPSULE, DELAYED RELEASE ORAL at 05:35

## 2024-01-01 RX ADMIN — MORPHINE SULFATE 2 MG: 4 INJECTION, SOLUTION INTRAMUSCULAR; INTRAVENOUS at 19:19

## 2024-01-01 RX ADMIN — ONDANSETRON 4 MG: 2 INJECTION INTRAMUSCULAR; INTRAVENOUS at 13:21

## 2024-01-01 RX ADMIN — SODIUM CHLORIDE, POTASSIUM CHLORIDE, SODIUM LACTATE AND CALCIUM CHLORIDE 1000 ML: 600; 310; 30; 20 INJECTION, SOLUTION INTRAVENOUS at 23:29

## 2024-01-01 RX ADMIN — ONDANSETRON 4 MG: 4 TABLET, ORALLY DISINTEGRATING ORAL at 13:46

## 2024-01-01 RX ADMIN — OMEPRAZOLE 20 MG: 20 CAPSULE, DELAYED RELEASE ORAL at 15:19

## 2024-01-01 RX ADMIN — SODIUM CHLORIDE, POTASSIUM CHLORIDE, SODIUM LACTATE AND CALCIUM CHLORIDE: 600; 310; 30; 20 INJECTION, SOLUTION INTRAVENOUS at 05:50

## 2024-01-01 RX ADMIN — FAMOTIDINE 20 MG: 10 INJECTION, SOLUTION INTRAVENOUS at 15:12

## 2024-01-01 RX ADMIN — MIDODRINE HYDROCHLORIDE 15 MG: 5 TABLET ORAL at 12:33

## 2024-01-01 RX ADMIN — CITALOPRAM HYDROBROMIDE 40 MG: 20 TABLET ORAL at 06:07

## 2024-01-01 RX ADMIN — POTASSIUM CHLORIDE 10 MEQ: 7.46 INJECTION, SOLUTION INTRAVENOUS at 20:14

## 2024-01-01 RX ADMIN — IOHEXOL 100 ML: 350 INJECTION, SOLUTION INTRAVENOUS at 15:30

## 2024-01-01 RX ADMIN — SODIUM CHLORIDE, POTASSIUM CHLORIDE, SODIUM LACTATE AND CALCIUM CHLORIDE: 600; 310; 30; 20 INJECTION, SOLUTION INTRAVENOUS at 02:44

## 2024-01-01 RX ADMIN — ANTACID TABLETS 1000 MG: 500 TABLET, CHEWABLE ORAL at 18:48

## 2024-01-01 RX ADMIN — POTASSIUM CHLORIDE 10 MEQ: 7.46 INJECTION, SOLUTION INTRAVENOUS at 21:50

## 2024-01-01 RX ADMIN — PROCHLORPERAZINE EDISYLATE 10 MG: 5 INJECTION, SOLUTION INTRAMUSCULAR; INTRAVENOUS at 14:01

## 2024-01-01 RX ADMIN — NOREPINEPHRINE BITARTRATE 0.05 MCG/KG/MIN: 1 INJECTION, SOLUTION, CONCENTRATE INTRAVENOUS at 03:46

## 2024-01-01 RX ADMIN — MORPHINE SULFATE 2 MG: 4 INJECTION, SOLUTION INTRAMUSCULAR; INTRAVENOUS at 15:18

## 2024-01-01 RX ADMIN — LOSARTAN POTASSIUM 100 MG: 50 TABLET, FILM COATED ORAL at 21:45

## 2024-01-01 RX ADMIN — AMLODIPINE BESYLATE 5 MG: 5 TABLET ORAL at 17:35

## 2024-01-01 RX ADMIN — MIDODRINE HYDROCHLORIDE 15 MG: 5 TABLET ORAL at 17:29

## 2024-01-01 RX ADMIN — SODIUM CHLORIDE, POTASSIUM CHLORIDE, SODIUM LACTATE AND CALCIUM CHLORIDE: 600; 310; 30; 20 INJECTION, SOLUTION INTRAVENOUS at 10:55

## 2024-01-01 RX ADMIN — PROCHLORPERAZINE EDISYLATE 10 MG: 5 INJECTION, SOLUTION INTRAMUSCULAR; INTRAVENOUS at 00:46

## 2024-01-01 RX ADMIN — OMEPRAZOLE 20 MG: 20 CAPSULE, DELAYED RELEASE ORAL at 06:07

## 2024-01-01 SDOH — ECONOMIC STABILITY: HOUSING INSECURITY: EVIDENCE OF SMOKING MATERIAL: 0

## 2024-01-01 SDOH — ECONOMIC STABILITY: GENERAL

## 2024-01-01 SDOH — ECONOMIC STABILITY: HOUSING INSECURITY: HOME SAFETY: BUILDING IS A NO SMOKING FACILITY

## 2024-01-01 ASSESSMENT — COGNITIVE AND FUNCTIONAL STATUS - GENERAL
SUGGESTED CMS G CODE MODIFIER DAILY ACTIVITY: CK
TOILETING: A LITTLE
SUGGESTED CMS G CODE MODIFIER MOBILITY: CL
DAILY ACTIVITIY SCORE: 19
TURNING FROM BACK TO SIDE WHILE IN FLAT BAD: A LITTLE
HELP NEEDED FOR BATHING: A LOT
DRESSING REGULAR LOWER BODY CLOTHING: A LOT
MOBILITY SCORE: 13
MOVING FROM LYING ON BACK TO SITTING ON SIDE OF FLAT BED: A LOT
WALKING IN HOSPITAL ROOM: A LOT
MOVING TO AND FROM BED TO CHAIR: A LOT
CLIMB 3 TO 5 STEPS WITH RAILING: A LOT
STANDING UP FROM CHAIR USING ARMS: A LOT

## 2024-01-01 ASSESSMENT — SOCIAL DETERMINANTS OF HEALTH (SDOH)
ACTIVE STRESSOR - HEALTH CHANGES: 1
ACTIVE STRESSOR - LOSS OF CONTROL: 1
ACTIVE STRESSOR - HEALTH CHANGES: 1

## 2024-01-01 ASSESSMENT — ENCOUNTER SYMPTOMS
BOWEL INCONTINENCE: 1
COUGH: 1
MUSCLE WEAKNESS: 1
PERSON REPORTING PAIN: PATIENT
CONSTIPATION: 1
DENIES PAIN: 1
PAIN: 1
PERSON REPORTING PAIN: PATIENT
DENIES PAIN: 1
VOMITING: 1
DENIES PAIN: 1
SPUTUM PRODUCTION: 0
LAST BOWEL MOVEMENT: 66860
BOWEL PATTERN NORMAL: 1
LIMITED RANGE OF MOTION: 1
MUSCLE WEAKNESS: 1
VOMITING: 1
ABDOMINAL PAIN: 0
EYES NEGATIVE: 1
NAUSEA: 1
LAST BOWEL MOVEMENT: 66878
LAST BOWEL MOVEMENT: 66861
LAST BOWEL MOVEMENT: 66882
MUSCLE WEAKNESS: 1
PAIN LOCATION - PAIN QUALITY: ACHY
SLEEP QUALITY: ADEQUATE
LOWER EXTREMITY EDEMA: 1
HEADACHES: 0
DESCRIPTION OF MEMORY LOSS: SHORT TERM
FORGETFULNESS: 1
PERSON REPORTING PAIN: PATIENT
FATIGUE: 1
STOOL FREQUENCY: LESS THAN DAILY
INDIGESTION: 1
DESCRIPTION OF MEMORY LOSS: IMMEDIATE
FORGETFULNESS: 1
HIGHEST PAIN SEVERITY IN PAST 24 HOURS: 6/10
NAUSEA: 1
FATIGUES EASILY: 1
DENIES PAIN: 1
DRY SKIN: 1
DESCRIPTION OF MEMORY LOSS: SHORT TERM
MUSCLE WEAKNESS: 1
CONSTIPATION: 1
SLEEP QUALITY: FAIR
WEAKNESS: 1
SLEEP QUALITY: ADEQUATE
DRY SKIN: 1
LIMITED RANGE OF MOTION: 1
DRY SKIN: 1
FATIGUES EASILY: 1
SPUTUM PRODUCTION: 1
PERSON REPORTING PAIN: PATIENT
NAUSEA: 1
DENIES PAIN: 1
FATIGUE: 1
LOWER EXTREMITY EDEMA: 1
PAIN LOCATION - PAIN SEVERITY: 4/10
NAUSEA: 1
PERSON REPORTING PAIN: PATIENT
INDIGESTION: 1
DEPRESSION: 0
LIMITED RANGE OF MOTION: 1
WEIGHT LOSS: 1
MUSCLE WEAKNESS: 1
FOCAL WEAKNESS: 0
LIMITED RANGE OF MOTION: 1
LIMITED RANGE OF MOTION: 1
LOWER EXTREMITY EDEMA: 1
NAUSEA: 1
PAIN LOCATION: GENERALIZED ARTHRITIC PAIN
PAIN LOCATION - PAIN FREQUENCY: INFREQUENT
PAIN SEVERITY GOAL: 2/10
DENIES PAIN: 1
PERSON REPORTING PAIN: PATIENT
LOWEST PAIN SEVERITY IN PAST 24 HOURS: 3/10
SORE THROAT: 0
VOMITING: 1
MUSCLE WEAKNESS: 1
PAIN LOCATION - PAIN QUALITY: ACHY
DENIES PAIN: 1
VOMITING: 1
LOWER EXTREMITY EDEMA: 1
NAUSEA: 1
PERSON REPORTING PAIN: PATIENT
SHORTNESS OF BREATH: 0
BOWEL PATTERN NORMAL: 1
LAST BOWEL MOVEMENT: 66874
ABDOMINAL PAIN: 1
FEVER: 0
STOOL FREQUENCY: LESS THAN DAILY
LAST BOWEL MOVEMENT: 66871
ABDOMINAL PAIN: 1
FORGETFULNESS: 1
VOMITING: 1
DESCRIPTION OF MEMORY LOSS: SHORT TERM
PAIN LOCATION - PAIN FREQUENCY: INFREQUENT
SPUTUM AMOUNT: MODERATE
FATIGUES EASILY: 1
FATIGUES EASILY: 1
STOOL FREQUENCY: LESS THAN DAILY
MUSCULOSKELETAL NEGATIVE: 1
SPUTUM CONSISTENCY: FROTHY
PAIN LOCATION: ABDOMEN
LAST BOWEL MOVEMENT: 66849
STOOL FREQUENCY: TWICE DAILY
VOMITING: 0
COUGH CHARACTERISTICS: PRODUCTIVE
SHORTNESS OF BREATH: 0
STOOL FREQUENCY: LESS THAN DAILY
NAUSEA: 1
FALLS: 0
NAUSEA: 1
LIMITED RANGE OF MOTION: 1
SLEEP QUALITY: ADEQUATE
NAUSEA: 1
PERSON REPORTING PAIN: PATIENT
PAIN LOCATION - PAIN SEVERITY: 3/10
CHILLS: 0
LOWER EXTREMITY EDEMA: 1
FATIGUE: 1
FATIGUE: 1
DRY SKIN: 1
PALPITATIONS: 0
BOWEL PATTERN NORMAL: 1
FATIGUE: 1
BOWEL INCONTINENCE: 1
DYSPNEA ON EXERTION: 1
SUBJECTIVE PAIN PROGRESSION: WAXING AND WANING

## 2024-01-01 ASSESSMENT — LIFESTYLE VARIABLES
AVERAGE NUMBER OF DAYS PER WEEK YOU HAVE A DRINK CONTAINING ALCOHOL: 0
HAVE YOU EVER FELT YOU SHOULD CUT DOWN ON YOUR DRINKING: NO
CONSUMPTION TOTAL: NEGATIVE
EVER HAD A DRINK FIRST THING IN THE MORNING TO STEADY YOUR NERVES TO GET RID OF A HANGOVER: NO
TOTAL SCORE: 0
ALCOHOL_USE: NO
TOTAL SCORE: 0
ON A TYPICAL DAY WHEN YOU DRINK ALCOHOL HOW MANY DRINKS DO YOU HAVE: 0
HOW MANY TIMES IN THE PAST YEAR HAVE YOU HAD 5 OR MORE DRINKS IN A DAY: 0
HAVE PEOPLE ANNOYED YOU BY CRITICIZING YOUR DRINKING: NO
EVER FELT BAD OR GUILTY ABOUT YOUR DRINKING: NO
TOTAL SCORE: 0

## 2024-01-01 ASSESSMENT — PAIN DESCRIPTION - PAIN TYPE
TYPE: ACUTE PAIN
TYPE: ACUTE PAIN
TYPE: CHRONIC PAIN
TYPE: CHRONIC PAIN
TYPE: ACUTE PAIN
TYPE: ACUTE PAIN;CHRONIC PAIN
TYPE: ACUTE PAIN;CHRONIC PAIN
TYPE: CHRONIC PAIN
TYPE: CHRONIC PAIN
TYPE: ACUTE PAIN
TYPE: CHRONIC PAIN
TYPE: ACUTE PAIN
TYPE: CHRONIC PAIN
TYPE: CHRONIC PAIN
TYPE: ACUTE PAIN
TYPE: ACUTE PAIN;CHRONIC PAIN
TYPE: CHRONIC PAIN
TYPE: ACUTE PAIN
TYPE: CHRONIC PAIN

## 2024-01-01 ASSESSMENT — ACTIVITIES OF DAILY LIVING (ADL)
AMBULATION ASSISTANCE: NON-AMBULATORY
TOILETING_REQUIRES_ASSISTANCE: 1
PHYSICAL_TRANSFER_REQUIRES_ASSISTANCE: 1
SHOPPING_REQUIRES_ASSISTANCE: 1
AMBULATION ASSISTANCE: NON-AMBULATORY
DRESSING_REQUIRES_ASSISTANCE: 1
PHYSICAL_TRANSFER_REQUIRES_ASSISTANCE: 1
DRESSING_REQUIRES_ASSISTANCE: 1
MONEY MANAGEMENT (EXPENSES/BILLS): NEEDS ASSISTANCE
BATHING_REQUIRES_ASSISTANCE: 1
TOILETING: REQUIRES ASSIST
GROOMING_REQUIRES_ASSISTANCE: 1
AMBULATION ASSISTANCE: NON-AMBULATORY
AMBULATION_REQUIRES_ASSISTANCE: 1
BATHING_REQUIRES_ASSISTANCE: 1
LAUNDRY_REQUIRES_ASSISTANCE: 1
TOILETING: REQUIRES ASSIST
AMBULATION_REQUIRES_ASSISTANCE: 1
AMBULATION ASSISTANCE: NON-AMBULATORY
CONTINENCE_REQUIRES_ASSISTANCE: 1

## 2024-01-01 ASSESSMENT — PATIENT HEALTH QUESTIONNAIRE - PHQ9
2. FEELING DOWN, DEPRESSED, IRRITABLE, OR HOPELESS: NOT AT ALL
2. FEELING DOWN, DEPRESSED, IRRITABLE, OR HOPELESS: NOT AT ALL
1. LITTLE INTEREST OR PLEASURE IN DOING THINGS: NOT AT ALL
CLINICAL INTERPRETATION OF PHQ2 SCORE: 0
1. LITTLE INTEREST OR PLEASURE IN DOING THINGS: NOT AT ALL
SUM OF ALL RESPONSES TO PHQ9 QUESTIONS 1 AND 2: 0
SUM OF ALL RESPONSES TO PHQ9 QUESTIONS 1 AND 2: 0

## 2024-01-01 ASSESSMENT — FIBROSIS 4 INDEX
FIB4 SCORE: 0.66
FIB4 SCORE: 0.66
FIB4 SCORE: 0.78
FIB4 SCORE: 0.64

## 2024-01-02 PROBLEM — E86.0 DEHYDRATION: Status: ACTIVE | Noted: 2024-01-01

## 2024-01-02 NOTE — ED PROVIDER NOTES
ER Provider Note    Scribed for Siri Calvo M.d. by Doris Mc. 1/2/2024  12:28 PM    Primary Care Provider: Tamar Hoff M.D.    CHIEF COMPLAINT  Chief Complaint   Patient presents with    Nausea     Nausea x1mo     EXTERNAL RECORDS REVIEWED  Outpatient Notes shows that the patient has a history of uterine cancer, and follows with Dr. Dalal. She is supposed to have a diagnostic laparoscopy on 1/4/23. She was admitted here from 12/7-12/11/23 for peritoneal carcinomatosis presenting as nausea and vomiting. CT scan showed ascites and carcinomatosis. Gyn/Onc was consulted at that time. Echocardiogram done at the time of hospitalization, and EF was 55-60%.         HPI/ROS  LIMITATION TO HISTORY   Select: : None  OUTSIDE HISTORIAN(S):  EMS , who was able to help contribute to the patient's history    Olive Alexandra is a 83 y.o. female who presents to the ED complaining of nausea and vomiting for the past month. Patient has a history of uterine cancer, and follows up with Dr. Dalal (Gynecology/Oncology). She is scheduled to have a diagnostic laparoscopy in two days. She states that for the past few weeks, she has been unable to keep anything down, neither fluids nor solids. She reports her vomit is dark in color. She followed up with Dr. Dalal about her symptoms, who directed the patient here to the ED for further evaluation. Patient has associated dark stools and decreased urine output. Denies any fevers, chills, cough, runny nose, congestion, sore throat, chest pain, shortness of breath, dysuria, abdominal pain, or worsening leg swelling. Patient is on Lasix. No alleviating or exacerbating factors reported. No known drug allergies.     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 shoulder arthroscopy 8/2017    OTHER      right shoulder arthroscopy 8/2017       FAMILY HISTORY  Family History   Problem Relation Age of Onset    Arthritis Mother     No Known Problems Father     No Known Problems Sister     No Known Problems Brother     Arthritis Maternal Aunt     Arthritis Maternal Grandmother     Arthritis Maternal Grandfather     Lung Disease Maternal Grandfather         coal [employer]    Heart Disease Paternal Grandmother         MI age 70    No Known Problems Brother        SOCIAL HISTORY   reports that she has never smoked. She has never used smokeless tobacco. She reports that she does not currently use alcohol. She reports that she does not use drugs.    CURRENT MEDICATIONS  Previous Medications    ACETAMINOPHEN (TYLENOL) 500 MG TAB    Take 500 mg by mouth 1 time a day as needed for Mild Pain.    AMLODIPINE (NORVASC) 5 MG TAB    Take 5 mg by mouth every day.    ASCORBIC ACID (VITAMIN C) 1000 MG TABLET    Take 1,000 mg by mouth every day.    AZELASTINE (ASTELIN) 137 MCG/SPRAY SOLUTION    Administer 1 Spray into each nostril every day.    CARBOXYMETHYLCELLULOSE (REFRESH TEARS) 0.5 % SOLUTION    Administer 3-4 Drops into both eyes every morning.    CITALOPRAM (CELEXA) 40 MG TAB    Take 1 Tablet by mouth  "every day.    DIPHENHYDRAMINE (BENADRYL) 25 MG CAPSULE    Take 1 Capsule by mouth every 6 hours as needed (nausea).    FERROUS SULFATE 325 (65 FE) MG TABLET    Take 325 mg by mouth 2 times a day with meals.    FUROSEMIDE (LASIX) 40 MG TAB    Take 1 Tablet by mouth 2 times a day as needed (LE edema).    HYDROCODONE-ACETAMINOPHEN (NORCO) 7.5-325 MG TAB    Take 1 Tablet by mouth every 8 hours as needed for Moderate Pain or Severe Pain for up to 30 days.    HYDROCODONE-ACETAMINOPHEN (NORCO) 7.5-325 MG TAB    Take 1 Tablet by mouth every 8 hours as needed for Moderate Pain or Severe Pain for up to 30 days.    HYDROCODONE-ACETAMINOPHEN (NORCO) 7.5-325 MG TAB    Take 1 Tablet by mouth every 8 hours as needed for Moderate Pain or Severe Pain for up to 30 days.    LOSARTAN (COZAAR) 100 MG TAB    Take 1 Tablet by mouth every day.    MULTIPLE VITAMINS-MINERALS (PRESERVISION AREDS 2) CAP    Take 1 Capsule by mouth 2 times a day.    OMEPRAZOLE (PRILOSEC OTC) 20 MG TABLET    Take 20 mg by mouth every day.    POLYETHYLENE GLYCOL 400 (BLINK TEARS) 0.25 % SOLUTION    Administer 1 Drop into both eyes every evening.    POTASSIUM CHLORIDE SA (K-DUR) 10 MEQ TAB CR    Take 10 mEq by mouth every evening.    PRAVASTATIN (PRAVACHOL) 20 MG TAB    Take 1 Tablet by mouth at bedtime.    SCOPOLAMINE (TRANSDERM-SCOP) 1 MG/72HR PATCH 72 HR    Place 1 Patch on the skin every 72 hours.    URSODIOL (ACTIGALL) 300 MG CAP    Take 300 mg by mouth 2 times a day with meals.    VITAMIN D-VITAMIN K (VITAMIN K2-VITAMIN D3 PO)    Take 1 Tablet by mouth every afternoon on Monday through Friday. 1 tablet = 5,000 units vitamin D3       ALLERGIES  Shellfish allergy, Iodine, Shrimp (diagnostic), Gramineae pollens, and Penicillins    PHYSICAL EXAM  /54   Pulse 87   Temp 37.2 °C (98.9 °F) (Temporal)   Resp 20   Ht 1.702 m (5' 7\")   Wt 113 kg (250 lb)   SpO2 96%   BMI 39.16 kg/m²   Constitutional: Well developed, well nourished; No acute distress; " Non-toxic appearance.   HENT: Normocephalic, atraumatic; Bilateral external ears normal; Dry mucous membranes;   Eyes: PERRL, EOMI, Conjunctiva normal. No discharge.   Neck:  Supple, nontender midline; No stridor; No nuchal rigidity.   Lymphatic: No cervical lymphadenopathy noted.   Cardiovascular: Regular rate and rhythm without murmurs, rubs, or gallop.   Thorax & Lungs: Decreased breath sounds throughout, No wheezing, rales or rhonchi. Nontender chest wall. No crepitus or subcutaneous air  Abdomen: Distended abdomen with a large ventral hernia, nontender to palpation, decreased bowel sounds. No obvious masses; No pulsatile masses; no rebound, guarding, or peritoneal signs.   Skin: Pale in color; warm and dry without rash or petechia.  Back: Nontender, No CVA tenderness.   Rectal: Stool is brown and heme negative.   Extremities: Distal radial, dorsalis pedis, posterior tibial pulses are equal bilaterally; Trace pretibial edema with right greater than left; Nontender calves or saphenous, No cyanosis, No clubbing.   Musculoskeletal: Good range of motion in all major joints. No major deformities noted.   Neurologic: Alert & oriented x 4, clear speech      DIAGNOSTIC STUDIES    Labs:   Results for orders placed or performed during the hospital encounter of 01/02/24   CBC WITH DIFFERENTIAL   Result Value Ref Range    WBC 7.3 4.8 - 10.8 K/uL    RBC 3.68 (L) 4.20 - 5.40 M/uL    Hemoglobin 10.4 (L) 12.0 - 16.0 g/dL    Hematocrit 31.8 (L) 37.0 - 47.0 %    MCV 86.4 81.4 - 97.8 fL    MCH 28.3 27.0 - 33.0 pg    MCHC 32.7 32.2 - 35.5 g/dL    RDW 54.3 (H) 35.9 - 50.0 fL    Platelet Count 479 (H) 164 - 446 K/uL    MPV 9.6 9.0 - 12.9 fL    Neutrophils-Polys 80.80 (H) 44.00 - 72.00 %    Lymphocytes 7.10 (L) 22.00 - 41.00 %    Monocytes 10.40 0.00 - 13.40 %    Eosinophils 1.00 0.00 - 6.90 %    Basophils 0.30 0.00 - 1.80 %    Immature Granulocytes 0.40 0.00 - 0.90 %    Nucleated RBC 0.00 0.00 - 0.20 /100 WBC    Neutrophils  (Absolute) 5.90 1.82 - 7.42 K/uL    Lymphs (Absolute) 0.52 (L) 1.00 - 4.80 K/uL    Monos (Absolute) 0.76 0.00 - 0.85 K/uL    Eos (Absolute) 0.07 0.00 - 0.51 K/uL    Baso (Absolute) 0.02 0.00 - 0.12 K/uL    Immature Granulocytes (abs) 0.03 0.00 - 0.11 K/uL    NRBC (Absolute) 0.00 K/uL   COMP METABOLIC PANEL   Result Value Ref Range    Sodium 135 135 - 145 mmol/L    Potassium 3.7 3.6 - 5.5 mmol/L    Chloride 92 (L) 96 - 112 mmol/L    Co2 27 20 - 33 mmol/L    Anion Gap 16.0 7.0 - 16.0    Glucose 115 (H) 65 - 99 mg/dL    Bun 36 (H) 8 - 22 mg/dL    Creatinine 1.18 0.50 - 1.40 mg/dL    Calcium 9.2 8.5 - 10.5 mg/dL    Correct Calcium 10.0 8.5 - 10.5 mg/dL    AST(SGOT) 12 12 - 45 U/L    ALT(SGPT) 7 2 - 50 U/L    Alkaline Phosphatase 134 (H) 30 - 99 U/L    Total Bilirubin 0.6 0.1 - 1.5 mg/dL    Albumin 3.0 (L) 3.2 - 4.9 g/dL    Total Protein 6.8 6.0 - 8.2 g/dL    Globulin 3.8 (H) 1.9 - 3.5 g/dL    A-G Ratio 0.8 g/dL   LIPASE   Result Value Ref Range    Lipase 9 (L) 11 - 82 U/L   Lactic Acid   Result Value Ref Range    Lactic Acid 1.1 0.5 - 2.0 mmol/L   Lactic Acid   Result Value Ref Range    Lactic Acid 0.7 0.5 - 2.0 mmol/L   Magnesium   Result Value Ref Range    Magnesium 2.0 1.5 - 2.5 mg/dL   ESTIMATED GFR   Result Value Ref Range    GFR (CKD-EPI) 46 (A) >60 mL/min/1.73 m 2   TROPONIN   Result Value Ref Range    Troponin T 27 (H) 6 - 19 ng/L   EKG (NOW)   Result Value Ref Range    Report       [facility] Emergency Dept.    Test Date:  2024  Pt Name:    JENNI NIXON                 Department: ER  MRN:        [id number]                      Room:       [facility]  Gender:     Female                       Technician: [id number]  :        [id number]                   Requested By:ALAINA GROVE  Order #:    [id number]                    Reading MD:    Measurements  Intervals                                Axis  Rate:       81                           P:          -86  WI:         183                          QRS:         69  QRSD:       144                          T:          20  QT:         593  QTc:        689    Interpretive Statements  Sinus or ectopic atrial rhythm  Sinus pause  Probable left atrial enlargement  Right bundle branch block  Baseline wander in lead(s) V4,V5  Compared to ECG 12/09/2023 08:03:07  Ectopic atrial rhythm now present  Sinus pause or arrest now present  Sinus rhythm no longer present          EKG:   I have independently interpreted this EKG above     Radiology:   The attending emergency physician has independently interpreted the diagnostic imaging associated with this visit and am waiting the final reading from the radiologist.     Preliminary interpretation is a follows: ER MD is reviewed the patient's chest x-ray.  There appears to be hiatal hernia.  Otherwise, no infiltrates.    Radiologist interpretation:   CT-ABDOMEN-PELVIS WITH   Final Result      1.  There is no evidence of small bowel obstruction. Small bowel loops are again noted to be grouped in the right mid abdomen with possible areas of serosal metastasis.   2.  Very slight decrease in the amount of moderate loculated ascites with peritoneal nodularity and enhancement consistent with peritoneal carcinomatosis.   3.  No significant change in the moderate to large sized hiatal hernia with fluid distention of the distal esophagus. Question of mild gastric wall thickening which could represent inflammation.   4.  Stable trace of dependent left pleural effusion and left lower lobe atelectasis.      DX-CHEST-PORTABLE (1 VIEW)   Final Result      1.  No acute cardiopulmonary disease.   2.  Hiatal hernia.           COURSE & MEDICAL DECISION MAKING     ED Observation Status? No; Patient does not meet criteria for ED Observation.     INITIAL ASSESSMENT, COURSE AND PLAN  Care Narrative: Patient presents to the ER with complaint of nausea and vomiting for the last 1 month.  She says she cannot keep down any foods or fluid.  Patient is currently  being worked up by Dr. Dalal for ascites and peritoneal carcinomatosis.  There is concern that this might be recurrence of endometrial cancer which she had 5 years ago.  There is also concern this might be secondary to her liver.  Dr. Dalal was planning on doing a diagnostic laparoscopy on Thursday, January 4.  Patient has been getting very weak as a result of her nausea and vomiting.  She had decreased urine output.  She appears quite dehydrated clinically.  BUN/creatinine ratio is also elevated suggesting some prerenal dehydration.  Patient was given IV fluids here in the ER.  She has no complaints of abdominal pain.  Abdomen is distended.  CT scan was performed and reveals a very slight decrease in the amount of the moderate loculated ascites with peritoneal nodularity and enhancement still consistent with peritoneal carcinomatosis.  She has some small bowel loops again noted to be grouped in the right mid abdomen with possible areas of serosal metastasis.  Overall there is no significant change in the moderate to large sized hiatal hernia with fluid distention of the distal esophagus.  There is no evidence of obstruction or perforation.  Patient has no complaints of abdominal pain.  She is not tender.  At this time I do not suspect SBP.  Do not think she needs an emergent paracentesis.  She does not appear to be septic or toxic.  She is afebrile.  She is not in any significant distress here in the ER.  I spoke with Dr. Dalal.  He will kindly hospitalize the patient on his service for hydration pending the diagnostic laparoscopy day after tomorrow.    12:28 PM - Patient was reevaluated at bedside. Attempted to obtain history and examine the patient, however, patient would not answer any questions until she was cleaned up after having a bowel movement in her room.     12:45 PM -  Patient was seen and evaluated at bedside. Patient presents to the ED for nausea and vomiting for the past month. After my exam, I discussed  with the patient the plan of care, which includes treating the patient with medication for their symptoms, as well as obtaining lab work and imaging for further evaluation. We will also consult with Dr. Dalal. Patient understands and verbalizes agreement to plan of care.     5:19 PM - Paged Dr. Dalal (Gyn/Onc)    5:21 PM - I discussed the patient's case and the above findings with Dr. Dalal (Gyn/Onc) who will admit the patient for hydration.      5:45 PM - Patient was reevaluated at bedside. Discussed lab and radiology results with the patient and informed them of my consultation with Dr. Dalal and the plan for admission. Patient understands and verbalizes agreement to plan of care.       HYDRATION: Based on the patient's presentation of Acute Diarrhea and Acute Vomiting the patient was given IV fluids. IV Hydration was used because oral hydration was not adequate alone. Upon recheck following hydration, the patient was improved.      ADDITIONAL PROBLEM LIST  Problem #1: Nausea and vomiting x 1 month  Problem #2: Generalized weakness    DISPOSITION AND DISCUSSIONS  I have discussed management of the patient with the following physicians and DIMAS's:  Dr. Dalal (Gyn/Onc)    Discussion of management with other QHP or appropriate source(s): None     Escalation of care considered, and ultimately not performed: diagnostic imaging.  Patient has no complaints of chest pain or shortness of breath.  No worsening leg swelling.  Low suspicion for PE.  No need for CT scan of the chest.    Barriers to care at this time, including but not limited to:  None .     Decision tools and prescription drugs considered including, but not limited to: Pain Medications patient denies pain.  No need for pain medications. .    Patient will be hospitalized by Dr. Dalal (Gyn/Onc) in guarded condition.     FINAL DIAGNOSIS  1. Nausea and vomiting, unspecified vomiting type Acute   2. Dehydration Acute     This dictation has been created using voice recognition  software. The accuracy of the dictation is limited by the abilities of the software. I expect there may be some errors of grammar and possibly content. I made every attempt to manually correct the errors within my dictation. However, errors related to voice recognition software may still exist and should be interpreted within the appropriate context.      Doris DARNELL (Scribmamadou), am scribing for, and in the presence of, Siri Calvo M.D..    Electronically signed by: Doris Mc (Dominic), 1/2/2024    Siri DARNELL M.D. personally performed the services described in this documentation, as scribed by Doris Mc in my presence, and it is both accurate and complete.      The note accurately reflects work and decisions made by me.  Siri Calvo M.D.  1/2/2024  10:33 PM

## 2024-01-02 NOTE — ED TRIAGE NOTES
84 y/o female BIB EMS from home for nausea/vomiting [id number]. Pt has hx of uterine cancer. Pt was told by her oncologist to come to the ED for evaluation. Pt has scheduled procedure this Thursday. Pt is Aox4, following commands. . Pt reports vomiting 1-2 times a day, describes emesis as brown in color, denies blood thinners.

## 2024-01-02 NOTE — ED NOTES
Pt refused to speak with ERP until changed out of her soiled diaper. Diaper changed. Pt able to assist with turns.

## 2024-01-03 NOTE — H&P
Gynecologic Oncology H&P    Date: 1/3/2024    Admitting Physician: Dakota Scales P.A.-C. , Brian Dalal MD    CC: Nausea with vomiting, hx of endometrial cancer    HPI:   Mrs. Alexandra is a pleasant 83 year old  white female whose LMP was in her early 50's. She has a past medical history significant for HTN, hyperlipidemia, anemia (iron deficiency), elevated BMI, hepatitis A, fatty liver, arthritis with secondary chronic knee pain and shoulder pain, and right rotator cuff injury. She has an extensive past surgical history including an umbilical hernia repair, tonsillectomy, and surgical fixation for a broken right ankle. She was in her usual state of health until she noticed postmenopausal bleeding. She subsequently underwent a robotic hysterectomy, BSO, RP mass excision, appendectomy, cytoreduction, enterolysis, and omental resection on 19. 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 She had this surgery in [place]. She is s/p 5040 cGy IMRT with Dr. Crews in 2019, and was followed for surveillance every six months by Dr. Ash Kuo. She does not believe that she had a  measured before or during her treatment and is unsure when her last imaging was. She is not on HRT and denies prior use of HRT.     She moved to [place] from [place] to be closer to her daughter who works at [employer] and subsequently was referred to our office in 2021 for further monitoring of her previous endometrial cancer. She is unable to walk since 3/13/19 and currently uses a scooter for mobilization. Has a caregiver that helps her with ADLs twice daily.     She was seen for consultation 1/10/22. Again, she is not on any HRT. Overall she was doing well, asymptomatic from  a  gynecologic standpoint. Unable to obtain a weight today due to not being able to stand. Her last pelvic examination was in June of 2021 with Dr. Kuo's office. Endorses ongoing urinary incontinence. She denies any other significant symptoms.  She continued with cancer surveillance undergoing routine CT scans and follow up appointment and was 4yr 11months since her initial surgery.     She subsequently presented to [facility] ED on 12/7/23 c/o uncontrolled nausea and vomiting. CT of abd/pelvis revealed new moderate to large amount of loculated ascites with peritoneal enhancement and nodularity consistent with peritoneal carcinomatosis. No bowel obstruction or acute inflammation although serosal metastasis cannot be excluded. Moderate to largesized hiatal hernia is unchanged. Trace of dependent left pleural effusion and bibasilar atelectasis.    On 12/13/23,  presents for treatment planning. She denies any episodes of nausea and vomiting since being discharged. She reports building up her appetite again. Se reports constipation. She is here with her daughter and  to discuss treatment planning. She denies any vaginal bleeding, discharge, pelvic pain, leg edema, and changes in gastrointestinal or genitourinary symptoms. She denies having any unexplained nausea, vomiting, diarrhea, or bloating. She states her diet has been good, denies having any loss of appetite or early satiety. She denies experiencing any shortness of breath or difficulty breathing. Due to the unclear etiology of her recurrent ascites, with multiple paracentesis showing no malignant cells she was counseled regarding diagnostic laparoscopy in efforts to make a definitive diagnosis. Differential discussed with patient includes recurrence, low grade appendiceal cancer. This procedure was planned for 1/4/24; however, the patient again developed decreased appetite, and intractable nausea and vomiting, prompting direct admission in  anticipation for her procedure on 1/4/24.    She was seen and evaluated this morning at bedside. She reports her nausea and vomiting resolved shortly after medications administered in the ED. She reports decreased appetite the last two days, but adds this is improving today as well. She denies SOB/CP, and adds that she uses supplemental O2, nightly at home. She reports her last BM was yesterday, described as small due to lack of PO intake, denies hematochezia, melena. She is voiding spontaneously, denies urinary symptoms. She adds that she continues to use powered scooter and transfer board for ambulation. She denies fever, chills.       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 shoulder arthroscopy 8/2017    OTHER      right shoulder arthroscopy 8/2017       Current Facility-Administered Medications   Medication Dose Route Frequency Provider Last Rate Last Admin    amLODIPine (Norvasc) tablet 5 mg  5 mg Oral DAILY Maura Osborne, A.P.R.N.   5 mg at 01/02/24 2145    citalopram (CeleXA) tablet 40 mg  40 mg Oral DAILY Maura Osborne A.P.R.N.   40 mg at  01/03/24 0607    losartan (Cozaar) tablet 100 mg  100 mg Oral Q EVENING Maura Osborne, A.P.R.N.   100 mg at 01/02/24 2145    HYDROcodone-acetaminophen (Norco) 5-325 MG per tablet 1.5 Tablet  1.5 Tablet Oral Q8HRS PRN Maura Osborne, A.P.R.N.        senna-docusate (Pericolace Or Senokot S) 8.6-50 MG per tablet 2 Tablet  2 Tablet Oral BID Maura Osborne, A.P.R.N.   2 Tablet at 01/03/24 0607    And    polyethylene glycol/lytes (Miralax) Packet 1 Packet  1 Packet Oral QDAY PRN Maura Osborne, A.P.R.N.        And    magnesium hydroxide (Milk Of Magnesia) suspension 30 mL  30 mL Oral QDAY PRN Maura Osborne, A.P.R.N.        And    bisacodyl (Dulcolax) suppository 10 mg  10 mg Rectal QDAY PRN Maura Osborne, A.P.R.N.        lactated ringers infusion   Intravenous Continuous Maura Osborne, A.P.R.N. 100 mL/hr at 01/02/24 1849 New Bag at 01/02/24 1849    prochlorperazine (Compazine) injection 10 mg  10 mg Intravenous Q6HRS PRN Maura Osborne, A.P.R.N.        [Held by provider] enoxaparin (Lovenox) inj 40 mg  40 mg Subcutaneous Q EVENING Maura Osborne, A.P.R.N.   40 mg at 01/02/24 2146    acetaminophen (Tylenol) tablet 650 mg  650 mg Oral Q6HRS PRN Maura Osborne, A.P.R.N.        morphine 4 MG/ML injection 2 mg  2 mg Intravenous Q2HRS PRN Maura Osborne, A.P.R.N.        omeprazole (PriLOSEC) capsule 20 mg  20 mg Oral DAILY Maura Osborne, A.P.R.N.   20 mg at 01/03/24 0607       Allergies:  Shellfish allergy, Iodine, Shrimp (diagnostic), Gramineae pollens, and Penicillins    Social History     Socioeconomic History    Marital status:      Spouse name: Not on file    Number of children: Not on file    Years of education: Not on file    Highest education level: Bachelor's degree (e.g., BA, AB, BS)   Occupational History    Not on file   Tobacco Use    Smoking status: Never    Smokeless tobacco: Never   Vaping Use    Vaping Use: Never used   Substance and Sexual Activity    Alcohol use: Not Currently     Comment: once a month, if at all.     Drug use: Never    Sexual activity: Not on file   Other Topics Concern    Not on file   Social History Narrative    Not on file     Social Determinants of Health     Financial Resource Strain: Low Risk  (5/23/2022)    Overall Financial Resource Strain (CARDIA)     Difficulty of Paying Living Expenses: Not hard at all   Food Insecurity: No Food Insecurity (5/23/2022)    Hunger Vital Sign     Worried About Running Out of Food in the Last Year: Never true     Ran Out of Food in the Last Year: Never true   Transportation Needs: No Transportation Needs (5/23/2022)    PRAPARE - Transportation     Lack of Transportation (Medical): No     Lack of Transportation (Non-Medical): No   Physical Activity: Sufficiently Active (5/23/2022)    Exercise Vital Sign     Days of Exercise per Week: 5 days     Minutes of Exercise per Session: 50 min   Stress: Not on file   Social Connections: Socially Integrated (5/23/2022)    Social Connection and Isolation Panel [NHANES]     Frequency of Communication with Friends and Family: More than three times a week     Frequency of Social Gatherings with Friends and Family: More than three times a week     Attends Cheondoism Services: More than 4 times per year     Active Member of Clubs or Organizations: Yes     Attends Club or Organization Meetings: More than 4 times per year     Marital Status:    Intimate Partner Violence: Not on file   Housing Stability: Low Risk  (5/23/2022)    Housing Stability Vital Sign     Unable to Pay for Housing in the Last Year: No     Number of Places Lived in the Last Year: 2     Unstable Housing in the Last Year: No       Family History   Problem Relation Age of Onset    Arthritis Mother     No Known Problems Father     No Known Problems Sister     No Known Problems Brother     Arthritis Maternal Aunt     Arthritis Maternal Grandmother     Arthritis Maternal Grandfather     Lung Disease Maternal Grandfather         coal mine    Heart Disease Paternal  "Grandmother         MI age 70    No Known Problems Brother          Physical Exam:  /50   Pulse 64   Temp (!) 35.7 °C (96.2 °F) (Temporal)   Resp 16   Ht 1.702 m (5' 7\")   Wt 113 kg (250 lb)   SpO2 99%         Physical Exam  Vitals and nursing note reviewed. Exam conducted with a chaperone present (Daughter and RN, Ynes at bedside).   Constitutional:       General: She is not in acute distress.     Appearance: Normal appearance. She is not toxic-appearing.   HENT:      Head: Normocephalic and atraumatic.      Right Ear: External ear normal.      Left Ear: External ear normal.      Nose: Nose normal.      Mouth/Throat:      Mouth: Mucous membranes are dry.      Pharynx: Oropharynx is clear.   Eyes:      Extraocular Movements: Extraocular movements intact.   Cardiovascular:      Rate and Rhythm: Normal rate.      Pulses: Normal pulses.   Pulmonary:      Effort: Pulmonary effort is normal.   Abdominal:      General: There is no distension.      Palpations: Abdomen is soft.      Tenderness: There is no abdominal tenderness. There is no guarding or rebound.      Hernia: A hernia (Large ventral hernia present (known per patient)) is present.   Musculoskeletal:      Cervical back: Normal range of motion.   Skin:     General: Skin is warm and dry.      Coloration: Skin is not jaundiced.      Findings: No rash.   Neurological:      General: No focal deficit present.      Mental Status: She is alert and oriented to person, place, and time.   Psychiatric:         Mood and Affect: Mood normal.         Behavior: Behavior normal.         Judgment: Judgment normal.          Labs:  Recent Labs     01/02/24  1310 01/03/24  0437   WBC 7.3 6.5   RBC 3.68* 3.83*   HEMOGLOBIN 10.4* 10.7*   HEMATOCRIT 31.8* 34.3*   MCV 86.4 89.6   MCH 28.3 27.9   MCHC 32.7 31.2*   RDW 54.3* 57.2*   PLATELETCT 479* 370   MPV 9.6 9.5     Recent Labs     01/02/24  1310 01/03/24  0437   SODIUM 135 133*   POTASSIUM 3.7 4.3   CHLORIDE 92* 93* "   CO2 27 23   GLUCOSE 115* 151*   BUN 36* 34*   CREATININE 1.18 1.01   CALCIUM 9.2 8.9     Recent Labs     01/03/24  0437   APTT 32.4   INR 1.15*     Recent Labs     01/02/24  1310 01/03/24  0437   ASTSGOT 12 10*   ALTSGPT 7 8   TBILIRUBIN 0.6 0.4   ALKPHOSPHAT 134* 126*   GLOBULIN 3.8* 3.5   INR  --  1.15*       Radiology:  CT AP (1/2/24):  IMPRESSION:     1.  There is no evidence of small bowel obstruction. Small bowel loops are again noted to be grouped in the right mid abdomen with possible areas of serosal metastasis.  2.  Very slight decrease in the amount of moderate loculated ascites with peritoneal nodularity and enhancement consistent with peritoneal carcinomatosis.  3.  No significant change in the moderate to large sized hiatal hernia with fluid distention of the distal esophagus. Question of mild gastric wall thickening which could represent inflammation.  4.  Stable trace of dependent left pleural effusion and left lower lobe atelectasis.      Assessment and plan:   Ms Barron is a very pleasant 82 yo with a history of endometrial cancer. She was 4y 11 months since her last treatment when she began developing recurrent ascites of unknown etiology, and thus was planned to undergo diagnostic laparoscopy on 1/4/24 with Dr Dalal. She subsequently developed intractable nausea with emesis and found to have signs of dehydration, and thus was admitted prior to this procedure.     Intractable Nausea with emesis: Resolved following antiemetics in the ED. Continue antiemetics PRN. After discussion with pharmD, Mnose, will hold zofran at this time, due to prolonged QTc. CLD w/o reds today in anticipation for surgery tomorrow  Dehydration: likely due to poor PO intake and emesis. Cr and Bun improving with IVF. Continue IVF, encouraged patient to push PO intake as well now that N/V improved. Monitor lytes and replace PRN.   Endometrial Cancer: follows with Dr Dalal, 4y 11m since last treatment. Recent CT  scans with  recurrent ascites of unknown etiology, plan for diagnostic laparoscopy with Dr Dalal 1/4/24. Preop testing complete plan for CLD, no reds today, bowel prep and NPO at midnight.   Hx of HTN: continue home meds  Hx of HLD: continue home meds.     Dispo: plan for OR tomorrow with QUEENIE Miles MD  Gynecologic Oncology  [facility]

## 2024-01-03 NOTE — THERAPY
Occupational Therapy   Initial Evaluation     Patient Name: Olive Alexandra  Age:  83 y.o., Sex:  female  Medical Record #: [id number]  Today's Date: 1/3/2024          Assessment  Patient is 83 y.o. female with a diagnosis of nausea x1 month.      No OT needs at this time     01/03/24 0754   Prior Living Situation   Prior Services Other (Comments)  (states that she has a care giver who assist with all ADLs.)   Housing / Facility 3 Story Apartment / Condo   Steps Into Home 0   Equipment Owned Scooter   Lives with - Patient's Self Care Capacity Spouse;Adult Children   Prior Level of ADL Function   Self Feeding Independent   Grooming / Hygiene Independent   Bathing Requires Assist   Dressing Requires Assist   Toileting Requires Assist   ADL Assessment   Comments pt states that she has a care giver that assist with ADLs and mobility   Interdisciplinary Plan of Care Collaboration   Collaboration Comments Pt states that has a care giver that assists with ADLs and mobility.  Pt also states that she is at her baseline, and doesn't need therapy

## 2024-01-03 NOTE — THERAPY
Physical Therapy Contact Note    Patient Name: Olive Alexandra  Age:  83 y.o., Sex:  female  Medical Record #: [id number]  Today's Date: 1/3/2024    PT consult received, chart reviewed; pt has been non ambulatory x 9 years, utilizing slideboard when needed via caregiver or  in independent living facility to power w/c; reported to OT she is at her baseline and does not want therapy; here for a diagnostic lap; will defer acute PT evaluation per pt's wishes, will monitor for need/role of acute PT with length of stay/change in status;     Zuleika ABERNATHY, PT,  [phone]

## 2024-01-03 NOTE — DIETARY
"Nutrition services: Day 1 of admit.  Olive Alexandra is a 83 y.o. female with admitting DX of Dehydration     Consult received for unintentional weight loss and poor PO intake; MST 3    Attempted to meet with pt at bedside. Pt was occupied with staff at time of visit. RD to follow up in upcoming days.     Assessment:  Height: 170.2 cm (5' 7\")  Weight: 113 kg (250 lb) via stand up scale   Body mass index is 39.16 kg/m²., BMI classification: Obesity class II   Diet/Intake: Clear liquids-no red; no meals recorded     Wt Readings from Last 7 Encounters:   01/02/24 113 kg (250 lb)   12/07/23 113 kg (250 lb)   11/27/23 118 kg (260 lb)   08/31/23 118 kg (260 lb)   09/22/22 125 kg (275 lb)   09/04/22 (!) 138 kg (304 lb 3.8 oz)   04/19/22 125 kg (275 lb)     Evaluation:   PMH: pt with c/o nausea and vomiting in the past month. H/o Uterine cancer followed by Dr. Dalal.   Labs: Na 133, Bun 34, GFR 55, AST 10, Alb 2.8  Meds: omeprazole, senna, zofran, Kcl  +BM: 1/2  Nutrition focused physical exam: Unable to conduct at this time     Malnutrition Risk: Unable to determine     Recommendations/Plan:  RD to follow up to complete malnutrition assessment    Encourage intake of >50%  Document intake of all as % taken in ADL's to provide interdisciplinary communication across all shifts.   Monitor weight.  Nutrition rep will continue to see patient for ongoing meal and snack preferences.       RD following    "

## 2024-01-03 NOTE — DISCHARGE PLANNING
Care Transition Team Assessment    Completed chart review, pt admitted on 12/7/23-12/11/23 for peritoneal carcinomatosis. Face sheet information verified. Patient states she lives at an independent living facility ([facility]) here in [place] with her . They live on the 4th floor but has elevator access. Patient uses a scooter at home, 0.5LO2 NC, supplied by [employer]. Pt stated prior to this admission she was independent with her ADLS/IADLS, however; she does have a care-giver that helps bedtime routine. (Care giver is from [facility]). Pharmacy is [facility]. Pt has uterine cancer and is followed by Dr. Brian Dalal. Pt scheduled for laparoscopy on Thursday with Dr. Dalal.      Information Source  Orientation Level: Oriented X4  Information Given By: Patient  Who is responsible for making decisions for patient? : Patient    Readmission Evaluation  Is this a readmission?: Yes - unplanned readmission    Elopement Risk  Legal Hold: No  Ambulatory or Self Mobile in Wheelchair: Yes  Disoriented: No  Psychiatric Symptoms: None  History of Wandering: No  Elopement this Admit: No  Vocalizing Wanting to Leave: No  Displays Behaviors, Body Language Wanting to Leave: No-Not at Risk for Elopement    Interdisciplinary Discharge Planning  Lives with - Patient's Self Care Capacity: Spouse, Adult Children  Patient or legal guardian wants to designate a caregiver: No  Support Systems: Family Member(s)  Housing / Facility: 3 Story Apartment / Condo  Name of Care Facility: [facility]  Durable Medical Equipment: Walker, Home Oxygen (electrical scooter)  DME Provider / Phone: [facility]    Discharge Preparedness  What is your plan after discharge?: Home with help  What are your discharge supports?: Spouse  Prior Functional Level: Ambulatory  Difficulity with ADLs: None  Difficulity with IADLs: None    Functional Assesment  Prior Functional Level: Ambulatory    Finances  Financial Barriers to Discharge:  No  Prescription Coverage: Yes    Vision / Hearing Impairment  Vision Impairment : Yes  Right Eye Vision: Impaired, Wears Glasses  Left Eye Vision: Impaired, Wears Glasses  Hearing Impairment : Yes  Hearing Impairment: Both Ears         Advance Directive  Advance Directive?: None    Domestic Abuse  Have you ever been the victim of abuse or violence?: No  Physical Abuse or Sexual Abuse: No  Verbal Abuse or Emotional Abuse: No  Possible Abuse/Neglect Reported to:: Not Applicable    Psychological Assessment  History of Substance Abuse: None  History of Psychiatric Problems: No  Non-compliant with Treatment: No  Newly Diagnosed Illness: No    Discharge Risks or Barriers  Discharge risks or barriers?: Complex medical needs  Patient risk factors: Complex medical needs    Anticipated Discharge Information  Discharge Disposition: Discharged to home/self care (01)

## 2024-01-04 PROBLEM — R57.9 SHOCK (HCC): Status: ACTIVE | Noted: 2024-01-01

## 2024-01-04 NOTE — CODE DOCUMENTATION
Pt hypotensive in the 60's to 80's systolic. IV infiltrated on LUE. Unable to place US IV by RRT RN's. Dr Ryan notified pt may need line. Pt given midodrine PO.  Pt to be transported to  91 by 3 RRT RN's.   
abdomen

## 2024-01-04 NOTE — PROCEDURES
Central Line Insertion    Date/Time: 1/4/2024 4:16 AM    Performed by: Yandel Ryan M.D.  Authorized by: Yandel Ryan M.D.    Consent:     Consent obtained:  Verbal    Consent given by:  Patient    Risks discussed:  Incorrect placement and infection    Alternatives discussed:  No treatment  Pre-procedure details:     Hand hygiene: Hand hygiene performed prior to insertion      Sterile barrier technique: All elements of maximal sterile technique followed      Skin preparation:  ChloraPrep    Skin preparation agent: Skin preparation agent completely dried prior to procedure    Sedation:     Sedation type:  None  Anesthesia:     Anesthesia method:  Local infiltration    Local anesthetic:  Lidocaine 1% w/o epi  Procedure details:     Location:  R femoral    Site selection rationale:  See separate progress note from 1/4    Patient position: HOB @ ~20 degrees.    Catheter size:  7 Fr (20cm)    Landmarks identified: yes      Ultrasound guidance: yes      Sterile ultrasound techniques: Sterile gel and sterile probe covers were used      Number of attempts:  1    Successful placement: yes    Post-procedure details:     Post-procedure:  Dressing applied and line sutured    Guidewire: guidewire removal confirmed      Assessment:  Blood return through all ports and free fluid flow    Patient tolerance of procedure:  Tolerated well, no immediate complications  Comments:      Guidewire is out and has been accounted for at the end of the procedure.  The line is OK to use.

## 2024-01-04 NOTE — ASSESSMENT & PLAN NOTE
Followed by GI  Anti-mitchondria Ab positive  Possible liver biopsy when she gets her diagnostic laparoscopy with gynecology oncology

## 2024-01-04 NOTE — ASSESSMENT & PLAN NOTE
Hb to 7.9 today, no overt bleeding, recheck 8.6  Will send occult blood sample, possible occult GIB but hemodynamically stable    Conservative transfusion strategies

## 2024-01-04 NOTE — PROGRESS NOTES
"Patient blood pressure was 89/36, notify Dr. Dalal at 8 pm. Dr dalal ordered a 500 ml bolus of LR.  After bolus patient blood pressure was 84/32, patient was asystematic. Per patient \" I feel fine, my stomach just feels upset from the bowel prep\". Notify Dr. Dalal, ordered an EKG, stop bowel prep and 1,000ml of LR bolus. Patient blood pressure was still low 76/21, Dr. Dalal was notify. Order to transfer patient to ICU for Levophed. Patient was transfer out the unit around 0030.  "

## 2024-01-04 NOTE — PROGRESS NOTES
Pt arrival to T 913 brought via hospital bed on the zoll and tele monitor. Transported by RRT nurses. ransferred to ICU bed via waffle mattress. Pt attached to in room monitor.     Arrival time: 0040  Temp: 97.9 f via temporal thermometer  Weight: TBD. US IV attempt in place.  Skin assessment to be completed after IV    Pt's belongings include:    - phone  -     Belongings placed in bedside table    Pt provided with call light. Bed in lowest, locked position. Bed alarm on.

## 2024-01-04 NOTE — PROGRESS NOTES
Critical Care Progress Note    Date of admission  1/2/2024    Chief Complaint  83 y.o. female with a history of uterine cancer (s/p surgery and radiation), HTN, HLD, iron deficiency anemia, hep C, NAFLD, primary biliary cholangitis, arthritis and multiple prior abdominal surgeries including appendectomy, umbilical hernia repair, robotic hysterectomy, bilateral salpingo-oophorectomy, retroperitoneal mass excision, omental resection.  She was admitted earlier today by gynecology oncology due to weakness, recurrent ascites and plan for elective diagnostic laparoscopy with potential liver biopsy.     Her admission earlier today was remarkable for initial blood pressure in the 110s/50s but has since dwindled to the 80s/30s.  No mentation differences and no changes in her symptoms from baseline.  She did not respond to fluid boluses earlier and now has no IV access for further options.  She did get Norvasc and potentially Cozaar earlier today.     No fevers or chills, rigors, or other infectious signs or symptoms.  But given steadily decreasing blood pressure will transfer to ICU for vasopressors along with infectious workup.    Hospital Course  No notes on file    Interval Problem Update  Reviewed last 24 hour events:  Transferred to ICU for pressors    Neuro:     CV:  HR 70-80s  SBP   Midodrine 15 tid    Resp:   2L %  CXR Perihilar fullness, atx      I/O: +2.% L  UOP: not documented, sluggish per report  /hr    Tmax: AF  Heme: WBC 6.5    Abx:   CTX/flagyll    Micro:   NGTD    Endo: BG WTR    LDA: CVC R femoral  SUP: PPI  VTE: held  Diet: npo      Review of Systems  Review of Systems   All other systems reviewed and are negative.       Vital Signs for last 24 hours   Temp:  [36.5 °C (97.7 °F)-36.7 °C (98 °F)] 36.6 °C (97.8 °F)  Pulse:  [59-96] 72  Resp:  [11-49] 13  BP: ()/(21-64) 117/57  SpO2:  [67 %-100 %] 99 %    Hemodynamic parameters for last 24 hours       Respiratory Information for the  last 24 hours       Physical Exam   Physical Exam  Vitals and nursing note reviewed. Exam conducted with a chaperone present.   Constitutional:       General: She is not in acute distress.     Appearance: Normal appearance. She is not ill-appearing.   HENT:      Head: Normocephalic.      Mouth/Throat:      Mouth: Mucous membranes are moist.   Eyes:      Extraocular Movements: Extraocular movements intact.   Cardiovascular:      Rate and Rhythm: Normal rate and regular rhythm.      Pulses: Normal pulses.   Pulmonary:      Effort: Pulmonary effort is normal. No respiratory distress.   Abdominal:      General: There is no distension.      Palpations: Abdomen is soft.      Tenderness: There is no abdominal tenderness. There is no guarding or rebound.      Comments: Hernia present, soft, not incarcerated   Musculoskeletal:         General: Normal range of motion.      Cervical back: Normal range of motion and neck supple.   Skin:     General: Skin is warm and dry.      Capillary Refill: Capillary refill takes less than 2 seconds.   Neurological:      General: No focal deficit present.      Mental Status: She is alert and oriented to person, place, and time. Mental status is at baseline.         Medications  Current Facility-Administered Medications   Medication Dose Route Frequency Provider Last Rate Last Admin    cefTRIAXone (Rocephin) syringe 2,000 mg  2,000 mg Intravenous Q24HRS Yandel Ryan M.D.        metroNIDAZOLE (Flagyl) IVPB 500 mg  500 mg Intravenous Q12HRS Yandel Ryan M.D.   Held at 01/04/24 0130    thiamine (Vitamin B-1) tablet 100 mg  100 mg Oral DAILY Rinku Barajas M.D.   100 mg at 01/04/24 1048    albumin human 5% solution 25 g  25 g Intravenous Once Rniku Barajas M.D.        prochlorperazine (Compazine) tablet 10 mg  10 mg Oral Q6HRS PRN KIARRA ReynaASRINIVASAN   10 mg at 01/03/24 1548    midodrine (Proamatine) tablet 15 mg  15 mg Oral TID WITH MEALS Yandel Ryan M.D.   15 mg at 01/04/24  1233    norepinephrine (Levophed) 8 mg in 250 mL NS infusion (premix)  0-1 mcg/kg/min (Ideal) Intravenous Continuous Yandel Ryan M.D. 1.2 mL/hr at 01/04/24 1623 0.01 mcg/kg/min at 01/04/24 1623    [Held by provider] amLODIPine (Norvasc) tablet 5 mg  5 mg Oral DAILY Maura Osborne, A.P.R.N.   5 mg at 01/03/24 1735    citalopram (CeleXA) tablet 40 mg  40 mg Oral DAILY Maurasisi Osborne, A.P.R.N.   40 mg at 01/03/24 0607    [Held by provider] losartan (Cozaar) tablet 100 mg  100 mg Oral Q EVENING Maurasisi Osborne, A.P.R.N.   100 mg at 01/02/24 2145    HYDROcodone-acetaminophen (Norco) 5-325 MG per tablet 1.5 Tablet  1.5 Tablet Oral Q8HRS PRN Maura Osborne, A.P.R.N.        senna-docusate (Pericolace Or Senokot S) 8.6-50 MG per tablet 2 Tablet  2 Tablet Oral BID Maura Osborne, A.P.R.N.   2 Tablet at 01/03/24 0607    And    polyethylene glycol/lytes (Miralax) Packet 1 Packet  1 Packet Oral QDAY PRN Maura Osborne, A.P.R.N.        And    magnesium hydroxide (Milk Of Magnesia) suspension 30 mL  30 mL Oral QDAY PRN Maura Osborne, A.P.R.N.        And    bisacodyl (Dulcolax) suppository 10 mg  10 mg Rectal QDAY PRN Maura Osborne, A.P.R.N.        lactated ringers infusion   Intravenous Continuous Maura Osborne, A.P.R.N. 100 mL/hr at 01/04/24 1623 Rate Verify at 01/04/24 1623    prochlorperazine (Compazine) injection 10 mg  10 mg Intravenous Q6HRS PRN Maura Osborne, A.P.R.N.   10 mg at 01/03/24 2257    [Held by provider] enoxaparin (Lovenox) inj 40 mg  40 mg Subcutaneous Q EVENING Maura TROY Osborne, A.P.R.N.   40 mg at 01/02/24 2146    acetaminophen (Tylenol) tablet 650 mg  650 mg Oral Q6HRS PRN Maura Osborne A.P.R.N.        morphine 4 MG/ML injection 2 mg  2 mg Intravenous Q2HRS PRN HONG Forbes.P.R.N.   2 mg at 01/04/24 1518    omeprazole (PriLOSEC) capsule 20 mg  20 mg Oral DAILY Maura Osborne A.P.R.N.   20 mg at 01/03/24 0607       Fluids    Intake/Output Summary (Last 24 hours) at 1/4/2024 1650  Last data filed at 1/4/2024 1623  Gross per 24  hour   Intake 2440.32 ml   Output 20 ml   Net 2420.32 ml       Laboratory          Recent Labs     01/02/24  1310 01/03/24  0437   SODIUM 135 133*   POTASSIUM 3.7 4.3   CHLORIDE 92* 93*   CO2 27 23   BUN 36* 34*   CREATININE 1.18 1.01   MAGNESIUM 2.0  --    CALCIUM 9.2 8.9     Recent Labs     01/02/24  1310 01/03/24  0437   ALTSGPT 7 8   ASTSGOT 12 10*   ALKPHOSPHAT 134* 126*   TBILIRUBIN 0.6 0.4   LIPASE 9*  --    GLUCOSE 115* 151*     Recent Labs     01/02/24  1310 01/03/24  0437   WBC 7.3 6.5   NEUTSPOLYS 80.80* 89.00*   LYMPHOCYTES 7.10* 7.40*   MONOCYTES 10.40 2.50   EOSINOPHILS 1.00 0.00   BASOPHILS 0.30 0.20   ASTSGOT 12 10*   ALTSGPT 7 8   ALKPHOSPHAT 134* 126*   TBILIRUBIN 0.6 0.4     Recent Labs     01/02/24  1310 01/03/24  0437   RBC 3.68* 3.83*   HEMOGLOBIN 10.4* 10.7*   HEMATOCRIT 31.8* 34.3*   PLATELETCT 479* 370   PROTHROMBTM  --  14.9*   APTT  --  32.4   INR  --  1.15*       Imaging  CT:    Reviewed    Assessment/Plan  * Shock (HCC)  Assessment & Plan  Recent echo normal-will repeat limited echo to assess LV function  Possibly hypovolemic and distributive given ascites of unclear origin, did not respond to fluid bolus  Wide PP and warm extremities argues for vasodilatory state, possible related to poor nutrition and cirrhosis    Cortisol and TSH WNL    No evidence of hepatorenal syndrome  Will give a trial bolus of albumin to support renal perfusion in this vasodilatory state    No real infectious signs or symptoms but given unclear etiology will  start on some empiric antibiotics.    UA somewhat concerning though without dysuria    Will check also a procalcitonin but have low threshold to discontinue antibiotics.  -Plan for ceftriaxone plus Flagyl, I have time them out for 3 days, will re-evaluate daily    Start midodrine 15 mg 3 times daily  Hold all home antihypertensives  Will require CVC due to complete lack of vascular access    Dehydration- (present on admission)  Assessment & Plan  Now fluid  resuscitated    Ascites- (present on admission)  Assessment & Plan  No malignant cells on recent paracentesis  Unlikely infected currently, may warrant diagnostic para if worsens or trends toward a sepsis syndrome  Empiric ceftriaxone plus Flagyl    Primary biliary cholangitis (HCC)- (present on admission)  Assessment & Plan  Followed by GI  Anti-mitchondria Ab positive  Possible liver biopsy when she gets her diagnostic laparoscopy with gynecology oncology    Hypertension- (present on admission)  Assessment & Plan  Hold all home antihypertensives    Microcytic anemia- (present on admission)  Assessment & Plan  Conservative transfusion strategies         VTE:  Contraindicated potential procedure today  Ulcer: PPI  Lines: Central Line  Ongoing indication addressed    I have performed a physical exam and reviewed and updated ROS and Plan today (1/4/2024). In review of yesterday's note (1/3/2024), there are no changes except as documented above.     Discussed patient condition and risk of morbidity and/or mortality with RN, RT, Therapies, Pharmacy, and Patient  The patient remains critically ill.  Critical care time = 45 minutes in directly providing and coordinating critical care and extensive data review.  No time overlap and excludes procedures.

## 2024-01-04 NOTE — CONSULTS
Critical Care History & Physical    Date of consult: 01/03/24    Referring Physician  Brian Dalal M.D.    Reason for Consultation  Chief Complaint   Patient presents with    Nausea     Nausea x1mo       History of Presenting Illness  83 y.o. female with a history of uterine cancer (s/p surgery and radiation), HTN, HLD, iron deficiency anemia, hep C, NAFLD, primary biliary cholangitis, arthritis and multiple prior abdominal surgeries including appendectomy, umbilical hernia repair, robotic hysterectomy, bilateral salpingo-oophorectomy, retroperitoneal mass excision, omental resection.  She was admitted earlier today by gynecology oncology due to weakness, recurrent ascites and plan for elective diagnostic laparoscopy with potential liver biopsy.    Her admission earlier today was remarkable for initial blood pressure in the 110s/50s but has since dwindled to the 80s/30s.  No mentation differences and no changes in her symptoms from baseline.  She did not respond to fluid boluses earlier and now has no IV access for further options.  She did get Norvasc and potentially Cozaar earlier today.    No fevers or chills, rigors, or other infectious signs or symptoms.  But given steadily decreasing blood pressure will transfer to ICU for vasopressors along with infectious workup.      Code Status  Full Code    Review of Systems  Review of Systems   Constitutional:  Negative for chills, fever and malaise/fatigue.   HENT:  Negative for congestion and sore throat.    Eyes: Negative.    Respiratory:  Negative for sputum production and shortness of breath.    Cardiovascular:  Negative for chest pain and palpitations.   Gastrointestinal:  Positive for nausea. Negative for abdominal pain and vomiting.   Genitourinary: Negative.    Musculoskeletal: Negative.    Skin: Negative.    Neurological:  Positive for weakness. Negative for focal weakness and headaches.   All other systems reviewed and are negative.      Past Medical History    has a past medical history of Acute pulmonary edema (HCC) (09/05/2022), Anemia, Arrhythmia (Slight), Arthritis, Cancer (HCC), Cataract, COVID-19, Depression, Heart burn, Hepatitis B, Hepatitis C (Not sure what kind of Hepatitis), Hiatus hernia syndrome, High cholesterol, Hyperlipidemia, Hypertension, Indigestion, Interatrial cardiac shunt (09/05/2022), Obesity, Pneumonia, Prediabetes, Pulmonary hypertension (HCC) (09/05/2022), Urinary incontinence, and Uterine cancer (HCC).    Surgical History   has a past surgical history that includes open reduction; abdominal hysterectomy total; hernia repair; other; and colon resection.    Family History  Reviewed and not pertinent    Social History   reports that she has never smoked. She has never used smokeless tobacco. She reports that she does not currently use alcohol. She reports that she does not use drugs.    Medications  Home Medications       Reviewed by Cherry Zuniga R.N. (Registered Nurse) on 01/02/24 at 2002  Med List Status: Complete     Medication Last Dose Status   acetaminophen (TYLENOL) 500 MG Tab  Active   amLODIPine (NORVASC) 5 MG Tab 1/1/2024 Active   ascorbic acid (VITAMIN C) 1000 MG tablet  Active   Azelastine (ASTELIN) 137 MCG/SPRAY Solution  Active   carboxymethylcellulose (REFRESH TEARS) 0.5 % Solution  Active   citalopram (CELEXA) 40 MG Tab  Active   diphenhydrAMINE (BENADRYL) 25 MG capsule  Active   ferrous sulfate 325 (65 Fe) MG tablet  Active   furosemide (LASIX) 40 MG Tab  Active   HYDROcodone-acetaminophen (NORCO) 7.5-325 MG tab  Active   HYDROcodone-acetaminophen (NORCO) 7.5-325 MG tab  Active   HYDROcodone-acetaminophen (NORCO) 7.5-325 MG tab  Active   losartan (COZAAR) 100 MG Tab 1/1/2024 Active   Multiple Vitamins-Minerals (PRESERVISION AREDS 2) Cap  Active   omeprazole (PRILOSEC OTC) 20 MG tablet  Active   Polyethylene Glycol 400 (BLINK TEARS) 0.25 % Solution  Active   potassium chloride SA (K-DUR) 10 MEQ Tab CR  Active    pravastatin (PRAVACHOL) 20 MG Tab  Active   scopolamine (TRANSDERM-SCOP) 1 mg/72hr PATCH 72 HR  Active   ursodiol (ACTIGALL) 300 MG Cap  Active   Vitamin D-Vitamin K (VITAMIN K2-VITAMIN D3 PO)  Active                    Allergies  Allergies   Allergen Reactions    Shellfish Allergy Rash and Vomiting     Sick     Iodine Rash and Vomiting     Has a Shellfish allergy.  Is OK with contrast if given pre-meds first.  Has not tried topical iodine/betadine - doesn't want to chance it      Shrimp (Diagnostic) Vomiting and Nausea     Vomiting, feeling sick.     Gramineae Pollens Unspecified and Rash     Runny nose      Penicillins Rash     Decades ago as a child           Vital Signs last 24 hours  Temp:  [35.7 °C (96.2 °F)-36.7 °C (98 °F)] 36.6 °C (97.9 °F)  Pulse:  [64-88] 84  Resp:  [16-18] 18  BP: ()/(21-56) 86/56  SpO2:  [97 %-99 %] 99 %      Physical Exam  Physical Exam  Vitals and nursing note reviewed. Exam conducted with a chaperone present.   Constitutional:       General: She is not in acute distress.     Appearance: Normal appearance. She is not ill-appearing.   HENT:      Head: Normocephalic.      Mouth/Throat:      Mouth: Mucous membranes are moist.   Eyes:      Extraocular Movements: Extraocular movements intact.   Cardiovascular:      Rate and Rhythm: Normal rate and regular rhythm.      Pulses: Normal pulses.   Pulmonary:      Effort: Pulmonary effort is normal. No respiratory distress.   Abdominal:      General: There is no distension.      Palpations: Abdomen is soft.      Tenderness: There is no abdominal tenderness. There is no guarding or rebound.      Comments: Hernia present, soft, not incarcerated   Musculoskeletal:         General: Normal range of motion.      Cervical back: Normal range of motion and neck supple.   Skin:     General: Skin is warm and dry.      Capillary Refill: Capillary refill takes less than 2 seconds.   Neurological:      General: No focal deficit present.      Mental  Status: She is alert and oriented to person, place, and time. Mental status is at baseline.           Fluids  No intake or output data in the 24 hours ending 01/04/24 0034      Laboratory  Recent Results (from the past 48 hour(s))   CBC WITH DIFFERENTIAL    Collection Time: 01/02/24  1:10 PM   Result Value Ref Range    WBC 7.3 4.8 - 10.8 K/uL    RBC 3.68 (L) 4.20 - 5.40 M/uL    Hemoglobin 10.4 (L) 12.0 - 16.0 g/dL    Hematocrit 31.8 (L) 37.0 - 47.0 %    MCV 86.4 81.4 - 97.8 fL    MCH 28.3 27.0 - 33.0 pg    MCHC 32.7 32.2 - 35.5 g/dL    RDW 54.3 (H) 35.9 - 50.0 fL    Platelet Count 479 (H) 164 - 446 K/uL    MPV 9.6 9.0 - 12.9 fL    Neutrophils-Polys 80.80 (H) 44.00 - 72.00 %    Lymphocytes 7.10 (L) 22.00 - 41.00 %    Monocytes 10.40 0.00 - 13.40 %    Eosinophils 1.00 0.00 - 6.90 %    Basophils 0.30 0.00 - 1.80 %    Immature Granulocytes 0.40 0.00 - 0.90 %    Nucleated RBC 0.00 0.00 - 0.20 /100 WBC    Neutrophils (Absolute) 5.90 1.82 - 7.42 K/uL    Lymphs (Absolute) 0.52 (L) 1.00 - 4.80 K/uL    Monos (Absolute) 0.76 0.00 - 0.85 K/uL    Eos (Absolute) 0.07 0.00 - 0.51 K/uL    Baso (Absolute) 0.02 0.00 - 0.12 K/uL    Immature Granulocytes (abs) 0.03 0.00 - 0.11 K/uL    NRBC (Absolute) 0.00 K/uL   COMP METABOLIC PANEL    Collection Time: 01/02/24  1:10 PM   Result Value Ref Range    Sodium 135 135 - 145 mmol/L    Potassium 3.7 3.6 - 5.5 mmol/L    Chloride 92 (L) 96 - 112 mmol/L    Co2 27 20 - 33 mmol/L    Anion Gap 16.0 7.0 - 16.0    Glucose 115 (H) 65 - 99 mg/dL    Bun 36 (H) 8 - 22 mg/dL    Creatinine 1.18 0.50 - 1.40 mg/dL    Calcium 9.2 8.5 - 10.5 mg/dL    Correct Calcium 10.0 8.5 - 10.5 mg/dL    AST(SGOT) 12 12 - 45 U/L    ALT(SGPT) 7 2 - 50 U/L    Alkaline Phosphatase 134 (H) 30 - 99 U/L    Total Bilirubin 0.6 0.1 - 1.5 mg/dL    Albumin 3.0 (L) 3.2 - 4.9 g/dL    Total Protein 6.8 6.0 - 8.2 g/dL    Globulin 3.8 (H) 1.9 - 3.5 g/dL    A-G Ratio 0.8 g/dL   LIPASE    Collection Time: 01/02/24  1:10 PM   Result Value Ref  Range    Lipase 9 (L) 11 - 82 U/L   Lactic Acid    Collection Time: 24  1:10 PM   Result Value Ref Range    Lactic Acid 1.1 0.5 - 2.0 mmol/L   Magnesium    Collection Time: 24  1:10 PM   Result Value Ref Range    Magnesium 2.0 1.5 - 2.5 mg/dL   ESTIMATED GFR    Collection Time: 24  1:10 PM   Result Value Ref Range    GFR (CKD-EPI) 46 (A) >60 mL/min/1.73 m 2   Lactic Acid    Collection Time: 24  3:18 PM   Result Value Ref Range    Lactic Acid 0.7 0.5 - 2.0 mmol/L   TROPONIN    Collection Time: 24  3:18 PM   Result Value Ref Range    Troponin T 27 (H) 6 - 19 ng/L   EKG (NOW)    Collection Time: 24  3:46 PM   Result Value Ref Range    Report       [facility] Emergency Dept.    Test Date:  2024  Pt Name:    JENNI NIXON                 Department: ER  MRN:        [id number]                      Room:       [facility]  Gender:     Female                       Technician: [id number]  :        1940                   Requested By:ALAINA GROVE  Order #:    [id number]                    Reading MD:    Measurements  Intervals                                Axis  Rate:       81                           P:          -86  OH:         183                          QRS:        69  QRSD:       144                          T:          20  QT:         593  QTc:        689    Interpretive Statements  Sinus or ectopic atrial rhythm  Sinus pause  Probable left atrial enlargement  Right bundle branch block  Baseline wander in lead(s) V4,V5  Compared to ECG 2023 08:03:07  Ectopic atrial rhythm now present  Sinus pause or arrest now present  Sinus rhythm no longer present     CBC with Differential    Collection Time: 24  4:37 AM   Result Value Ref Range    WBC 6.5 4.8 - 10.8 K/uL    RBC 3.83 (L) 4.20 - 5.40 M/uL    Hemoglobin 10.7 (L) 12.0 - 16.0 g/dL    Hematocrit 34.3 (L) 37.0 - 47.0 %    MCV 89.6 81.4 - 97.8 fL    MCH 27.9 27.0 - 33.0 pg    MCHC 31.2 (L) 32.2 - 35.5 g/dL     RDW 57.2 (H) 35.9 - 50.0 fL    Platelet Count 370 164 - 446 K/uL    MPV 9.5 9.0 - 12.9 fL    Neutrophils-Polys 89.00 (H) 44.00 - 72.00 %    Lymphocytes 7.40 (L) 22.00 - 41.00 %    Monocytes 2.50 0.00 - 13.40 %    Eosinophils 0.00 0.00 - 6.90 %    Basophils 0.20 0.00 - 1.80 %    Immature Granulocytes 0.90 0.00 - 0.90 %    Nucleated RBC 0.00 0.00 - 0.20 /100 WBC    Neutrophils (Absolute) 5.80 1.82 - 7.42 K/uL    Lymphs (Absolute) 0.48 (L) 1.00 - 4.80 K/uL    Monos (Absolute) 0.16 0.00 - 0.85 K/uL    Eos (Absolute) 0.00 0.00 - 0.51 K/uL    Baso (Absolute) 0.01 0.00 - 0.12 K/uL    Immature Granulocytes (abs) 0.06 0.00 - 0.11 K/uL    NRBC (Absolute) 0.00 K/uL   Comp Metabolic Panel (CMP)    Collection Time: 01/03/24  4:37 AM   Result Value Ref Range    Sodium 133 (L) 135 - 145 mmol/L    Potassium 4.3 3.6 - 5.5 mmol/L    Chloride 93 (L) 96 - 112 mmol/L    Co2 23 20 - 33 mmol/L    Anion Gap 17.0 (H) 7.0 - 16.0    Glucose 151 (H) 65 - 99 mg/dL    Bun 34 (H) 8 - 22 mg/dL    Creatinine 1.01 0.50 - 1.40 mg/dL    Calcium 8.9 8.5 - 10.5 mg/dL    Correct Calcium 9.9 8.5 - 10.5 mg/dL    AST(SGOT) 10 (L) 12 - 45 U/L    ALT(SGPT) 8 2 - 50 U/L    Alkaline Phosphatase 126 (H) 30 - 99 U/L    Total Bilirubin 0.4 0.1 - 1.5 mg/dL    Albumin 2.8 (L) 3.2 - 4.9 g/dL    Total Protein 6.3 6.0 - 8.2 g/dL    Globulin 3.5 1.9 - 3.5 g/dL    A-G Ratio 0.8 g/dL   APTT    Collection Time: 01/03/24  4:37 AM   Result Value Ref Range    APTT 32.4 24.7 - 36.0 sec   Prothrombin Time    Collection Time: 01/03/24  4:37 AM   Result Value Ref Range    PT 14.9 (H) 12.0 - 14.6 sec    INR 1.15 (H) 0.87 - 1.13   ESTIMATED GFR    Collection Time: 01/03/24  4:37 AM   Result Value Ref Range    GFR (CKD-EPI) 55 (A) >60 mL/min/1.73 m 2   EKG    Collection Time: 01/03/24 11:34 PM   Result Value Ref Range    Report       [facility]    Test Date:  2024-01-03  Pt Name:    JENNI NIXON                 Department: [facility]  MRN:        [id number]                       Room:       R3  Gender:     Female                       Technician: [id number]  :        1940                   Requested By:RAMSEY HOUGH  Order #:    [id number]                    Reading MD: Ki Neri MD    Measurements  Intervals                                Axis  Rate:       82                           P:          -45  IL:         201                          QRS:        10  QRSD:       146                          T:          -17  QT:         429  QTc:        501    Interpretive Statements  Sinus or ectopic atrial rhythm  Right bundle branch block  Nonspecific ST-T wave changes  Electronically Signed On 2024 23:41:10 PST by Ki Neri MD           Imaging  IR-US GUIDED PIV   Final Result    Ultrasound-guided PERIPHERAL IV INSERTION performed by    qualified nursing staff as above.      CT-ABDOMEN-PELVIS WITH   Final Result      1.  There is no evidence of small bowel obstruction. Small bowel loops are again noted to be grouped in the right mid abdomen with possible areas of serosal metastasis.   2.  Very slight decrease in the amount of moderate loculated ascites with peritoneal nodularity and enhancement consistent with peritoneal carcinomatosis.   3.  No significant change in the moderate to large sized hiatal hernia with fluid distention of the distal esophagus. Question of mild gastric wall thickening which could represent inflammation.   4.  Stable trace of dependent left pleural effusion and left lower lobe atelectasis.      DX-CHEST-PORTABLE (1 VIEW)   Final Result      1.  No acute cardiopulmonary disease.   2.  Hiatal hernia.      DX-CHEST-LIMITED (1 VIEW)    (Results Pending)   EC-ECHOCARDIOGRAM LTD W/O CONT    (Results Pending)         Assessment/Plan  * Shock (HCC)  Assessment & Plan  Recent echo normal-will repeat limited echo to assess LV function  Possibly hypovolemic and distributive given ascites of unclear origin, did not respond to fluid bolus    No evidence of  hepatorenal syndrome-will hold off albumin for now but this is something to be considered in the future    No real infectious signs or symptoms but given unclear etiology will send blood cultures, check UA and chest x-ray and will start on some empiric antibiotics tonight.  Will check also a procalcitonin but have low threshold to discontinue antibiotics.  -Plan for ceftriaxone plus Flagyl, I have time them out for 3 days but we will have day team reevaluate need    Start midodrine 15 mg 3 times daily  Hold all home antihypertensives  Will require CVC due to complete lack of vascular access    Ascites- (present on admission)  Assessment & Plan  No malignant cells on recent paracentesis  Unlikely infected currently, would consider diagnostic paracentesis if safe pocket but will defer overnight  Empiric ceftriaxone plus Flagyl    Primary biliary cholangitis (HCC)- (present on admission)  Assessment & Plan  Followed by GI  Possible liver biopsy when she gets her diagnostic laparoscopy with gynecology oncology    Dehydration- (present on admission)  Assessment & Plan  Now fluid resuscitated    Hypertension- (present on admission)  Assessment & Plan  Hold all home antihypertensives    Microcytic anemia- (present on admission)  Assessment & Plan  Conservative transfusion strategies        DVT prophylaxis: Lovenox  PUD prophylaxis: N/A  Glycemic control: SSI if needed  Nutrition: N.p.o. for possible surgery-though this may be delayed or canceled  Lines: Will need central line due to lack of IV access and need for vasopressors    Discussed patient condition and risk of morbidity and/or mortality with RN, RT, Pharmacy, Charge nurse / hot rounds, Patient, and Gyn Onc .      The patient remains critically ill.  Critical care time = 70 minutes in directly providing and coordinating critical care and extensive data review.  No time overlap and excludes procedures.

## 2024-01-04 NOTE — PROGRESS NOTES
Updated Dr. Barajas that patient's UOP is ~ 30 for the shift so far. IVMF infusing. Decreasing vasopressor needs.   Order placed by MD for albumin.

## 2024-01-04 NOTE — ASSESSMENT & PLAN NOTE
Recent echo normal-will repeat limited echo to assess LV function  Possibly hypovolemic and distributive given ascites of unclear origin, did not respond to fluid bolus  Wide PP and warm extremities argues for vasodilatory state, possible related to poor nutrition and cirrhosis    Cortisol and TSH WNL    No evidence of hepatorenal syndrome  Will give a trial bolus of albumin to support renal perfusion in this vasodilatory state    No real infectious signs or symptoms but given unclear etiology   UA somewhat concerning though without dysuria    Will check also a procalcitonin but have low threshold to discontinue antibiotics.  -Plan for ceftriaxone plus Flagyl, I have time them out for 3 days, will re-evaluate daily  -Cxs still pending, PCT negative  -Discontinue antibiotics, and observe    Start midodrine 15 mg 3 times daily  Hold all home antihypertensives  Will require CVC due to complete lack of vascular access, recommend port access

## 2024-01-04 NOTE — PROGRESS NOTES
Regarding my site selection rationale for right femoral central venous catheter:    Multiple attempts made by APRN in IJ's.  Patient with decreased neck mobility and difficulty passing the wire on multiple attempts past ~10 cm.  Unclear if there is some stenosis in the vessel or perhaps clot though the vessels appeared patent.  Also noted rapid clotting on wire and in the syringe shortly after aspirating blood.    Given this, the patient's inability to lie flat, and decreased tolerance for further invasive attempts in the neck I elected to go in the femoral vessels.  However the femoral anatomy on the right side was suboptimal unless proximal approach was taken.  I typically avoid proximal approaches for central venous catheters but had excellent visualization of the needle tip all the way down to the vessel and was able to access a reasonably sized vein and thread the wire up into her IVC without difficulty.  The needle was in subcutaneous tissue the entire time and never passed distally to the vessel.    While I would prefer not to use the femoral vessel as proximal as I had to go, I felt that the risks of not accessing the vessel were higher than the risks of proceeding with catheter placement, and so I placed the line.  She requires vasopressors, has no venous access at all, and ultrasound-guided IVs which were placed earlier have all ceased to function.    So her vascular anatomy is a bit of a conundrum.  Given her age, underlying comorbidities and now 2 hospitalizations in the past month I wonder if proceeding with a port placement would be the best course of action.  That way in the event of future admissions (which are probably likely), we will not need to attempt further difficult vascular access for her and she will always have central venous access in case there is a need for additional vasopressors.    I will relay my thoughts to the day team but wanted to take some time to write this note explaining my  rationale for her central line site selection, and my recommendation that a port be placed.    Yandel Ryan M.D.

## 2024-01-04 NOTE — PROGRESS NOTES
4 Eyes Skin Assessment Completed by MINE Croft and MINE Finnegan.    Head WDL  Ears WDL  Nose WDL  Mouth WDL  Neck Discoloration brown elevated ovoid areas of skin, baseline  Breast/Chest Discoloration, brown elevated ovoid areas of skin, baseline  Shoulder Blades WDL  Spine Redness and Blanching, brown elevated ovoid areas of skin, baseline  (R) Arm/Elbow/Hand Discoloration  (L) Arm/Elbow/Hand Discoloration  Abdomen Redness and Blanching, brown elevated ovoid areas of skin, baseline  Groin Redness and Blanching, moist  Scrotum/Coccyx/Buttocks Redness, Blanching, and Discoloration          (R) Leg WDL  (L) Leg Redness  (R) Heel/Foot/Toe Redness, bunion baseline    (L) Heel/Foot/Toe WDL          Devices In Places ECG, Blood Pressure Cuff, Pulse Ox, and SCD's      Interventions In Place Sacral Mepilex, TAP System, Pillows, Q2 Turns, and Low Air Loss Mattress, interdry to pannus    Possible Skin Injury Yes    Pictures Uploaded Into Epic Yes  Wound Consult Placed Yes  RN Wound Prevention Protocol Ordered Yes

## 2024-01-04 NOTE — CARE PLAN
The patient is Watcher - Medium risk of patient condition declining or worsening    Shift Goals  Clinical Goals: IV fluids, prep for procedure  Patient Goals: rest, procedure tomorrow    Progress made toward(s) clinical / shift goals:  Patient is AxO x4 and understands plan of care, all questions answered at this time. Call light and personal belongings are within reach. Pt calls appropriately for nursing needs. Frequent rounding in place. Bed is locked and in lowest position. Pt educated on bowel prep states understanding instructions. Pt updated on NPO at 0000.     Patient is not progressing towards the following goals: NA

## 2024-01-04 NOTE — ASSESSMENT & PLAN NOTE
No malignant cells on recent paracentesis  Unlikely infected currently, may warrant diagnostic para if worsens or trends toward a sepsis syndrome

## 2024-01-04 NOTE — PROCEDURES
"Central Line Insertion    Date/Time: 1/4/2024 3:30 AM    Performed by: Marilyn Mota  Authorized by: Marilyn Mota    Consent:     Consent obtained:  Verbal and emergent situation    Consent given by:  Patient    Risks discussed:  Arterial puncture, incorrect placement, nerve damage, bleeding, infection and pneumothorax (Patient is familiar w/ PICC lines as she \"always requires multiple attempts\")    Alternatives discussed:  No treatment and delayed treatment  Universal protocol:     Procedure explained and questions answered to patient or proxy's satisfaction: yes      Relevant documents present and verified: yes      Test results available and properly labeled: yes      Imaging studies available: yes      Required blood products, implants, devices, and special equipment available: yes      Site/side marked: yes      Immediately prior to procedure, a time out was called: yes      Patient identity confirmed:  Verbally with patient and hospital-assigned identification number  Pre-procedure details:     Hand hygiene: Hand hygiene performed prior to insertion      Sterile barrier technique: All elements of maximal sterile technique followed      Skin preparation:  ChloraPrep    Skin preparation agent: Skin preparation agent completely dried prior to procedure    Sedation:     Sedation type:  None (no IV access)  Anesthesia:     Anesthesia method:  Local infiltration    Local anesthetic:  Lidocaine 1% w/o epi  Procedure details:     Location:  R internal jugular and L femoral    Site selection rationale:  3 attempts to RIJ, pt moved once, unable to pass wire w/ clots on wire/syringe x2; L Fem same difficulties w/ wire insertion & clotting x2; no thrombosis noted  by US. Pt has decreased neck mobility. Dr. Ryan was contacted to attempt insertion.    Patient position:  Trendelenburg    Procedural supplies:  Triple lumen    Catheter size:  7 Fr    Landmarks identified: yes      Ultrasound guidance: yes      Sterile " ultrasound techniques: Sterile gel and sterile probe covers were used      Number of attempts:  5 or more (3 attempts to RIJ, pt moved once, unable to pass wire w/ clots on wire/syringe x2; L Fem same difficulties w/ wire insertion & clotting x2; no thrombosis noted by US. Pt has decreased neck mobility. Dr. Ryan was contacted to attempt insertion.)    Successful placement: no    Post-procedure details:     Post-procedure: Pressure was held to achieve hemostasis, no hematoma noted.    Guidewire: guidewire removal confirmed      Assessment:  Blood return through all ports, no pneumothorax on x-ray, free fluid flow and placement verified by x-ray  Comments:      Procedure not successful for reasons stated above. See Dr. Ryan's note for R femoral insertion.

## 2024-01-04 NOTE — PROGRESS NOTES
Gynecologic Oncology Progress Note    Author: SHRAVAN Forbes    Date/Time: 1/4/2024 3:57 PM    Date of Admit: 1/2/2024    HD#: 2     Interval History:     Patient hypotensive overnight; not responsive to fluid bolus. Unclear etiology. No s/s of infection. Transferred to ICU further workup and management. Started on midodrine, but subsequently required vasopressor support. CVC placed overnight.   This morning she reported feeling well with no acute complaints; wondered when her surgery would happen.                Allergies   Allergen Reactions    Shellfish Allergy Rash and Vomiting     Sick     Iodine Rash and Vomiting     Has a Shellfish allergy.  Is OK with contrast if given pre-meds first.  Has not tried topical iodine/betadine - doesn't want to chance it      Shrimp (Diagnostic) Vomiting and Nausea     Vomiting, feeling sick.     Gramineae Pollens Unspecified and Rash     Runny nose      Penicillins Rash     Decades ago as a child              Current Facility-Administered Medications:     cefTRIAXone (Rocephin) syringe 2,000 mg, 2,000 mg, Intravenous, Q24HRS, Yandel Ryan M.D.    metroNIDAZOLE (Flagyl) IVPB 500 mg, 500 mg, Intravenous, Q12HRS, Yandel Ryan M.D., Held at 01/04/24 0130    thiamine (Vitamin B-1) tablet 100 mg, 100 mg, Oral, DAILY, Rinku Barajas M.D., 100 mg at 01/04/24 1048    albumin human 5% solution 25 g, 25 g, Intravenous, Once, Rinku Barajas M.D.    prochlorperazine (Compazine) tablet 10 mg, 10 mg, Oral, Q6HRS PRN, Dakota Scales P.A.-C., 10 mg at 01/03/24 1548    midodrine (Proamatine) tablet 15 mg, 15 mg, Oral, TID WITH MEALS, Yandel Ryan M.D., 15 mg at 01/04/24 1233    norepinephrine (Levophed) 8 mg in 250 mL NS infusion (premix), 0-1 mcg/kg/min (Ideal), Intravenous, Continuous, Yandel Ryan M.D., Last Rate: 9.2 mL/hr at 01/04/24 0545, 0.08 mcg/kg/min at 01/04/24 0545    [Held by provider] amLODIPine (Norvasc) tablet 5 mg, 5 mg, Oral, DAILY, Maura Osborne,  "A.P.R.N., 5 mg at 01/03/24 1735    citalopram (CeleXA) tablet 40 mg, 40 mg, Oral, DAILY, Maura Osborne, A.P.R.N., 40 mg at 01/03/24 0607    [Held by provider] losartan (Cozaar) tablet 100 mg, 100 mg, Oral, Q EVENING, Maura Osborne, A.P.R.N., 100 mg at 01/02/24 2145    HYDROcodone-acetaminophen (Norco) 5-325 MG per tablet 1.5 Tablet, 1.5 Tablet, Oral, Q8HRS PRN, Maura Osborne, A.P.R.N.    senna-docusate (Pericolace Or Senokot S) 8.6-50 MG per tablet 2 Tablet, 2 Tablet, Oral, BID, 2 Tablet at 01/03/24 0607 **AND** polyethylene glycol/lytes (Miralax) Packet 1 Packet, 1 Packet, Oral, QDAY PRN **AND** magnesium hydroxide (Milk Of Magnesia) suspension 30 mL, 30 mL, Oral, QDAY PRN **AND** bisacodyl (Dulcolax) suppository 10 mg, 10 mg, Rectal, QDAY PRN, Maura Osborne, A.P.R.N.    lactated ringers infusion, , Intravenous, Continuous, Maura Osborne A.P.R.N., Last Rate: 100 mL/hr at 01/04/24 0550, New Bag at 01/04/24 0550    prochlorperazine (Compazine) injection 10 mg, 10 mg, Intravenous, Q6HRS PRN, Maura Osborne, A.P.R.N., 10 mg at 01/03/24 2257    [Held by provider] enoxaparin (Lovenox) inj 40 mg, 40 mg, Subcutaneous, Q EVENING, Maura Osborne, A.P.R.N., 40 mg at 01/02/24 2146    acetaminophen (Tylenol) tablet 650 mg, 650 mg, Oral, Q6HRS PRN, Maura Osborne, A.P.R.N.    morphine 4 MG/ML injection 2 mg, 2 mg, Intravenous, Q2HRS PRN, AIDA ForbesP.R.N., 2 mg at 01/04/24 1518    omeprazole (PriLOSEC) capsule 20 mg, 20 mg, Oral, DAILY, AIDA ForbesP.R.N., 20 mg at 01/03/24 0607       Objective:     BP 96/54   Pulse 72   Temp 36.6 °C (97.9 °F) (Temporal)   Resp 12   Ht 1.702 m (5' 7\")   Wt 113 kg (250 lb)   SpO2 100%     Physical Exam  Constitutional:       General: She is not in acute distress.     Appearance: She is not toxic-appearing.   HENT:      Head: Normocephalic.      Mouth/Throat:      Mouth: Mucous membranes are dry.   Eyes:      General:         Right eye: No discharge.         Left eye: No discharge. "   Cardiovascular:      Rate and Rhythm: Normal rate and regular rhythm.      Pulses: Normal pulses.   Pulmonary:      Effort: Pulmonary effort is normal. No respiratory distress.   Abdominal:      General: There is no distension.      Comments: Large ventral hernia   Skin:     General: Skin is warm and dry.   Neurological:      General: No focal deficit present.   Psychiatric:         Mood and Affect: Mood normal.         Behavior: Behavior normal.                 Recent Labs     01/02/24  1310 01/03/24  0437   WBC 7.3 6.5   RBC 3.68* 3.83*   HEMOGLOBIN 10.4* 10.7*   HEMATOCRIT 31.8* 34.3*   MCV 86.4 89.6   MCH 28.3 27.9   MCHC 32.7 31.2*   RDW 54.3* 57.2*   PLATELETCT 479* 370   MPV 9.6 9.5     Recent Labs     01/02/24  1310 01/03/24  0437   SODIUM 135 133*   POTASSIUM 3.7 4.3   CHLORIDE 92* 93*   CO2 27 23   GLUCOSE 115* 151*   BUN 36* 34*   CREATININE 1.18 1.01   CALCIUM 9.2 8.9     Recent Labs     01/02/24  1310 01/03/24  0437   ASTSGOT 12 10*   ALTSGPT 7 8   TBILIRUBIN 0.6 0.4   ALKPHOSPHAT 134* 126*   GLOBULIN 3.8* 3.5   INR  --  1.15*     Recent Labs     01/03/24  0437   APTT 32.4   INR 1.15*      Radiology:  CT AP (1/2/24):  IMPRESSION:     1.  There is no evidence of small bowel obstruction. Small bowel loops are again noted to be grouped in the right mid abdomen with possible areas of serosal metastasis.  2.  Very slight decrease in the amount of moderate loculated ascites with peritoneal nodularity and enhancement consistent with peritoneal carcinomatosis.  3.  No significant change in the moderate to large sized hiatal hernia with fluid distention of the distal esophagus. Question of mild gastric wall thickening which could represent inflammation.  4.  Stable trace of dependent left pleural effusion and left lower lobe atelectasis.     Assessment and Plan:    Assessment and plan:   Ms Barron is a very pleasant 82 yo with a history of endometrial cancer. She was 4y 11 months since her last treatment when  she began developing recurrent ascites of unknown etiology, and thus was planned to undergo diagnostic laparoscopy on 1/4/24 with Dr Dalal. She subsequently developed intractable nausea with emesis and found to have signs of dehydration, and thus was admitted prior to this procedure.      Intractable Nausea with emesis:  Resolved following antiemetics in the ED. Continue antiemetics PRN. Will hold zofran at this time due to prolonged QTc.   Shock: Became hypotensive yesterday evening; remained asymptomatic. Did not respond to fluid bolus or midodrine. Now on vasopressor support s/p CVC placement. Unclear etiology. No infectious signs or symptoms, however, undergoing infectious workup per ICU team to rule out underlying infection. Empiric flagyl and rocephin per ICU team. Appreciate ICU work-up and management.   Dehydration: likely due to poor PO intake and emesis. Cr and Bun improving with IVF. S/p fluid resuscitation. Continue IVF,. Monitor lytes and replace PRN.   Endometrial Cancer: follows with Dr Dalal, 4y 11m since last treatment. Recent CT  scans with recurrent ascites of unknown etiology. Was scheduled for diagnostic laparoscopy with Dr Dalal today, however, will hold off on surgery given hemodynamic instability requiring pressors. Reschedule surgery when clinically appropriate. Patient my have a diet.   Poor venous access: ICU team unable to place IJ CVC after multiple attempts. Patient requires right femoral CVC. Consider Mediport in the future.   Hx of HTN: hold home meds  Hx of HLD: continue home meds.          Patient seen in conjunction with Dr. Dalal

## 2024-01-04 NOTE — PROGRESS NOTES
Spoke with nurse regarding low BP. She was given a bolus ~ 8 pm 500 cc. She did not respond to fluiid bolus. Per the nurse the pt is mentating well. She is currently having nausea and vomiting. BP is still low./ Pt received amalodipine this evening. I have reviewed the chart. She is afebrile. Pt is scheduled for lsc tomorrow however with this low BP, I will postpone the surgery for now.       Imp: hypotension today. Unclear etiology Secondary to anihyptertensive?, low volume although has nto responded to fluid bolus, doubt sepsis in the absence of fever, other wtiology unclear. I am going to transfer her to [facility]. I spoke with Dr. Ryan the intensivist on call and he will evaluate. Also spoke with daughter of this recent changes.       Plan: Transfer to ICU for closer monitoring and possible pressors.   2. Postpone surgery for tomorrow.

## 2024-01-04 NOTE — THERAPY
Physical Therapy Contact Note    Patient Name: Olive Alexandra  Age:  83 y.o., Sex:  female  Medical Record #: [id number]  Today's Date: 1/4/2024    PT consult received. Spoke with RN who reports pt just sat EOB, tolerated well but fatigued and feels weaker than baseline. Per EMR, pt uses slide board and power WC at baseline. Has caregiver support to assist with ADLs and mobility. Will initiate PT evaluation as warranted. Thanks     Toya Chaney, PT, DPT    [employer]te [id number]

## 2024-01-05 PROBLEM — D64.9 ANEMIA: Status: ACTIVE | Noted: 2017-01-17

## 2024-01-05 NOTE — DIETARY
"Nutrition services: Day 3 of admit.  Olive Alexandra is a 83 y.o. female with admitting DX of dehydration.     Consult received for unintentional weight loss 14-23 lbs in the last 6 months (MST 3). RD met with pt and pt's  at bedside. Pt reports weight loss however unsure how much. She states she is not sure what her weight was previously. She has been trying to lose weight over the last 6 months by cutting portion sizes. Pt notes she has also been having N/V which has contributed to poor PO and further weight loss without trying RD discussed protein and offered nutritional supplements. Pt has used Ensure plus in the past (likes vanilla flavor), RD to add TID to bolster nutrition while in acute care.       Assessment:  Height: 170.2 cm (5' 7\")  Weight: 113 kg (250 lb) via standing scale   Body mass index is 39.16 kg/m²., BMI classification: obese   Diet/Intake: Low fiber <50% of meals     Evaluation:   Pt with reported weight loss though unsure how much. Per chart review wt stable over the last few months however weight has been trending down. Given pt has also been trying to lose weight and has been cutting back on portion sizes-unable to fully assess weight loss trends at this time.   1/2/2023: 250 lbs via standing   12/07/23 113 kg (250 lb)   11/27/23 118 kg (260 lb)   08/31/23 118 kg (260 lb)   09/22/22 125 kg (275 lb)   09/04/22 (!) 138 kg (304 lb 3.8 oz)   04/19/22 125 kg (275 lb)     Current clinical picture and MD progress notes reviewed. Pt with PMHx of uterine cancer (s/p surgery and radiation)   Labs (15) K+ 3.4 (L), Phos 2.4 (L)  Meds: LR @ 100 mL/hr, mag sulfate, Kphos, senna (refused), thiamine (refused)  Skin: wound to coccyx. No edema noted   +BM 1/5    Malnutrition Risk: Unable to meet criteria per ASPEN guidelines at this time.     Recommendations/Plan:  Low fiber diet as tolerated   Addition of Vanilla Ensure plus supplements TID    Encourage intake of all meals and supplements " ~50%  Document intake of all meals and supplements as % taken in ADL's to provide interdisciplinary communication across all shifts.   Monitor weight.  Nutrition rep will continue to see patient for ongoing meal and snack preferences.       RD following

## 2024-01-05 NOTE — THERAPY
Physical Therapy Contact Note    Patient Name: Olive Alexandra  Age:  83 y.o., Sex:  female  Medical Record #: [id number]  Today's Date: 1/5/2024 01/05/24 1000   Interdisciplinary Plan of Care Collaboration   Collaboration Comments PT orders received and chart review performed. Spoke with patient would prefer to hold PT EVAL until her power WC and slideboard are here to use to practice transfers.

## 2024-01-05 NOTE — CARE PLAN
Problem: Pain - Standard  Goal: Alleviation of pain or a reduction in pain to the patient’s comfort goal  Outcome: Progressing     The patient is Watcher - Medium risk of patient condition declining or worsening    Shift Goals  Clinical Goals: wean vasopressors and maintain MAP > 65.  Strict I/Os, improved UOP, mobilize  Patient Goals: rest, comfort, update of when procedure is   Family Goals: updates    Progress made toward(s) clinical / shift goals:  Levophed weaned to maintain MAP of > 65 per MAR. Strict I/Os measured. UOP remains minimal; see progress note for details. Pt mobilized up to EOB and was able to support herself with minimal assistance. Diet restarted as procedure will not be today.     Patient is not progressing towards the following goals:  N/A

## 2024-01-05 NOTE — CARE PLAN
The patient is Stable - Low risk of patient condition declining or worsening    Shift Goals  Clinical Goals: hemodynamic stability  Patient Goals: rest and comfort; completion of surgery  Family Goals: updates    Progress made toward(s) clinical / shift goals:     Problem: Knowledge Deficit - Standard  Goal: Patient and family/care givers will demonstrate understanding of plan of care, disease process/condition, diagnostic tests and medications  Outcome: Progressing     Problem: Fall Risk  Goal: Patient will remain free from falls  Outcome: Progressing     Problem: Skin Integrity  Goal: Skin integrity is maintained or improved  Outcome: Progressing     Problem: Pain - Standard  Goal: Alleviation of pain or a reduction in pain to the patient’s comfort goal  Outcome: Progressing       Patient is not progressing towards the following goals:

## 2024-01-05 NOTE — PROGRESS NOTES
Critical Care Progress Note    Date of admission  1/2/2024    Chief Complaint  83 y.o. female with a history of uterine cancer (s/p surgery and radiation), HTN, HLD, iron deficiency anemia, hep C, NAFLD, primary biliary cholangitis, arthritis and multiple prior abdominal surgeries including appendectomy, umbilical hernia repair, robotic hysterectomy, bilateral salpingo-oophorectomy, retroperitoneal mass excision, omental resection.  She was admitted earlier today by gynecology oncology due to weakness, recurrent ascites and plan for elective diagnostic laparoscopy with potential liver biopsy.     Her admission earlier today was remarkable for initial blood pressure in the 110s/50s but has since dwindled to the 80s/30s.  No mentation differences and no changes in her symptoms from baseline.  She did not respond to fluid boluses earlier and now has no IV access for further options.  She did get Norvasc and potentially Cozaar earlier today.     No fevers or chills, rigors, or other infectious signs or symptoms.  But given steadily decreasing blood pressure will transfer to ICU for vasopressors along with infectious workup.    Hospital Course  1/5-Off pressors, discontinue anx, ok to leave ICU, discussed with Dr Dalal    Interval Problem Update  Reviewed last 24 hour events:  Hb this AM 7.9, no overt source of bleeding    Neuro:     CV:  HR 50s-60s  -140  Off NE all night    Resp:   1L NC    GI: BM x2    I/O: +3L  UOP:500    Tmax: AF  Heme: WBC 6.4    Abx:   CTX/Flagyll    Micro:   PCT normal  NGTD    Endo: BG WTR    LDA: PIVs, CVC  SUP: PPI  VTE: Enox  Diet: Low fibre      Review of Systems  Review of Systems   All other systems reviewed and are negative.       Vital Signs for last 24 hours   Temp:  [36.3 °C (97.4 °F)-36.8 °C (98.2 °F)] 36.7 °C (98 °F)  Pulse:  [44-87] 63  Resp:  [10-48] 48  BP: ()/(41-78) 129/58  SpO2:  [83 %-100 %] 90 %    Hemodynamic parameters for last 24 hours       Respiratory  Information for the last 24 hours       Physical Exam   Physical Exam  Vitals and nursing note reviewed. Exam conducted with a chaperone present.   Constitutional:       General: She is not in acute distress.     Appearance: Normal appearance. She is not ill-appearing.   HENT:      Head: Normocephalic.      Mouth/Throat:      Mouth: Mucous membranes are moist.   Eyes:      Extraocular Movements: Extraocular movements intact.   Cardiovascular:      Rate and Rhythm: Normal rate and regular rhythm.      Pulses: Normal pulses.   Pulmonary:      Effort: Pulmonary effort is normal. No respiratory distress.   Abdominal:      General: There is no distension.      Palpations: Abdomen is soft.      Tenderness: There is no abdominal tenderness. There is no guarding or rebound.      Comments: Hernia present, soft, not incarcerated   Musculoskeletal:         General: Normal range of motion.      Cervical back: Normal range of motion and neck supple.   Skin:     General: Skin is warm and dry.      Capillary Refill: Capillary refill takes less than 2 seconds.   Neurological:      General: No focal deficit present.      Mental Status: She is alert and oriented to person, place, and time. Mental status is at baseline.         Medications  Current Facility-Administered Medications   Medication Dose Route Frequency Provider Last Rate Last Admin    midodrine (Proamatine) tablet 15 mg  15 mg Oral Q8HRS Marilyn SHERWOOD Latona   15 mg at 01/05/24 1341    potassium phosphate 15 mmol in dextrose 5% 250 mL ivpb  15 mmol Intravenous Once Rinku Barajas M.D. 62.5 mL/hr at 01/05/24 1200 Rate Verify at 01/05/24 1200    cefTRIAXone (Rocephin) syringe 2,000 mg  2,000 mg Intravenous Q24HRS Yandel Ryan M.D.   2,000 mg at 01/05/24 0535    metroNIDAZOLE (Flagyl) IVPB 500 mg  500 mg Intravenous Q12HRS Yandel Ryan M.D.   Stopped at 01/05/24 0640    thiamine (Vitamin B-1) tablet 100 mg  100 mg Oral DAILY Rinku Barajas M.D.   100 mg at 01/04/24  1048    prochlorperazine (Compazine) tablet 10 mg  10 mg Oral Q6HRS PRN Dakota Scales P.A.-C.   10 mg at 01/03/24 1548    norepinephrine (Levophed) 8 mg in 250 mL NS infusion (premix)  0-1 mcg/kg/min (Ideal) Intravenous Continuous Yandel Ryan M.D.   Stopped at 01/04/24 1709    [Held by provider] amLODIPine (Norvasc) tablet 5 mg  5 mg Oral DAILY Maurasisi Osborne, A.P.R.N.   5 mg at 01/03/24 1735    citalopram (CeleXA) tablet 40 mg  40 mg Oral DAILY Maurasisi Osborne, A.P.R.N.   40 mg at 01/03/24 0607    [Held by provider] losartan (Cozaar) tablet 100 mg  100 mg Oral Q EVENING Maurasisi Osborne, A.P.R.N.   100 mg at 01/02/24 2145    HYDROcodone-acetaminophen (Norco) 5-325 MG per tablet 1.5 Tablet  1.5 Tablet Oral Q8HRS PRN Maura Osborne, A.P.R.N.   1.5 Tablet at 01/04/24 2001    senna-docusate (Pericolace Or Senokot S) 8.6-50 MG per tablet 2 Tablet  2 Tablet Oral BID Maura Osborne, A.P.R.N.   2 Tablet at 01/03/24 0607    And    polyethylene glycol/lytes (Miralax) Packet 1 Packet  1 Packet Oral QDAY PRN Maura Osborne, A.P.R.N.        And    magnesium hydroxide (Milk Of Magnesia) suspension 30 mL  30 mL Oral QDAY PRN Maura Osborne, A.P.R.N.        And    bisacodyl (Dulcolax) suppository 10 mg  10 mg Rectal QDAY PRN Maura Osborne, A.P.R.N.        lactated ringers infusion   Intravenous Continuous Maurasisi Osborne, A.P.R.N. 100 mL/hr at 01/05/24 1200 Rate Verify at 01/05/24 1200    prochlorperazine (Compazine) injection 10 mg  10 mg Intravenous Q6HRS PRN Maura Osborne, A.P.R.N.   10 mg at 01/03/24 2257    enoxaparin (Lovenox) inj 40 mg  40 mg Subcutaneous Q EVENING Maura Osborne, A.P.R.N.   40 mg at 01/04/24 1729    acetaminophen (Tylenol) tablet 650 mg  650 mg Oral Q6HRS PRN Maura Osborne, A.P.R.N.        morphine 4 MG/ML injection 2 mg  2 mg Intravenous Q2HRS PRN Maura Osborne, A.P.R.N.   2 mg at 01/04/24 1518    omeprazole (PriLOSEC) capsule 20 mg  20 mg Oral DAILY Maura Osborne, A.P.R.N.   20 mg at 01/05/24 0535       Fluids    Intake/Output  Summary (Last 24 hours) at 1/5/2024 1441  Last data filed at 1/5/2024 1200  Gross per 24 hour   Intake 5193.91 ml   Output 1020 ml   Net 4173.91 ml       Laboratory          Recent Labs     01/03/24 0437 01/05/24  0410   SODIUM 133* 138   POTASSIUM 4.3 3.4*   CHLORIDE 93* 98   CO2 23 30   BUN 34* 32*   CREATININE 1.01 0.99   MAGNESIUM  --  1.9   PHOSPHORUS  --  2.4*   CALCIUM 8.9 8.6     Recent Labs     01/03/24 0437 01/05/24  0410   ALTSGPT 8  --    ASTSGOT 10*  --    ALKPHOSPHAT 126*  --    TBILIRUBIN 0.4  --    GLUCOSE 151* 94     Recent Labs     01/03/24 0437 01/05/24  0410   WBC 6.5 6.4   NEUTSPOLYS 89.00* 79.20*   LYMPHOCYTES 7.40* 7.50*   MONOCYTES 2.50 12.00   EOSINOPHILS 0.00 0.60   BASOPHILS 0.20 0.20   ASTSGOT 10*  --    ALTSGPT 8  --    ALKPHOSPHAT 126*  --    TBILIRUBIN 0.4  --      Recent Labs     01/03/24 0437 01/05/24 0410 01/05/24  0900   RBC 3.83* 2.80*  --    HEMOGLOBIN 10.7* 7.9* 8.6*   HEMATOCRIT 34.3* 24.7* 26.9*   PLATELETCT 370 315  --    PROTHROMBTM 14.9*  --   --    APTT 32.4  --   --    INR 1.15*  --   --        Imaging      Assessment/Plan  * Shock (HCC)  Assessment & Plan  Recent echo normal-will repeat limited echo to assess LV function  Possibly hypovolemic and distributive given ascites of unclear origin, did not respond to fluid bolus  Wide PP and warm extremities argues for vasodilatory state, possible related to poor nutrition and cirrhosis    Cortisol and TSH WNL    No evidence of hepatorenal syndrome  Will give a trial bolus of albumin to support renal perfusion in this vasodilatory state    No real infectious signs or symptoms but given unclear etiology   UA somewhat concerning though without dysuria    Will check also a procalcitonin but have low threshold to discontinue antibiotics.  -Plan for ceftriaxone plus Flagyl, I have time them out for 3 days, will re-evaluate daily  -Cxs still pending, PCT negative  -Discontinue antibiotics, and observe    Start midodrine 15 mg 3  times daily  Hold all home antihypertensives  Will require CVC due to complete lack of vascular access, recommend port access    Dehydration- (present on admission)  Assessment & Plan  Now fluid resuscitated    Ascites- (present on admission)  Assessment & Plan  No malignant cells on recent paracentesis  Unlikely infected currently, may warrant diagnostic para if worsens or trends toward a sepsis syndrome      Primary biliary cholangitis (HCC)- (present on admission)  Assessment & Plan  Followed by GI  Anti-mitchondria Ab positive  Possible liver biopsy when she gets her diagnostic laparoscopy with gynecology oncology    Hypertension- (present on admission)  Assessment & Plan  Hold all home antihypertensives    Anemia- (present on admission)  Assessment & Plan  Hb to 7.9 today, no overt bleeding, recheck 8.6  Will send occult blood sample, possible occult GIB but hemodynamically stable    Conservative transfusion strategies           I have performed a physical exam and reviewed and updated ROS and Plan today (1/5/2024). In review of yesterday's note (1/4/2024), there are no changes except as documented above.     Discussed patient condition and risk of morbidity and/or mortality with RN, RT, Therapies, Pharmacy, Patient, and oncology

## 2024-01-05 NOTE — THERAPY
Occupational Therapy Contact Note    Per PT notes, pt reports feeling more deconditioned compared to baseline. Met with patient to discuss role of OT and ensure she has no OT needs. She reports no concerns, but would like to start exercising as soon as she can. She was supplied with light resistance theraband to use while in house.     Noreen Martinez, OTR/L       01/05/24 0918   Prior Living Situation   Equipment Owned Scooter   Lives with - Patient's Self Care Capacity Spouse;Attendant / Paid Care Giver   Comments Met with patient and  at bedside. Discussed PLOF/PLS. She has a hired caregiver for 2 hrs in AM and 2 hrs in PM. Caregiver assists with bed baths, brief/diaper change/toileting, and dressing. She is able to sit herself EOB with use of her bed features. She is able to transfer herself w/ min-mod help from her  or caregiver via SB>scooter. She spends most of her day in her scooter.   Prior Level of ADL Function   Self Feeding Independent   Grooming / Hygiene Independent   Bathing Requires Assist   Dressing Requires Assist   Toileting Requires Assist   Prior Level of IADL Function   Prior Level Of Mobility Uses Wheel Chair for Community Mobility;Uses Wheel Chair for in Home Mobility

## 2024-01-06 PROBLEM — I27.20 PULMONARY HYPERTENSION (HCC): Chronic | Status: RESOLVED | Noted: 2022-09-05 | Resolved: 2024-01-01

## 2024-01-06 NOTE — PROGRESS NOTES
Monitor Summary  Rhythm: SR  Rate: 61-80  Ectopy: R PVC, trigem, bigem, 2nd degree type 2 HB, BBB, 1st degree HB  .23 / .15 / .46

## 2024-01-06 NOTE — PROGRESS NOTES
Gynecologic Oncology Progress Note    Author: NOEL Reyna    Date/Time: 1/6/2024 10:28 AM    Date of Admit: 1/2/2024    HD#: 4     Interval History:  Patient with daughter, Jen, and , Jared, at bedside. She reports eventful evening secondary to increased nausea and multiple episodes of very small volume emesis, followed by one episode of emesis which she described to be about a handful this AM. She reports her nausea is now better controlled. She and her family add that they estimate her to be eating about 1/3 of her meals. She reports having a soft formed BM this AM, and continues to void without difficulty. She currently denies pain, SOB, CP, fever or chills.       Allergies   Allergen Reactions    Shellfish Allergy Rash and Vomiting     Sick     Iodine Rash and Vomiting     Has a Shellfish allergy.  Is OK with contrast if given pre-meds first.  Has not tried topical iodine/betadine - doesn't want to chance it      Shrimp (Diagnostic) Vomiting and Nausea     Vomiting, feeling sick.     Gramineae Pollens Unspecified and Rash     Runny nose      Penicillins Rash     Decades ago as a child              Current Facility-Administered Medications:     midodrine (Proamatine) tablet 15 mg, 15 mg, Oral, Q8HRS, Marilyn Mota, 15 mg at 01/05/24 2222    thiamine (Vitamin B-1) tablet 100 mg, 100 mg, Oral, DAILY, Rinku Barajas M.D., 100 mg at 01/04/24 1048    prochlorperazine (Compazine) tablet 10 mg, 10 mg, Oral, Q6HRS PRN, Dakota Scales P.A.-C., 10 mg at 01/03/24 1548    [Held by provider] amLODIPine (Norvasc) tablet 5 mg, 5 mg, Oral, DAILY, Maura Osborne, A.P.R.N., 5 mg at 01/03/24 1735    citalopram (CeleXA) tablet 40 mg, 40 mg, Oral, DAILY, Maura Osborne, A.P.R.N., 40 mg at 01/03/24 0607    [Held by provider] losartan (Cozaar) tablet 100 mg, 100 mg, Oral, Q EVENING, Maura Osborne, A.P.R.N., 100 mg at 01/02/24 2145    HYDROcodone-acetaminophen (Norco) 5-325 MG per tablet 1.5 Tablet, 1.5 Tablet, Oral, Q8HRS  "PRN, Maura Osborne, A.P.R.N., 1.5 Tablet at 01/04/24 2001    senna-docusate (Pericolace Or Senokot S) 8.6-50 MG per tablet 2 Tablet, 2 Tablet, Oral, BID, 2 Tablet at 01/03/24 0607 **AND** polyethylene glycol/lytes (Miralax) Packet 1 Packet, 1 Packet, Oral, QDAY PRN **AND** magnesium hydroxide (Milk Of Magnesia) suspension 30 mL, 30 mL, Oral, QDAY PRN **AND** bisacodyl (Dulcolax) suppository 10 mg, 10 mg, Rectal, QDAY PRN, Maura Osborne, A.P.R.N.    lactated ringers infusion, , Intravenous, Continuous, Maura Osborne, A.P.R.N., Last Rate: 100 mL/hr at 01/05/24 1200, Rate Verify at 01/05/24 1200    prochlorperazine (Compazine) injection 10 mg, 10 mg, Intravenous, Q6HRS PRN, Maura Osborne, A.P.R.N., 10 mg at 01/06/24 0900    enoxaparin (Lovenox) inj 40 mg, 40 mg, Subcutaneous, Q EVENING, Maura Osborne, A.P.R.N., 40 mg at 01/05/24 1711    acetaminophen (Tylenol) tablet 650 mg, 650 mg, Oral, Q6HRS PRN, Maura Osborne, A.P.R.N.    morphine 4 MG/ML injection 2 mg, 2 mg, Intravenous, Q2HRS PRN, Maura Osborne, A.P.R.N., 2 mg at 01/04/24 1518    omeprazole (PriLOSEC) capsule 20 mg, 20 mg, Oral, DAILY, Maura Osborne, A.P.R.N., 20 mg at 01/05/24 0535       Objective:     /65   Pulse 78   Temp 37.1 °C (98.7 °F) (Temporal)   Resp 13   Ht 1.702 m (5' 7\")   Wt 113 kg (250 lb)   SpO2 99%     Physical Exam  Vitals and nursing note reviewed.   Constitutional:       General: She is not in acute distress.     Appearance: She is not toxic-appearing.   HENT:      Head: Normocephalic.      Mouth/Throat:      Mouth: Mucous membranes are dry.   Eyes:      General:         Right eye: No discharge.         Left eye: No discharge.   Cardiovascular:      Rate and Rhythm: Normal rate and regular rhythm.      Pulses: Normal pulses.   Pulmonary:      Effort: Pulmonary effort is normal. No respiratory distress.   Abdominal:      General: There is no distension.      Hernia: A hernia is present.      Comments: Large ventral hernia, soft, non tender "   Skin:     General: Skin is warm and dry.   Neurological:      General: No focal deficit present.   Psychiatric:         Mood and Affect: Mood normal.         Behavior: Behavior normal.                 Recent Labs     01/05/24  0410 01/05/24  0900 01/06/24  0040   WBC 6.4  --  9.1   RBC 2.80*  --  3.44*   HEMOGLOBIN 7.9* 8.6* 9.7*   HEMATOCRIT 24.7* 26.9* 30.8*   MCV 88.2  --  89.5   MCH 28.2  --  28.2   MCHC 32.0*  --  31.5*   RDW 56.1*  --  57.1*   PLATELETCT 315  --  437   MPV 9.2  --  9.8     Recent Labs     01/05/24  0410 01/06/24  0040   SODIUM 138 139   POTASSIUM 3.4* 3.5*   CHLORIDE 98 98   CO2 30 29   GLUCOSE 94 94   BUN 32* 23*   CREATININE 0.99 0.70   CALCIUM 8.6 9.1                  Radiology:  CT AP (1/2/24):  IMPRESSION:     1.  There is no evidence of small bowel obstruction. Small bowel loops are again noted to be grouped in the right mid abdomen with possible areas of serosal metastasis.  2.  Very slight decrease in the amount of moderate loculated ascites with peritoneal nodularity and enhancement consistent with peritoneal carcinomatosis.  3.  No significant change in the moderate to large sized hiatal hernia with fluid distention of the distal esophagus. Question of mild gastric wall thickening which could represent inflammation.  4.  Stable trace of dependent left pleural effusion and left lower lobe atelectasis.     Assessment and plan:   Ms Barron is a very pleasant 82 yo with a history of endometrial cancer. She was 4y 11 months since her last treatment when she began developing recurrent ascites of unknown etiology, and thus was planned to undergo diagnostic laparoscopy on 1/4/24 with Dr Dalal. She subsequently developed intractable nausea with emesis and found to have signs of dehydration, and thus was admitted prior to this procedure.      Intractable Nausea with emesis:  Resolved following antiemetics in the ED. Nausea with episode of emesis overnight, if persistent will hold diet.  Continue antiemetics PRN.   Shock: Became hypotensive 1/3 evening; remained asymptomatic. Unclear etiology. Did not respond to fluid bolus or midodrine. s/p CVC placement, for pressor support, now off pressors as of 1/4 @1709.  Infectious w/u per ICU team negative at this time. Occult stool blood negative. Transferred back to [facility] 1/5. Appreciate ICU team recs.  Telemetry: due to rhythm irregularities (mobitz Type 1, afib, while in ICU and Mobitz type II overnight 1/5) have consulted cardiology for surgical clearance. Appreciate recs. Continue tele monitor.   Dehydration: likely due to poor PO intake and emesis.  S/p fluid resuscitation in ICU. Continue IVF, Monitor lytes and replace PRN.   Endometrial Cancer: follows with Dr Dalal, 4y 11m since last treatment. Recent CT  scans with recurrent ascites of unknown etiology. Was scheduled for diagnostic laparoscopy with Dr Dalal today, however, will hold off on surgery given hemodynamic instability requiring pressors. Reschedule surgery when clinically appropriate. Patient my have a diet.   Poor venous access: ICU team unable to place IJ CVC after multiple attempts. Patient requires right femoral CVC. Consider Mediport in the future.   Hx of Primary biliary cholangitis: Follows with Dr. Davies. Anti-mitchondria Ab positive. Pending liver biopsy when undergoes dxLSC.   Hx of HTN: continue to hold home meds as been normotensive since left ICU, continue with midodrine (hold for SBP>110). CTM   Hx of HLD: continue home meds.      Case and plan discussed with Dr. Mejia.

## 2024-01-06 NOTE — CARE PLAN
The patient is Stable - Low risk of patient condition declining or worsening    Shift Goals  Clinical Goals: cardiac monitor  Patient Goals: rest  Family Goals: updates    Progress made toward(s) clinical / shift goals:  pt still with needs for tele sit. Redirection needed much this afternoon/evening. Bed alarm remains on      Patient is not progressing towards the following goals:

## 2024-01-06 NOTE — CONSULTS
Cardiology Initial Consultation    Date of Service  1/6/2024    Referring Physician  Brian Dalal M.D.    Reason for Consultation  Mobitz 1 heart block and nonsustained VT    History of Presenting Illness  Olive Alexandra is a 83 y.o. female with a past medical history of previous uterine cancer and now with ascites who presented 1/2/2024 with evidence of Mobitz 1 and nonsustained V, Previous pulmonary hypertension resolved.  PFO.  Asymptomatic.  No syncope or presyncope no chest pain or shortness of breath.  Requires preoperative clearance.  On antihypertensives held now.  No AV schuyler blocking agents.  Underlying right bundle branch block.  Ongoing nausea    Review of Systems  Review of Systems    Past Medical History   has a past medical history of Acute pulmonary edema (HCC) (09/05/2022), Anemia, Arrhythmia (Slight), Arthritis, Cancer (HCC), Cataract, COVID-19, Depression, Heart burn, Hepatitis B, Hepatitis C (Not sure what kind of Hepatitis), Hiatus hernia syndrome, High cholesterol, Hyperlipidemia, Hypertension, Indigestion, Interatrial cardiac shunt (09/05/2022), Obesity, Pneumonia, Prediabetes, Pulmonary hypertension (HCC) (09/05/2022), Urinary incontinence, and Uterine cancer (HCC).    Surgical History   has a past surgical history that includes open reduction; abdominal hysterectomy total; hernia repair; other; and colon resection.    Family History  family history includes Arthritis in her maternal aunt, maternal grandfather, maternal grandmother, and mother; Heart Disease in her paternal grandmother; Lung Disease in her maternal grandfather; No Known Problems in her brother, brother, father, and sister.    Social History   reports that she has never smoked. She has never used smokeless tobacco. She reports that she does not currently use alcohol. She reports that she does not use drugs.    Medications  Prior to Admission Medications   Prescriptions Last Dose Informant Patient Reported? Taking?    Azelastine (ASTELIN) 137 MCG/SPRAY Solution  Patient Yes No   Sig: Administer 1 Spray into each nostril every day.   HYDROcodone-acetaminophen (NORCO) 7.5-325 MG tab  Patient No No   Sig: Take 1 Tablet by mouth every 8 hours as needed for Moderate Pain or Severe Pain for up to 30 days.   HYDROcodone-acetaminophen (NORCO) 7.5-325 MG tab  Patient No No   Sig: Take 1 Tablet by mouth every 8 hours as needed for Moderate Pain or Severe Pain for up to 30 days.   HYDROcodone-acetaminophen (NORCO) 7.5-325 MG tab  Patient No No   Sig: Take 1 Tablet by mouth every 8 hours as needed for Moderate Pain or Severe Pain for up to 30 days.   Multiple Vitamins-Minerals (PRESERVISION AREDS 2) Cap  Patient Yes No   Sig: Take 1 Capsule by mouth 2 times a day.   Polyethylene Glycol 400 (BLINK TEARS) 0.25 % Solution  Patient Yes No   Sig: Administer 1 Drop into both eyes every evening.   Vitamin D-Vitamin K (VITAMIN K2-VITAMIN D3 PO)  Patient Yes No   Sig: Take 1 Tablet by mouth every afternoon on Monday through Friday. 1 tablet = 5,000 units vitamin D3   acetaminophen (TYLENOL) 500 MG Tab  Patient Yes No   Sig: Take 500 mg by mouth 1 time a day as needed for Mild Pain.   amLODIPine (NORVASC) 5 MG Tab 1/1/2024 at 10pm Patient Yes No   Sig: Take 5 mg by mouth every day.   ascorbic acid (VITAMIN C) 1000 MG tablet  Patient Yes No   Sig: Take 1,000 mg by mouth every day.   carboxymethylcellulose (REFRESH TEARS) 0.5 % Solution  Patient Yes No   Sig: Administer 3-4 Drops into both eyes every morning.   citalopram (CELEXA) 40 MG Tab  Patient No No   Sig: Take 1 Tablet by mouth every day.   diphenhydrAMINE (BENADRYL) 25 MG capsule   No No   Sig: Take 1 Capsule by mouth every 6 hours as needed (nausea).   ferrous sulfate 325 (65 Fe) MG tablet  Patient Yes No   Sig: Take 325 mg by mouth 2 times a day with meals.   furosemide (LASIX) 40 MG Tab  Patient No No   Sig: Take 1 Tablet by mouth 2 times a day as needed (LE edema).   losartan (COZAAR) 100  MG Tab 1/1/2024 at 10pm Patient No No   Sig: Take 1 Tablet by mouth every day.   omeprazole (PRILOSEC OTC) 20 MG tablet  Patient Yes No   Sig: Take 20 mg by mouth every day.   potassium chloride SA (K-DUR) 10 MEQ Tab CR  Patient Yes No   Sig: Take 10 mEq by mouth every evening.   pravastatin (PRAVACHOL) 20 MG Tab  Patient No No   Sig: Take 1 Tablet by mouth at bedtime.   scopolamine (TRANSDERM-SCOP) 1 mg/72hr PATCH 72 HR   No No   Sig: Place 1 Patch on the skin every 72 hours.   ursodiol (ACTIGALL) 300 MG Cap  Patient Yes No   Sig: Take 300 mg by mouth 2 times a day with meals.      Facility-Administered Medications: None       Allergies  Allergies   Allergen Reactions    Shellfish Allergy Rash and Vomiting     Sick     Iodine Rash and Vomiting     Has a Shellfish allergy.  Is OK with contrast if given pre-meds first.  Has not tried topical iodine/betadine - doesn't want to chance it      Shrimp (Diagnostic) Vomiting and Nausea     Vomiting, feeling sick.     Gramineae Pollens Unspecified and Rash     Runny nose      Penicillins Rash     Decades ago as a child         Vital signs in last 24 hours  Temp:  [36.3 °C (97.4 °F)-37.1 °C (98.7 °F)] 37 °C (98.6 °F)  Pulse:  [63-78] 64  Resp:  [12-15] 15  BP: (120-152)/(50-70) 130/52  SpO2:  [90 %-100 %] 90 %    Physical Exam  Physical Exam  Vitals reviewed.   Constitutional:       General: She is not in acute distress.     Appearance: She is well-developed. She is not diaphoretic.      Comments: Slightly ill appears stated age   Eyes:      Conjunctiva/sclera: Conjunctivae normal.   Neck:      Thyroid: No thyroid mass or thyromegaly.      Vascular: No JVD.      Trachea: No tracheal deviation.   Cardiovascular:      Rate and Rhythm: Normal rate and regular rhythm.      Heart sounds: No murmur heard.  Pulmonary:      Effort: Pulmonary effort is normal. No respiratory distress.      Breath sounds: Normal breath sounds.   Chest:      Chest wall: No tenderness.   Abdominal:       "Palpations: Abdomen is soft.      Tenderness: There is no abdominal tenderness.      Comments: Ascites   Musculoskeletal:         General: Normal range of motion.   Skin:     General: Skin is warm and dry.   Neurological:      Mental Status: She is alert and oriented to person, place, and time.      Motor: No tremor.   Psychiatric:         Behavior: Behavior normal.         Lab Review  Lab Results   Component Value Date/Time    WBC 9.1 01/06/2024 12:40 AM    RBC 3.44 (L) 01/06/2024 12:40 AM    HEMOGLOBIN 9.7 (L) 01/06/2024 12:40 AM    HEMATOCRIT 30.8 (L) 01/06/2024 12:40 AM    MCV 89.5 01/06/2024 12:40 AM    MCH 28.2 01/06/2024 12:40 AM    MCHC 31.5 (L) 01/06/2024 12:40 AM    MPV 9.8 01/06/2024 12:40 AM      Lab Results   Component Value Date/Time    SODIUM 139 01/06/2024 12:40 AM    POTASSIUM 3.5 (L) 01/06/2024 12:40 AM    CHLORIDE 98 01/06/2024 12:40 AM    CO2 29 01/06/2024 12:40 AM    GLUCOSE 94 01/06/2024 12:40 AM    BUN 23 (H) 01/06/2024 12:40 AM    CREATININE 0.70 01/06/2024 12:40 AM      Lab Results   Component Value Date/Time    ASTSGOT 10 (L) 01/03/2024 04:37 AM    ALTSGPT 8 01/03/2024 04:37 AM     Lab Results   Component Value Date/Time    CHOLSTRLTOT 127 10/10/2023 08:38 AM    LDL 65 10/10/2023 08:38 AM    HDL 41 10/10/2023 08:38 AM    TRIGLYCERIDE 103 10/10/2023 08:38 AM    TROPONINT 27 (H) 01/02/2024 03:18 PM       No results for input(s): \"NTPROBNP\" in the last 72 hours.    Cardiac Imaging and Procedures Review  EKG:  My personal interpretation of the EKG dated 1/5/2024 is sinus rhythm with right bundle branch block and Mobitz 1.  1/4/2024 read out as atrial fibrillation but sinus rhythm with Mobitz 1  Rhythm strips all reviewed sinus rhythm with Mobitz 1 occasional nonsustained VT    Echocardiogram: 1/4/2024        Imaging      Assessment/Plan  No new Assessment & Plan notes have been filed under this hospital service since the last note was generated.  Service: Cardiac Electrophysiology  1.  History " of Mobitz 1 somewhat worsened with increased vagal tone and nausea. No symptoms related to it.  No contraindication for surgery.  No evidence of atrial fibrillation.  2.  Underlying right bundle branch block.  3.  Previous pulmonary hypertension with small PFO.  4.  Uterine cancer now with ascites.      Thank you for allowing me to participate in the care of this patient.        Please contact me with any questions.    Anton Okeefe M.D.   Cardiologist, [facility]  [phone]        1/5/2024

## 2024-01-06 NOTE — PROGRESS NOTES
Pt currently has second degree type two heart blocks with heart rate in the 40s. [facility] office was contacted. Office said they will give me a call back. Pt is asymptomatic. Recent /68.

## 2024-01-07 PROBLEM — E86.0 DEHYDRATION: Status: RESOLVED | Noted: 2024-01-01 | Resolved: 2024-01-01

## 2024-01-07 PROBLEM — R57.9 SHOCK (HCC): Status: RESOLVED | Noted: 2024-01-01 | Resolved: 2024-01-01

## 2024-01-07 NOTE — DISCHARGE PLANNING
Spoke with Dakota Scales PAC for Dr. Dalal. They hve requested W/C transport for patient to discharge home. Spoke with [facility] at [facility], they have very limited times today due to staffing. Spoke with Leader on call, they have auth  Approved Services for patients transport    Patient to transfer to her home today at [phone] via [facility]. RSV: [id number] Cost is $137.76. Beside nurse Ning advised of transport time. NESHA Gonsales advised..

## 2024-01-07 NOTE — PROGRESS NOTES
Monitor Summary     SR 70-96  I/O 2nd degree type 1    (F) PVC  (R) Trig  (R) Coup    .14/.09/.37

## 2024-01-07 NOTE — DISCHARGE INSTRUCTIONS
Follow up with Dr Dalal at [facility] on Wednesday 1/10/24 @ 10am  Check BP at home 3x daily, keep log and bring to appointment. (Patient reports having home BP monitor)  Hold home BP meds, only to take if SBP>150

## 2024-01-07 NOTE — DISCHARGE SUMMARY
Discharge Summary    CHIEF COMPLAINT ON ADMISSION  Chief Complaint   Patient presents with    Nausea     Nausea x1mo       Reason for Admission  nausea with emesis, hx of endometrial CA.     Admission Date  2024    CODE STATUS  Full Code    HPI & HOSPITAL COURSE  Mrs. Alexandra is a pleasant 83 year old  white female whose LMP was in her early 50's. She has a past medical history significant for HTN, hyperlipidemia, anemia (iron deficiency), elevated BMI, hepatitis A, fatty liver, arthritis with secondary chronic knee pain and shoulder pain, and right rotator cuff injury. She has an extensive past surgical history including an umbilical hernia repair, tonsillectomy, and surgical fixation for a broken right ankle. She was in her usual state of health until she noticed postmenopausal bleeding. She subsequently underwent a robotic hysterectomy, BSO, RP mass excision, appendectomy, cytoreduction, enterolysis, and omental resection on 19. 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 She had this surgery in [place]. She is s/p 5040 cGy IMRT with Dr. Crews in 2019, and was followed for surveillance every six months by Dr. Ash Kuo. She does not believe that she had a  measured before or during her treatment and is unsure when her last imaging was. She is not on HRT and denies prior use of HRT.      She moved to [place] from [place] to be closer to her daughter who works at [employer] and subsequently was referred to our office in 2021 for further monitoring of her previous endometrial cancer. She is unable to walk since 3/13/19 and currently uses a scooter for mobilization. Has a caregiver that helps her with ADLs twice daily.      She was seen for  consultation 1/10/22. Again, she is not on any HRT. Overall she was doing well, asymptomatic from a  gynecologic standpoint. Unable to obtain a weight today due to not being able to stand. Her last pelvic examination was in June of 2021 with Dr. Kuo's office. Endorses ongoing urinary incontinence. She denies any other significant symptoms.  She continued with cancer surveillance undergoing routine CT scans and follow up appointment and was 4yr 11months since her initial surgery.      She subsequently presented to [facility] ED on 12/7/23 c/o uncontrolled nausea and vomiting. CT of abd/pelvis revealed new moderate to large amount of loculated ascites with peritoneal enhancement and nodularity consistent with peritoneal carcinomatosis. No bowel obstruction or acute inflammation although serosal metastasis cannot be excluded. Moderate to largesized hiatal hernia is unchanged. Trace of dependent left pleural effusion and bibasilar atelectasis.     On 12/13/23,  presents for treatment planning. She denies any episodes of nausea and vomiting since being discharged. She reports building up her appetite again. Se reports constipation. She is here with her daughter and  to discuss treatment planning. She denies any vaginal bleeding, discharge, pelvic pain, leg edema, and changes in gastrointestinal or genitourinary symptoms. She denies having any unexplained nausea, vomiting, diarrhea, or bloating. She states her diet has been good, denies having any loss of appetite or early satiety. She denies experiencing any shortness of breath or difficulty breathing. Due to the unclear etiology of her recurrent ascites, with multiple paracentesis showing no malignant cells she was counseled regarding diagnostic laparoscopy in efforts to make a definitive diagnosis. Differential discussed with patient includes recurrence, low grade appendiceal cancer. This procedure was planned for 1/4/24; however, the patient again  developed decreased appetite, and intractable nausea and vomiting, prompting direct admission in anticipation for her procedure on 1/4/24.    In the evening of 1/3 she became hypotensive, unresponsive to fluid resuscitation, requiring transfer to ICU for pressor support. Infectious workup in the ICU, negative. She was weaned off of pressors as well as PO midodrine, with unknown etiology of this episode, but likely related to increased vagal tone from emesis. She was transferred back to CNU on 1/5/24.     Once back on the floor we continued to hold patient's home antiHTN meds, and she continued to be normotensive. We continued to manage her N/V, with PRN antiemetics with good control. We continued telemetry monitoring, which demonstrated SR, in and out of Mobitz type I. For this reason, as well as bradycardic episode causing ICU transfer, we consulted cardiology. Other then the bradycardic/ hypotensive episode, she remained asymptomatic from a cardiology standpoint. She was evaluated by cardiology, who again noted mobitz type 1, likely worsened with increased vagal tone secondary to nausea/emesis, and added that the patient had no contraindication to undergo surgical procedures at this time.     Patient was seen and evaluated at bedside this morning. She reports her nausea has subsided, and denies emesis. Her vital signs have remained stable, with the holding of home antiHTN meds and midodrine. Her exam is notable only for a large ventral hernia, soft, non-incarcerated. Therefore, she is discharged in fair and stable condition to home with close outpatient follow-up. We will continue to hold her home anti HTN medications, unless SBP >150. She is to monitor her BP while outpatient 3x daily and bring log to her follow up appointment with Dr Dalal on 1/10/24.       Discharge Date  1/7/24    FOLLOW UP ITEMS POST DISCHARGE  Monitor home BP 3x daily, bring log to follow up appointment, as below.     DISCHARGE  DIAGNOSES  Principal Problem (Resolved):    Shock (HCC) (POA: Unknown)  Active Problems:    Anemia (POA: Yes)    Hypertension (POA: Yes)      Overview: Currently on amlodipine 5 mg and losartan 100 mg daily and furosemide 40       mg twice daily as needed home BP Runs in 120s over 60s at home.          Primary biliary cholangitis (HCC) (Chronic) (POA: Yes)      Overview: Chronic, stable, on ursodiol.  Follows with GI.     Ascites (POA: Yes)  Resolved Problems:    Dehydration (POA: Yes)      FOLLOW UP    Brian Dalal M.D.  [address]  [place] [address]  [phone]    Follow up on 1/10/2024  @10:00am      MEDICATIONS ON DISCHARGE     Medication List        CONTINUE taking these medications        Instructions   acetaminophen 500 MG Tabs  Commonly known as: Tylenol   Take 500 mg by mouth 1 time a day as needed for Mild Pain.  Dose: 500 mg     amLODIPine 5 MG Tabs  Commonly known as: Norvasc   Take 5 mg by mouth every day.  Dose: 5 mg     ascorbic acid 1000 MG tablet  Commonly known as: Vitamin C   Take 1,000 mg by mouth every day.  Dose: 1,000 mg     Azelastine 137 MCG/SPRAY Soln  Commonly known as: Astelin   Administer 1 Spray into each nostril every day.  Dose: 1 Spray     Banophen 25 MG capsule  Generic drug: diphenhydrAMINE   Take 1 Capsule by mouth every 6 hours as needed (nausea).  Dose: 25 mg     Blink Tears 0.25 % Soln  Generic drug: Polyethylene Glycol 400   Administer 1 Drop into both eyes every evening.  Dose: 1 Drop     citalopram 40 MG Tabs  Commonly known as: CeleXA   Take 1 Tablet by mouth every day.  Dose: 40 mg     ferrous sulfate 325 (65 Fe) MG tablet   Take 325 mg by mouth 2 times a day with meals.  Dose: 325 mg     furosemide 40 MG Tabs  Commonly known as: Lasix   Take 1 Tablet by mouth 2 times a day as needed (LE edema).  Dose: 40 mg     * HYDROcodone-acetaminophen 7.5-325 MG tab  Commonly known as: Norco   Doctor's comments: From 12/08/2023 to 1/7/2024.  May fill 1-2 days  earlier. NOT A DUPLICATE! Controlled substance 90 days supply, month #1/3, please fill according to the dates above. Do not send refill requests.  Take 1 Tablet by mouth every 8 hours as needed for Moderate Pain or Severe Pain for up to 30 days.  Dose: 1 Tablet     * HYDROcodone-acetaminophen 7.5-325 MG tab  Start taking on: January 8, 2024  Commonly known as: Norco   Doctor's comments: From 1/8/2024 to 2/7/2024. May fill 1-2 days earlier. NOT A DUPLICATE! Controlled substance 90 days supply, month #2/3, please fill according to the dates above. Do not send refill requests.  Take 1 Tablet by mouth every 8 hours as needed for Moderate Pain or Severe Pain for up to 30 days.  Dose: 1 Tablet     * HYDROcodone-acetaminophen 7.5-325 MG tab  Start taking on: February 8, 2024  Commonly known as: Norco   Doctor's comments: From 2/08/2024 to 3/9/2024. May fill 1-2 days earlier. NOT A DUPLICATE! Controlled substance 90 days supply, month #3/3, please fill according to the dates above. Do not send refill requests.  Take 1 Tablet by mouth every 8 hours as needed for Moderate Pain or Severe Pain for up to 30 days.  Dose: 1 Tablet     losartan 100 MG Tabs  Commonly known as: Cozaar   Take 1 Tablet by mouth every day.  Dose: 100 mg     potassium chloride SA 10 MEQ Tbcr  Commonly known as: K-Dur   Take 10 mEq by mouth every evening.  Dose: 10 mEq     pravastatin 20 MG Tabs  Commonly known as: Pravachol   Take 1 Tablet by mouth at bedtime.  Dose: 20 mg     PreserVision AREDS 2 Caps   Take 1 Capsule by mouth 2 times a day.  Dose: 1 Capsule     PriLOSEC OTC 20 MG tablet  Generic drug: omeprazole   Take 20 mg by mouth every day.  Dose: 20 mg     Refresh Tears 0.5 % Soln  Generic drug: carboxymethylcellulose   Administer 3-4 Drops into both eyes every morning.  Dose: 3-4 Drop     scopolamine 1 mg/72hr Pt72  Commonly known as: Transderm-Scop   Place 1 Patch on the skin every 72 hours.  Dose: 1 Patch     ursodiol 300 MG Caps  Commonly  known as: Actigall   Take 300 mg by mouth 2 times a day with meals.  Dose: 300 mg     VITAMIN K2-VITAMIN D3 PO   Take 1 Tablet by mouth every afternoon on Monday through Friday. 1 tablet = 5,000 units vitamin D3  Dose: 1 Tablet           * This list has 3 medication(s) that are the same as other medications prescribed for you. Read the directions carefully, and ask your doctor or other care provider to review them with you.                  Allergies  Allergies   Allergen Reactions    Shellfish Allergy Rash and Vomiting     Sick     Iodine Rash and Vomiting     Has a Shellfish allergy.  Is OK with contrast if given pre-meds first.  Has not tried topical iodine/betadine - doesn't want to chance it      Shrimp (Diagnostic) Vomiting and Nausea     Vomiting, feeling sick.     Gramineae Pollens Unspecified and Rash     Runny nose      Penicillins Rash     Decades ago as a child         DIET  Orders Placed This Encounter   Procedures    Diet Order Diet: Low Fiber(GI Soft)     Standing Status:   Standing     Number of Occurrences:   1     Order Specific Question:   Diet:     Answer:   Low Fiber(GI Soft) [2]       ACTIVITY  As tolerated.  Weight bearing as tolerated    CONSULTATIONS  Critical care, Cardiology    PROCEDURES  CVC placement     LABORATORY  Lab Results   Component Value Date    SODIUM 139 01/06/2024    POTASSIUM 3.5 (L) 01/06/2024    CHLORIDE 98 01/06/2024    CO2 29 01/06/2024    GLUCOSE 94 01/06/2024    BUN 23 (H) 01/06/2024    CREATININE 0.70 01/06/2024        Lab Results   Component Value Date    WBC 9.1 01/06/2024    HEMOGLOBIN 9.7 (L) 01/06/2024    HEMATOCRIT 30.8 (L) 01/06/2024    PLATELETCT 437 01/06/2024        Total time of the discharge process exceeds 15 minutes.

## 2024-01-07 NOTE — PROGRESS NOTES
Notified on call physician of abnormal tele findings.     [id number] MD returned call. Advised to call MD back if patient has any more bouts or becomes symptomatic.

## 2024-01-08 NOTE — CARE PLAN
The patient is Stable - Low risk of patient condition declining or worsening    Shift Goals  Clinical Goals: nausea control, central line dressing change, monitor vs  Patient Goals: sleep  Family Goals: not at bedside    Progress made toward(s) clinical / shift goals:    Problem: Knowledge Deficit - Standard  Goal: Patient and family/care givers will demonstrate understanding of plan of care, disease process/condition, diagnostic tests and medications  Description: Target End Date:  1-3 days or as soon as patient condition allows    Document in Patient Education    1.  Patient and family/caregiver oriented to unit, equipment, visitation policy and means for communicating concern  2.  Complete/review Learning Assessment  3.  Assess knowledge level of disease process/condition, treatment plan, diagnostic tests and medications  4.  Explain disease process/condition, treatment plan, diagnostic tests and medications  Outcome: Discharged - Not Met     Problem: Fall Risk  Goal: Patient will remain free from falls  Description: Target End Date:  Prior to discharge or change in level of care    Document interventions on the Luz Abraham Fall Risk Assessment    1.  Assess for fall risk factors  2.  Implement fall precautions  Outcome: Discharged - Not Met     Problem: Skin Integrity  Goal: Skin integrity is maintained or improved  Description: Target End Date:  Prior to discharge or change in level of care    Document interventions on Skin Risk/Gerber flowsheet groups and corresponding LDA    1.  Assess and monitor skin integrity, appearance and/or temperature  2.  Assess risk factors for impaired skin integrity and/or pressures ulcers  3.  Implement precautions to protect skin integrity in collaboration with interdisciplinary team  4.  Implement pressure ulcer prevention protocol if at risk for skin breakdown  5.  Confirm wound care consult if at risk for skin breakdown  6.  Ensure patient use of pressure relieving devices   (Low air loss bed, waffle overlay, heel protectors, ROHO cushion, etc)  Outcome: Discharged - Not Met     Problem: Pain - Standard  Goal: Alleviation of pain or a reduction in pain to the patient’s comfort goal  Description: Target End Date:  Prior to discharge or change in level of care    Document on Vitals flowsheet    1.  Document pain using the appropriate pain scale per order or unit policy  2.  Educate and implement non-pharmacologic comfort measures (i.e. relaxation, distraction, massage, cold/heat therapy, etc.)  3.  Pain management medications as ordered  4.  Reassess pain after pain med administration per policy  5.  If opiods administered assess patient's response to pain medication is appropriate per POSS sedation scale  6.  Follow pain management plan developed in collaboration with patient and interdisciplinary team (including palliative care or pain specialists if applicable)  Outcome: Discharged - Not Met     Pt discharged per order.  Central line and PIV removed, no bleeding noted.  Reviewed discharge paperwork with pt and daughter; all questions answered. Signed copy placed in chart, copy with pt.  Pt and all belongings off unit with [facility] for transport home.     Patient is not progressing towards the following goals:

## 2024-01-08 NOTE — PROGRESS NOTES
Transitional Care Management  TCM Outreach Date and Time: Filed (1/8/2024  9:07 AM)    Discharge Questions  Actual Discharge Date: 01/07/24  Now that you are home, how are you feeling?: Good (overall feeling better, but still fatigued. Pt believes she just needs to get some rest as she only just got home)  Did you receive any new prescriptions?: No  Were you able to get them filled?: Yes  Meds to Bed or Pharmacy filled?: Pharmacy  Do you have any questions about your current medications or new medications (Review Med Rec)?: No  Did you have any durable medical equipment ordered?: No  Do you have a follow up appointment scheduled with your PCP?: Yes  Was an appointment scheduled for the patient?: Yes  Appointment Date: 01/09/24  Appointment Time: 0920  Any issues or paperwork you wish to discuss with your PCP?: No  Are you (patient) able to get to the appointment?: Yes (virtual appt as requested by pt)  If Home Health was ordered, have they contacted you (Patient): Not Applicable  Did you have enough support after your last discharge?: Yes  Does this patient qualify for the CCM program?: Yes    Transitional Care  Number of attempts made to contact patient: 1  Current or previous attempts competed within two business days of discharge? : Yes  Provided education regarding treatment plan, medications, self-management, ADLs?: Yes  Has patient completed an Advanced Directive?: No  Has the Care Manager's phone number provided?: Yes  Is there anything else I can help you with?: No    Discharge Summary  Chief Complaint: nausea - nausea x 1 month  Admitting Diagnosis: nausea with emesis, hx of endometrial CA.  Discharge Diagnosis: shock

## 2024-01-08 NOTE — PROGRESS NOTES
Pt off unit approx 1540 via [facility]. GMT did not happen as they arrived with wheelchair, not gurney.

## 2024-01-09 PROBLEM — Z09 HOSPITAL DISCHARGE FOLLOW-UP: Status: ACTIVE | Noted: 2024-01-01

## 2024-01-09 NOTE — PROGRESS NOTES
Subjective:     This visit was conducted via Zoom using secure and encrypted videoconferencing technology.   The patient was in their home in the [facility].    The patient's identity was confirmed and verbal consent was obtained for this virtual visit.     Olive Alexandra is a 83 y.o. female who presents for Hospital Follow-up.  Discharge Questions  Actual Discharge Date: 01/07/24  Now that you are home, how are you feeling?: Good (overall feeling better, but still fatigued. Pt believes she just needs to get some rest as she only just got home)  Did you receive any new prescriptions?: No  Were you able to get them filled?: Yes  Meds to Bed or Pharmacy filled?: Pharmacy  Do you have any questions about your current medications or new medications (Review Med Rec)?: No  Did you have any durable medical equipment ordered?: No  Do you have a follow up appointment scheduled with your PCP?: Yes  Was an appointment scheduled for the patient?: Yes  Appointment Date: 01/09/24  Appointment Time: 0920  Any issues or paperwork you wish to discuss with your PCP?: No  Are you (patient) able to get to the appointment?: Yes (virtual appt as requested by pt)  If Home Health was ordered, have they contacted you (Patient): Not Applicable  Did you have enough support after your last discharge?: Yes  Does this patient qualify for the CCM program?: Yes     Transitional Care  Number of attempts made to contact patient: 1  Current or previous attempts competed within two business days of discharge? : Yes  Provided education regarding treatment plan, medications, self-management, ADLs?: Yes  Has patient completed an Advanced Directive?: No  Has the Care Manager's phone number provided?: Yes  Is there anything else I can help you with?: No     Discharge Summary  Chief Complaint: nausea - nausea x 1 month  Admitting Diagnosis: nausea with emesis, hx of endometrial CA.  Discharge Diagnosis: shock    HPI:   Recently hospitalized for  nausea/vomiting, dehydration and hypovolemia.     Problem   Hospital Discharge Follow-Up    Discharge summary briefly: On the evening of 1/3/2024 she became hypotensive, unresponsive to fluid resuscitation, requiring transfer to ICU for vasopressor support.  Infectious workup in ICU was negative.  She was weaned off pressors as well as oral midodrine with unknown etiology of the episode.    She was transferred back to general floor, she remained normotensive.  She was in sinus rhythm on telemetry floor and out of Mobitz type I.  Cardiology was consulted, for confirmed Mobitz type I.    He was discharged home on 1/7/2024 in a stable condition.       Primary Biliary Cholangitis (Hcc)    Chronic, stable, on ursodiol.  Follows with GI.       Mild Depression    Chronic, stable on citalopram 40 mg tablet daily.       Hypertension    Currently on amlodipine 5 mg and losartan 100 mg daily and furosemide 40 mg twice daily as needed home BP Runs in 120s over 60s at home.    All these medications are currently on hold due to hypovolemia.  Patient will resume them once BP allows.         Morbid Obesity (Hcc)    Body mass index is 35.24 kg/m².  Current weight 225, close attention to weight due to ongoing illness.           Current Outpatient Medications   Medication Sig Dispense Refill    amLODIPine (NORVASC) 5 MG Tab Take 1 Tablet by mouth every day. 90 Tablet 3    scopolamine (TRANSDERM-SCOP) 1 mg/72hr PATCH 72 HR Place 1 Patch on the skin every 72 hours. 10 Patch 1    diphenhydrAMINE (BENADRYL) 25 MG capsule Take 1 Capsule by mouth every 6 hours as needed (nausea). 30 Capsule 1    acetaminophen (TYLENOL) 500 MG Tab Take 500 mg by mouth 1 time a day as needed for Mild Pain.      Azelastine (ASTELIN) 137 MCG/SPRAY Solution Administer 1 Spray into each nostril every day.      ferrous sulfate 325 (65 Fe) MG tablet Take 325 mg by mouth 2 times a day with meals.      omeprazole (PRILOSEC OTC) 20 MG tablet Take 20 mg by mouth every  day.      potassium chloride SA (K-DUR) 10 MEQ Tab CR Take 10 mEq by mouth every evening.      Vitamin D-Vitamin K (VITAMIN K2-VITAMIN D3 PO) Take 1 Tablet by mouth every afternoon on Monday through Friday. 1 tablet = 5,000 units vitamin D3      carboxymethylcellulose (REFRESH TEARS) 0.5 % Solution Administer 3-4 Drops into both eyes every morning.      Polyethylene Glycol 400 (BLINK TEARS) 0.25 % Solution Administer 1 Drop into both eyes every evening.      citalopram (CELEXA) 40 MG Tab Take 1 Tablet by mouth every day. 90 Tablet 3    losartan (COZAAR) 100 MG Tab Take 1 Tablet by mouth every day. 90 Tablet 3    HYDROcodone-acetaminophen (NORCO) 7.5-325 MG tab Take 1 Tablet by mouth every 8 hours as needed for Moderate Pain or Severe Pain for up to 30 days. 90 Tablet 0    [START ON 2/8/2024] HYDROcodone-acetaminophen (NORCO) 7.5-325 MG tab Take 1 Tablet by mouth every 8 hours as needed for Moderate Pain or Severe Pain for up to 30 days. 90 Tablet 0    pravastatin (PRAVACHOL) 20 MG Tab Take 1 Tablet by mouth at bedtime. 90 Tablet 3    furosemide (LASIX) 40 MG Tab Take 1 Tablet by mouth 2 times a day as needed (LE edema). 180 Tablet 3    Multiple Vitamins-Minerals (PRESERVISION AREDS 2) Cap Take 1 Capsule by mouth 2 times a day.      ursodiol (ACTIGALL) 300 MG Cap Take 300 mg by mouth 2 times a day with meals.      ascorbic acid (VITAMIN C) 1000 MG tablet Take 1,000 mg by mouth every day.       No current facility-administered medications for this visit.     Allergies:   Shellfish allergy, Iodine, Shrimp (diagnostic), Gramineae pollens, and Penicillins    Social History     Tobacco Use    Smoking status: Never    Smokeless tobacco: Never   Vaping Use    Vaping Use: Never used   Substance Use Topics    Alcohol use: Not Currently     Comment: once a month, if at all.    Drug use: Never     Review of Systems   Constitutional:  Positive for malaise/fatigue and weight loss.   Respiratory:  Negative for shortness of breath.     Cardiovascular:  Negative for chest pain.   Gastrointestinal:  Positive for abdominal pain, nausea and vomiting.     Objective:     Vitals:    01/09/24 0925   BP: 120/57   Pulse: 78   Weight: 102 kg (225 lb)     Body mass index is 35.24 kg/m².    Physical Exam  Constitutional:       General: She is not in acute distress.     Appearance: She is obese. She is ill-appearing.      Comments: Limited exam due to virtual appointment   Pulmonary:      Effort: Pulmonary effort is normal.   Neurological:      Mental Status: She is alert.       Assessment and Plan:   Problem List Items Addressed This Visit       Bilateral chronic knee pain (Chronic)    Relevant Orders    Referral to Physical Therapy    Mild depression (Chronic)    Morbid obesity (HCC) (Chronic)    Primary biliary cholangitis (HCC) (Chronic)    Relevant Orders    Comp Metabolic Panel    Anemia    Relevant Orders    CBC WITH DIFFERENTIAL    Bilateral hip pain    Relevant Orders    Referral to Physical Therapy    Hospital discharge follow-up    Relevant Orders    Referral to Physical Therapy    Hypertension    Relevant Medications    amLODIPine (NORVASC) 5 MG Tab    Unable to walk     Other Visit Diagnoses       Essential hypertension        Relevant Medications    amLODIPine (NORVASC) 5 MG Tab            - Chart and discharge summary were reviewed.   - Hospitalization and results reviewed with patient.   - Medications reviewed including instructions regarding high risk medications, dosing and side effects.  - Recommended Services: No services needed at this time  - Advance directive/POLST on file?  Yes    Follow-up:No follow-ups on file.    Face-to-face transitional care management services with HIGH (today's visit is within days post discharge & LACE+ score 59+) medical decision complexity were provided.     LACE+ Historical Score Over Time (0-28: Low, 29-58: Medium, 59+: High): 77

## 2024-01-15 NOTE — TELEPHONE ENCOUNTER
Carmelina from [facility]  [phone] ex [phone]   Dr miner [phone] about patient general patient care concerning recent diagnosis

## 2024-01-17 NOTE — PROGRESS NOTES
Subjective:     CC: No chief complaint on file.    This visit was conducted via Zoom using secure and encrypted videoconferencing technology.   The patient was in their home in the [facility].    The patient's identity was confirmed and verbal consent was obtained for this virtual visit.     Diagnoses of Unable to walk, Peritoneal carcinomatosis (HCC), Appetite impaired, Constipation, unspecified constipation type, Intractable nausea and vomiting, and Mild depression were pertinent to this visit.    HPI: Olive is a pleasant 83 y.o. female who presents today to discuss the following problems:    Problem   Intractable Nausea and Vomiting    She is having troubles keeping the food.  She only tolerates liquids.  She is using scopolamine patch every 72 hours and Benadryl.    I have placed referral to dietitian, discussed food options to upgrade her diet, Pedialyte to supplement electrolytes.    She ideally should be on about 1700 david a day.      Peritoneal Carcinomatosis (Hcc)    Since this was performed, pathology results were negative.    I have discussed the case with Dr. Davies, who has also discussed her case with Dr. Dalal.  Per discussion, unfortunately, she is not a candidate for any surgical intervention at this point and her oncologist and gastroenterologist recommend hospice care.    Discussed with patient, she will prefer palliative care at this point.          Mild Depression    Chronic, stable on citalopram 40 mg tablet daily.          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 30 Suppository 0    amLODIPine (NORVASC) 5 MG Tab Take 1 Tablet by mouth every day. 90 Tablet 3    scopolamine (TRANSDERM-SCOP) 1 mg/72hr PATCH 72 HR Place 1 Patch on the skin every 72 hours. 10 Patch 1    diphenhydrAMINE (BENADRYL) 25 MG capsule Take 1 Capsule by mouth every 6 hours as needed (nausea). 30 Capsule 1    acetaminophen (TYLENOL) 500 MG Tab Take 500 mg by mouth 1 time a day as needed for Mild Pain.      Azelastine (ASTELIN) 137 MCG/SPRAY Solution Administer 1 Spray into each nostril every day.      ferrous sulfate 325 (65 Fe) MG tablet Take 325 mg by mouth 2 times a day with meals.      omeprazole (PRILOSEC OTC) 20 MG tablet Take 20 mg by mouth every day.      potassium chloride SA (K-DUR) 10 MEQ Tab CR Take 10 mEq by mouth every evening.      Vitamin D-Vitamin K (VITAMIN K2-VITAMIN D3 PO) Take 1 Tablet by mouth every afternoon on Monday through Friday. 1 tablet = 5,000 units vitamin D3      carboxymethylcellulose (REFRESH TEARS) 0.5 % Solution Administer 3-4 Drops into both eyes every morning.      Polyethylene Glycol 400 (BLINK TEARS) 0.25 % Solution Administer 1 Drop into both eyes every evening.      citalopram (CELEXA) 40 MG Tab Take 1 Tablet by mouth every day. 90 Tablet 3    losartan (COZAAR) 100 MG Tab Take 1 Tablet by mouth every day. 90 Tablet 3    HYDROcodone-acetaminophen (NORCO) 7.5-325 MG tab Take 1 Tablet by mouth every 8 hours as needed for Moderate Pain or Severe Pain for up to 30 days. 90 Tablet 0    [START ON 2/8/2024] HYDROcodone-acetaminophen (NORCO) 7.5-325 MG tab Take 1 Tablet by mouth  every 8 hours as needed for Moderate Pain or Severe Pain for up to 30 days. 90 Tablet 0    pravastatin (PRAVACHOL) 20 MG Tab Take 1 Tablet by mouth at bedtime. 90 Tablet 3    furosemide (LASIX) 40 MG Tab Take 1 Tablet by mouth 2 times a day as needed (LE edema). 180 Tablet 3    Multiple Vitamins-Minerals (PRESERVISION AREDS 2) Cap Take 1 Capsule by mouth 2 times a day.      ursodiol (ACTIGALL) 300 MG Cap Take 300 mg by mouth 2 times a day with meals.      ascorbic acid (VITAMIN C) 1000 MG tablet Take 1,000 mg by mouth every day.       No current [facility]-ordered facility-administered medications on file.     Review of Systems   Constitutional:  Positive for malaise/fatigue.   Gastrointestinal:  Positive for nausea and vomiting.   Musculoskeletal:  Negative for falls.   Psychiatric/Behavioral:  Negative for depression.      Objective:     Exam:  /85   Pulse 80  There is no height or weight on file to calculate BMI.    Physical Exam  Constitutional:       General: She is not in acute distress.     Appearance: She is ill-appearing. She is not toxic-appearing.      Comments: Limited exam due to virtual appointment   Pulmonary:      Effort: Pulmonary effort is normal.   Neurological:      Mental Status: She is alert.   Psychiatric:         Mood and Affect: Mood normal.       Assessment & Plan:   Olive  is a pleasant 83 y.o. female with the following -     Problem List Items Addressed This Visit       Mild depression (Chronic)     Stable, continue citalopram 40 mg daily.          Intractable nausea and vomiting    Relevant Medications    bisacodyl (DULCOLAX) 10 MG Suppos    Peritoneal carcinomatosis (HCC)    Relevant Orders    Referral to Palliative Care    Referral to Home Health    Unable to walk    Relevant Orders    Referral to Palliative Care    Referral to Home Health     Other Visit Diagnoses       Appetite impaired        Relevant Orders    Referral to Home Health    Constipation, unspecified constipation type         Relevant Medications    bisacodyl (DULCOLAX) 10 MG Suppos          Return if symptoms worsen or fail to improve.    Please note that this dictation was created using voice recognition software. I have made every reasonable attempt to correct obvious errors, but I expect that there are errors of grammar and possibly content that I did not discover before finalizing the note.

## 2024-01-20 NOTE — TELEPHONE ENCOUNTER
Spoke to daughter Jen at length and it has been determined that Olive will benefit from a hospice consult.  Palliative provider will not be scheduled until next week, and Olive is in a more urgent need. Will keep in touch with Jen, and make appointment for next week if she doesn't admit with hospice.  After chart review, discussion with Jen and discussion with Dr. Luke, hospice ordered.

## 2024-01-20 NOTE — PROGRESS NOTES
Patient discussed with Dallas Cunningham RN. Patient admitting to community hospice with primary diagnosis of peritoneal carcinomatosis. Comfort medications ordered for delivery

## 2024-02-14 PROCEDURE — S9126 HOSPICE CARE, IN THE HOME, P: HCPCS

## 2024-02-15 PROCEDURE — S9126 HOSPICE CARE, IN THE HOME, P: HCPCS

## 2024-10-06 NOTE — DISCHARGE PLANNING
Received Choice form at 1115  Agency/Facility Name: [facility]  Referral sent per Choice form @ 1125     1210:  Agency/Facility Name: [facility]  Spoke To: Humphrey  Outcome: DPA was notified DME has received referral and needs to review it. [facility] to call back in around 20 minutes to update this DPA on delivery time.     RN NESHA notified.     1250:  Agency/Facility Name: [facility]  Spoke To: Humphrey  Outcome: DPA was notified Pt oxygen will be delivered within the next hour to hour and a half.          Xray Image(s) - see wet read section for interpretation
